# Patient Record
Sex: FEMALE | Race: WHITE | NOT HISPANIC OR LATINO | URBAN - METROPOLITAN AREA
[De-identification: names, ages, dates, MRNs, and addresses within clinical notes are randomized per-mention and may not be internally consistent; named-entity substitution may affect disease eponyms.]

---
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
## 2024-01-29 ENCOUNTER — APPOINTMENT (OUTPATIENT)
Dept: NEUROLOGY | Facility: HOSPITAL | Age: 19
End: 2024-01-29
Payer: COMMERCIAL

## 2024-01-30 ENCOUNTER — DOCUMENTATION (OUTPATIENT)
Dept: HEMATOLOGY/ONCOLOGY | Facility: HOSPITAL | Age: 19
End: 2024-01-30
Payer: COMMERCIAL

## 2024-01-30 NOTE — PROGRESS NOTES
Email of no show  to  navigator. Patient was a no show 1/30/2024 with Aquilino MCGEE.Katerina Guerrero RN

## 2024-01-31 ENCOUNTER — OFFICE VISIT (OUTPATIENT)
Dept: NEUROLOGY | Facility: HOSPITAL | Age: 19
End: 2024-01-31
Payer: COMMERCIAL

## 2024-01-31 VITALS
TEMPERATURE: 97.5 F | SYSTOLIC BLOOD PRESSURE: 120 MMHG | HEART RATE: 101 BPM | RESPIRATION RATE: 18 BRPM | HEIGHT: 66 IN | DIASTOLIC BLOOD PRESSURE: 74 MMHG | WEIGHT: 229 LBS | BODY MASS INDEX: 36.8 KG/M2

## 2024-01-31 DIAGNOSIS — G89.4 CHRONIC PAIN SYNDROME: ICD-10-CM

## 2024-01-31 DIAGNOSIS — G40.109 FOCAL EPILEPSY (MULTI): ICD-10-CM

## 2024-01-31 DIAGNOSIS — R56.9 SEIZURE-LIKE ACTIVITY (MULTI): ICD-10-CM

## 2024-01-31 DIAGNOSIS — G47.19 EXCESSIVE DAYTIME SLEEPINESS: Primary | ICD-10-CM

## 2024-01-31 DIAGNOSIS — R56.9 SEIZURE (MULTI): ICD-10-CM

## 2024-01-31 PROCEDURE — 99214 OFFICE O/P EST MOD 30 MIN: CPT | Performed by: STUDENT IN AN ORGANIZED HEALTH CARE EDUCATION/TRAINING PROGRAM

## 2024-01-31 PROCEDURE — 1036F TOBACCO NON-USER: CPT | Performed by: STUDENT IN AN ORGANIZED HEALTH CARE EDUCATION/TRAINING PROGRAM

## 2024-01-31 RX ORDER — GABAPENTIN 300 MG/1
300 CAPSULE ORAL NIGHTLY
Qty: 30 CAPSULE | Refills: 4 | Status: SHIPPED | OUTPATIENT
Start: 2024-01-31

## 2024-01-31 RX ORDER — LAMOTRIGINE 100 MG/1
100 TABLET ORAL DAILY
Qty: 30 TABLET | Refills: 11 | Status: SHIPPED | OUTPATIENT
Start: 2024-01-31 | End: 2025-01-30

## 2024-01-31 ASSESSMENT — PAIN SCALES - GENERAL: PAINLEVEL: 6

## 2024-01-31 NOTE — PATIENT INSTRUCTIONS
Spenser,    Thank you for letting me take part in your care! You were seen in the neurology resident clinic today for sleepiness and seizures. Ultimately, we planned to get sleep studies and a routine EEG. Please call [phone] to schedule your EEG. Please ask the sleep clinic how long before the sleep tests you need to stop any REM-suppressing medications like bupropion.    Return to clinic in 3-4 months.    To make an appointment in my clinic or leave a message for me with the neurology office, please call [phone]. If you need to contact me directly, please call the hospital  at [phone], and ask them to page me at [phone].    Thank you,  Maurice Aleman MD PGY-3  [facility] [place]

## 2024-01-31 NOTE — PROGRESS NOTES
"NEUROLOGY OUTPATIENT CONSULTATION NOTE  Subjective   Consult Concern: paroxysmal events  History of Presenting Illness  Lisa Ramirez \"Spenser\" is a 18 y.o. right handed gender-queer patient (preferred pronouns \"he/they;\" assigned female sex at birth) with a history notable for asthma, fibromyalgia, depression, and seizures who presents to the neurology clinic for evaluation of daytime sleepiness and to establish care for seizure management. Referred by their internist Dr. Omayra Buitrago.    Sleepiness has been longstanding, which has been called \"depression\"  for a while. In sophomore year of high school, this began evolving to falling asleep in class. Has been progressively worsening since that time. Will suddenly fall asleep at least once daily. Sleep attacks happen in class, amongst friends, or anything. 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    Patient normally falls asleep anytime between midnight and 0300. Will always awaken at 0700. This is the same sleep schedule as the patient had since high school. No consistent napping. Does not have any difficulty falling asleep; occurs very quickly. Up so late because of school work, hobbies, and clubs. There is no day during which the patient does not feel sleepy. Sleep attacks have never caused serious injury. Attacks feel like dreaming starts immediately on falling asleep. Denies sleep paralysis or drop attacks. No onsets of weakness, emotionally driven or otherwise. Father also falls asleep very rapidly.      Often uses a tablet or phone right before bed, often in bed. Has a light on at night. To stay awake, patient typically has 2 cups of coffee daily. No energy drinks or caffeine pills. No other stimulants.    Unsure if any snoring. No mid-sleep arousals. No difficulty with breathing. Has never had a sleep study.    Patient has a chart diagnosis of “pseudoseizure,” but it " is unclear if this was ever demonstrated or disproven by neurophysiology and/or semiologics. Patient got majority of care in [place] prior to coming to [facility] for university studies.    Of note, patient was seen by neurology during admission to [facility] 9/28 - 10/7/23 as a consult for dyspnea to evaluate for neuromuscular origin of respiratory failure. No mention of seizures. Exams by Chad Kennedy and Alessandro Hollingsworth unremarkable. Case was signed-off by Dr. Mcbride and me the following day.    Patient states the paroxysmal events are well-controlled. Tends to start with an unpleasant smell and then progresses to BUE stiffness then to shaking.  Has had multiple EEGs and stays in EMUs, unsure what any of them have shown. Patient does not have any sort of diagnosis that they can recall. Used to get care from a neurologist with [facility]; does not have records. Does have a video of one of the events which they will send via [employer].      ROS:  A comprehensive review-of-systems was obtained and negative unless noted above in the HPI.    Past Medical History  Past Medical History:   Diagnosis Date    Asthma     Exercise induced bronchospasm     Asthma, exercise induced    Personal history of diseases of the blood and blood-forming organs and certain disorders involving the immune mechanism     History of iron deficiency anemia    Personal history of other mental and behavioral disorders     History of depression    Personal history of other mental and behavioral disorders     History of OCD (obsessive compulsive disorder)    Personal history of other specified conditions     History of pseudoseizure     Surgical History  No past surgical history on file.  Social History  Social History     Vaping Use    Vaping Use: Never used   Substance Use Topics    Alcohol use: Never    Drug use: Never     Allergies  Patient has no known allergies.      =========  Medications    Current Outpatient Medications:     acetaminophen (Tylenol) 325 mg  "tablet, Take 2 tablets (650 mg) by mouth every 4 hours as needed for fever, Disp: 30 tablet, Rfl: 0    albuterol 2.5 mg/0.5 mL nebulizer solution, USE ONE DOSE EVERY 4 HOURS AS NEEDED FOR SHORTNESS OF BREATH, Disp: 180 mL, Rfl: 1    albuterol 90 mcg/actuation inhaler, Inhale 2 puffs every 4 hours if needed., Disp: 8.5 g, Rfl: 3    buPROPion XL (Wellbutrin XL) 300 mg 24 hr tablet, TAKE 1 TABLET (300 MG) BY MOUTH ONCE DAILY IN THE MORNING., Disp: 90 tablet, Rfl: 3    cyclobenzaprine (Flexeril) 5 mg tablet, Take 1 tablet (5 mg) by mouth 3 times a day., Disp: 90 tablet, Rfl: 3    fluticasone propion-salmeteroL (Advair Diskus) 250-50 mcg/dose diskus inhaler, Inhale 1 puff 2 times a day. Rinse mouth with water after use to reduce aftertaste and incidence of candidiasis. Do not swallow., Disp: 60 each, Rfl: 11    gabapentin (Neurontin) 300 mg capsule, Take 1 capsule (300 mg) by mouth 2 times a day., Disp: 60 capsule, Rfl: 0    lamoTRIgine (LaMICtal) 100 mg tablet, Take 1 tablet (100 mg) by mouth once daily., Disp: 30 tablet, Rfl: 11    lamoTRIgine (LaMICtal) 100 mg tablet, Take 1 tablet (100 mg) by mouth once daily., Disp: 30 tablet, Rfl: 0    meloxicam (Mobic) 15 mg tablet, Take 1 tablet by mouth once daily., Disp: 30 tablet, Rfl: 0    naproxen (Naprosyn) 500 mg tablet, Take 1 tablet (500 mg) by mouth 2 times a day as needed for 5 days., Disp: 10 tablet, Rfl: 0    norethindrone (Winter) 0.35 mg tablet, Take 1 tablet (0.35 mg) by mouth once daily., Disp: 28 tablet, Rfl: 12    pantoprazole (ProtoNix) 40 mg EC tablet, Take 1 tablet (40 mg) by mouth once daily in the morning., Disp: 30 tablet, Rfl: 3     =========    Objective   Vital Signs  /74   Pulse 101   Temp 36.4 °C (97.5 °F)   Resp 18   Ht 1.676 m (5' 6\")   Wt 104 kg (229 lb)   BMI 36.96 kg/m²     Physical Exam  GENERAL: sitting up in chair comfortably; well-appearing and in NAD. Appears stated age.  PSYCHIATRIC/MSE: conversational; full euthymic affect; " logical thought process; cognition intact.  NEUROLOGICAL:     Cognitive Status: A&Ox4. Language expression, naming, and repetition intact. Remains focused during interview.     Cranial Nerves: VF full; PERRL (4 --> 2) b/l; EOMI; face sensation & strength symmetric; tongue midline; shoulders strong.     Fundoscopic: no papilledema. Normal disc margins. CDR <0.5.     Motor: Normal bulk and tone without tremor or pronator drift. No spasticity.     Strength: All extremities are 5/5.     Reflexes: 2+ to all modalities. Torres's absent. Flexor plantar bilaterally. No ankle clonus.     Sensory: intact and symmetric to light touch     Coordination: normokinetic w/o ataxia or action tremor on FtN and HtS.     Rapid Movements: rapid finger & foot tapping w/o dysdiadochokinesia     Gait: stable with normal step and stride length. Gait on tandem, toe, and heel are steady.      Recent/Relevant Labs:  Hemoglobin   Date/Time Value Ref Range Status   10/06/2023 06:00 AM 12.5 12.0 - 16.0 g/dL Final     WBC   Date/Time Value Ref Range Status   10/06/2023 06:00 AM 11.1 4.4 - 11.3 x10*3/uL Final     Sodium   Date/Time Value Ref Range Status   10/06/2023 06:00  136 - 145 mmol/L Final     Potassium   Date/Time Value Ref Range Status   10/06/2023 06:00 AM 3.8 3.5 - 5.3 mmol/L Final     Chloride   Date/Time Value Ref Range Status   10/06/2023 06:00  98 - 107 mmol/L Final     Urea Nitrogen   Date/Time Value Ref Range Status   10/06/2023 06:00 AM 18 6 - 23 mg/dL Final     Creatinine   Date/Time Value Ref Range Status   10/06/2023 06:00 AM 0.84 0.50 - 1.05 mg/dL Final     Phosphorus   Date/Time Value Ref Range Status   10/06/2023 06:00 AM 4.1 2.5 - 4.9 mg/dL Final     Comment:     The performance characteristics of phosphorus testing in heparinized plasma have been validated by the individual  laboratory site where testing is performed. Testing on heparinized plasma is not approved by the FDA; however, such approval is not  necessary.     Calcium   Date/Time Value Ref Range Status   10/06/2023 06:00 AM 9.1 8.6 - 10.6 mg/dL Final     Total Protein   Date/Time Value Ref Range Status   10/02/2023 06:17 AM 6.6 6.4 - 8.2 g/dL Final     Albumin   Date/Time Value Ref Range Status   10/06/2023 06:00 AM 3.9 3.4 - 5.0 g/dL Final     Bilirubin, Total   Date/Time Value Ref Range Status   10/02/2023 06:17 AM 0.3 0.0 - 1.2 mg/dL Final     AST   Date/Time Value Ref Range Status   10/02/2023 06:17 AM 10 9 - 39 U/L Final     ALT   Date/Time Value Ref Range Status   10/02/2023 06:17 AM 13 7 - 45 U/L Final     Comment:     Patients treated with Sulfasalazine may generate falsely decreased results for ALT.     Alkaline Phosphatase   Date/Time Value Ref Range Status   10/02/2023 06:17 AM 55 33 - 110 U/L Final     Total CK   Date/Time Value Ref Range Status   02/06/2023 05:23 PM 79 0 - 215 U/L Final      LDL   Date/Time Value Ref Range Status   09/01/2022 11:14 AM 49 0 - 109 mg/dL Final     Comment:     .                           NEAR      BORD      AGE      DESIRABLE  OPTIMAL    HIGH     HIGH     VERY HIGH     0-19 Y     0 - 109     ---    110-129   >/= 130     ----    20-24 Y     0 - 119     ---    120-159   >/= 160     ----      >24 Y     0 -  99   100-129  130-159   160-189     >/=190  .       Triglycerides   Date/Time Value Ref Range Status   09/01/2022 11:14 AM 67 0 - 149 mg/dL Final     Comment:     .      AGE      DESIRABLE   BORDERLINE HIGH   HIGH     VERY HIGH   0 D-90 D    19 - 174         ----         ----        ----  91 D- 9 Y     0 -  74        75 -  99     >/= 100      ----    10-19 Y     0 -  89        90 - 129     >/= 130      ----    20-24 Y     0 - 114       115 - 149     >/= 150      ----         >24 Y     0 - 149       150 - 199    200- 499    >/= 500  .   Venipuncture immediately after or during the    administration of Metamizole may lead to falsely   low results. Testing should be performed immediately   prior to Metamizole  dosing.       Hemoglobin A1C   Date/Time Value Ref Range Status   02/06/2023 05:23 PM 5.7 (A) % Final     Comment:          Diagnosis of Diabetes-Adults   Non-Diabetic: < or = 5.6%   Increased risk for developing diabetes: 5.7-6.4%   Diagnostic of diabetes: > or = 6.5%  .       Monitoring of Diabetes                Age (y)     Therapeutic Goal (%)   Adults:          >18           <7.0   Pediatrics:    13-18           <7.5                   7-12           <8.0                   0- 6            7.5-8.5   American Diabetes Association. Diabetes Care 33(S1), Jan 2010.     09/01/2022 11:14 AM 5.8 (A) % Final     Comment:          Diagnosis of Diabetes-Adults   Non-Diabetic: < or = 5.6%   Increased risk for developing diabetes: 5.7-6.4%   Diagnostic of diabetes: > or = 6.5%  .       Monitoring of Diabetes                Age (y)     Therapeutic Goal (%)   Adults:          >18           <7.0   Pediatrics:    13-18           <7.5                   7-12           <8.0                   0- 6            7.5-8.5   American Diabetes Association. Diabetes Care 33(S1), Jan 2010.       Estimated Average Glucose   Date/Time Value Ref Range Status   02/06/2023 05:23  MG/DL Final     Troponin I   Date/Time Value Ref Range Status   09/28/2023 01:04 PM <3 0 - 34 ng/L Final     Comment:     .  Less than 99th percentile of normal range cutoff-  Female and children under 18 years old <35 ng/L; Male <54 ng/L: Negative  Repeat testing should be performed if clinically indicated.   .  Female and children under 18 years old  ng/L; Male  ng/L:  Consistent with possible cardiac damage and possible increased clinical   risk. Serial measurements may help to assess extent of myocardial damage.   .  >120 ng/L: Consistent with cardiac damage, increased clinical risk and  myocardial infarction. Serial measurements may help assess extent of   myocardial damage.   .   NOTE: Children less than 1 year old may have higher baseline troponin    levels and results should be interpreted in conjunction with the overall   clinical context.  .  NOTE: Troponin I testing is performed using a different   testing methodology at [facility] than at other   [facility]. Direct result comparisons should only   be made within the same method.       TSH   Date/Time Value Ref Range Status   02/06/2023 05:23 PM 4.48 (H) 0.44 - 3.98 mIU/L Final     Comment:      TSH testing is performed using different testing    methodology at [facility] than at other    [facility]. Direct result comparisons should    only be made within the same method.     09/01/2022 11:14 AM 1.85 0.44 - 3.98 mIU/L Final     Comment:      TSH testing is performed using different testing    methodology at [facility] than at other    [facility]. Direct result comparisons should    only be made within the same method.       Free T4   Date/Time Value Ref Range Status   02/06/2023 05:23 PM 1.34 0.78 - 1.48 ng/dL Final     Comment:      Thyroxine Free testing is performed using different testing    methodology at [facility] than at other    [facility]. Direct result comparisons should    only be made within the same method.         Recent/Relevant Imaging:  No MRI head results found for the past 12 months   No CT head results found for the past 12 months     Other Recent/Relevant Studies:  No EMG results found for the past 12 months   No EEG results found for the past 12 months   Transthoracic Echo (TTE) Complete    Result Date: 10/2/2023   [facility], [address] [place] [address]                Tel [phone] and Fax [phone] TRANSTHORACIC ECHOCARDIOGRAM REPORT  Patient Name:      POLLO Aldana Physician:    [id number] Chris Freeman MD Study Date:        10/2/2023           Ordering Provider:    [id number] KAITLYNN WISEMAN MRN/PID:            [id number]            Fellow: Accession#:        [id number]        Nurse: Date of Birth/Age: 2005 / 18 years Sonographer:          BERNARD Romo RDCS Gender:            F                   Additional Staff: Height:            167.64              Admit Date:           9/28/2023 Weight:            101.61              Admission Status:     Inpatient - Routine BSA:               2.10 m2             Encounter#:           [id number]                                        Department Location:  [facility] Non                                                              Invasive Blood Pressure: 131 /85 mmHg Study Type:    TRANSTHORACIC ECHO (TTE) COMPLETE Diagnosis/ICD: Chest pain, unspecified-R07.9 Indication:    Chest Pain Patient History: Pertinent History: Chest Pain and Dyspnea. Study Detail: The following Echo studies were performed: 2D, M-Mode, Doppler and               color flow.  PHYSICIAN INTERPRETATION: Left Ventricle: The left ventricular systolic function is normal. There are no regional wall motion abnormalities. The left ventricular cavity size is normal. Spectral Doppler shows an impaired relaxation pattern of left ventricular diastolic filling. Left Atrium: The left atrium is normal in size. Right Ventricle: The right ventricle is normal in size. There is normal right ventricular global systolic function. Right Atrium: The right atrium is normal in size. Aortic Valve: The aortic valve is probably trileaflet. There are increased aortic valve velocities due to increased flow/dynamic ejection. There is no evidence of aortic valve regurgitation. The peak instantaneous gradient of the aortic valve is 9.9 mmHg. Mitral Valve: The mitral valve is normal in structure. There is no evidence of mitral valve regurgitation. Tricuspid Valve: The tricuspid valve is structurally normal. There is trace tricuspid regurgitation. The right  ventricular systolic pressure is unable to be estimated. Pulmonic Valve: The pulmonic valve is structurally normal. There is trace pulmonic valve regurgitation. Pericardium: There is a trivial to small pericardial effusion. Aorta: The aortic root is normal. Systemic Veins: The inferior vena cava appears to be of normal size. In comparison to the previous echocardiogram(s): There are no prior studies on this patient for comparison purposes.  CONCLUSIONS:  1. Left ventricular systolic function is normal.  2. Spectral Doppler shows an impaired relaxation pattern of left ventricular diastolic filling. QUANTITATIVE DATA SUMMARY: 2D MEASUREMENTS:                          Normal Ranges: Ao Root d:     2.70 cm   (2.0-3.7cm) LAs:           3.60 cm   (2.7-4.0cm) IVSd:          0.90 cm   (0.6-1.1cm) LVPWd:         1.10 cm   (0.6-1.1cm) LVIDd:         4.40 cm   (3.9-5.9cm) LVIDs:         3.00 cm LV Mass Index: 70.4 g/m2 LV % FS        31.8 % LA VOLUME:                               Normal Ranges: LA Vol A4C:        47.9 ml    (22+/-6mL/m2) LA Vol A2C:        38.2 ml LA Vol BP:         43.7 ml LA Vol Index A4C:  22.8ml/m2 LA Vol Index A2C:  18.2 ml/m2 LA Vol Index BP:   20.8 ml/m2 LA Area A4C:       16.2 cm2 LA Area A2C:       14.8 cm2 LA Major Axis A4C: 4.7 cm LA Major Axis A2C: 4.9 cm LA Volume Index:   20.8 ml/m2 RA VOLUME BY A/L METHOD:                      Normal Ranges: RA Area A4C: 9.6 cm2 AORTA MEASUREMENTS:                    Normal Ranges: Asc Ao, d: 2.80 cm (2.1-3.4cm) LV SYSTOLIC FUNCTION BY 2D PLANIMETRY (MOD):                     Normal Ranges: EF-A4C View: 65.5 % (>=55%) EF-A2C View: 62.8 % EF-Biplane:  63.0 % LV DIASTOLIC FUNCTION:                        Normal Ranges: MV Peak E:    0.86 m/s (0.7-1.2 m/s) MV Peak A:    1.02 m/s (0.42-0.7 m/s) E/A Ratio:    0.84     (1.0-2.2) MV e'         0.08 m/s (>8.0) MV lateral e' 0.09 m/s MV medial e'  0.06 m/s E/e' Ratio:   11.48    (<8.0) AORTIC VALVE:                       "   Normal Ranges: AoV Vmax:      1.57 m/s (<=1.7m/s) AoV Peak P.9 mmHg (<20mmHg) LVOT Max Tim:  1.36 m/s (<=1.1m/s) LVOT VTI:      21.50 cm LVOT Diameter: 2.00 cm  (1.8-2.4cm) AoV Area,Vmax: 2.72 cm2 (2.5-4.5cm2)  RIGHT VENTRICLE: RV Basal 3.34 cm RV Mid   2.34 cm RV Major 6.7 cm TAPSE:   19.1 mm RV s'    0.14 m/s TRICUSPID VALVE/RVSP:                   Normal Ranges: IVC Diam: 1.52 cm PULMONIC VALVE:                      Normal Ranges: PV Max Tim: 1.0 m/s  (0.6-0.9m/s) PV Max PG:  3.7 mmHg  [id number] Chris Freeman MD Electronically signed on 10/2/2023 at 1:20:56 PM  ** Final **    No results found for this or any previous visit (from the past 4464 hour(s)).         =========  Assessment/Plan   Assessment:  Lisa Ramirez \"Spenser\" is a 18 y.o. right handed gender-queer patient (preferred pronouns \"he/they;\" assigned female sex at birth) with a history notable for asthma, fibromyalgia, depression, and seizures who presents to the neurology clinic for evaluation of daytime sleepiness and to establish care for seizure management. 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 Regarding sleepiness, this has been longstanding since starting college-level classes in high school and gradually progressed. Now manifests as persistent daytime sleepiness with at least daily sleep attacks. Often has dreams initially with the LOC, even if the patient is awoken in several minutes by friends. Has not caused any injury. No cataplexy, sleep paralysis, snoring, or nighttime arousals. Fully endorses poor sleep hygiene with around 4 hours nightly, electronic device use, and light on during sleep. Overall manifestation is suspicious for Insufficient Sleep Syndrome with REM Intrusions; DDx does of course include narcolepsy. Regarding patient history of seizures, the limited anamnesis is suspicious for mesial temporal origin. Although patient is on very minimal AED doses typically considered subtherapeutic for seizures, because these have been very " well-controlled, we will continue those for now. Can get baseline EEG in the meantime.    4D Classification of Paroxysmal Spells  Type: PE  Semiology: olfactory aura event->Dyscognitive->Generalized motor event  EZ: N/A  Etiology: unknown  Comorbidities: depression, fibromyalgia  Add'l Features:     - Lateralizing Signs: NA     - Diagnostic Signs: NA     - Event Triggers: NA  Onset/Frequency: 13yo / 1-2 yearly  AEDs: Current - LTG (mood-dose), (GBP)  Prior- UNK    History of Generalized Convulsive Status Epilepticus?: No  History of Cranial Trauma/Neurosurgery?: No      Impression:   #Insufficient Sleep Syndrome w/ REM Intrusions vs Narcolepsy  #Paroxysmal Events with Concern for Mesial Temporal Epilepsy  #Chronic Pain    Plan:  - employ sleep hygiene modifications as advised in clinic.  - obtain in-house diagnostic PSG and MSLT. Will need to pause bupropion prior.  - obtain baseline routine EEG  - continue LTG 100mg daily and GBP 300mg daily. Neurology will assume these prescribing duties at this time. In the state of [place], there is no need for any urine drug testing to give these medications.  - patient to send video of her paroxysmal events via [employer], as she had one while in the testing center of [facility] that was captured on video.  - will need to get thorough seizure history at the patient's next visit. Ideally would get anamnesis per collateral, but patient states not on great speaking terms with parents.        Maurice Aleman MD PGY-3  [facility] Neurological [place]    ATTENDING PHYSICIAN ADDENDUM:  I saw and evaluated the patient. I personally obtained the key and critical portions of the history and physical exam. I reviewed the resident's documentation and discussed the patient with the resident. I agree with the resident's medical decision making as documented in the note except/in addition:  I edited the resident's note as required.    Luis E Padilla MD  [facility]    The total appointment time today  was 45 minutes. Time included preparing to see the patient, obtaining the history, performing a medically necessary appropriate physical examination, counseling and educating the patient/family/caregiver, ordering medications, tests and procedures, referring and communicating with other providers, independently interpreting results and communicating the results to the patient/family/caregiver, care coordination] and documenting clinical information in the medical record.

## 2024-02-07 ENCOUNTER — HOSPITAL ENCOUNTER (EMERGENCY)
Facility: HOSPITAL | Age: 19
Discharge: HOME | End: 2024-02-08
Attending: EMERGENCY MEDICINE
Payer: COMMERCIAL

## 2024-02-07 VITALS
SYSTOLIC BLOOD PRESSURE: 128 MMHG | RESPIRATION RATE: 18 BRPM | OXYGEN SATURATION: 98 % | HEART RATE: 90 BPM | BODY MASS INDEX: 36.16 KG/M2 | HEIGHT: 66 IN | TEMPERATURE: 98.5 F | DIASTOLIC BLOOD PRESSURE: 81 MMHG | WEIGHT: 225 LBS

## 2024-02-07 DIAGNOSIS — G47.411 NARCOLEPSY AND CATAPLEXY (HHS-HCC): Primary | ICD-10-CM

## 2024-02-07 LAB
ALBUMIN SERPL BCP-MCNC: 4.4 G/DL (ref 3.4–5)
ALP SERPL-CCNC: 59 U/L (ref 33–120)
ALT SERPL W P-5'-P-CCNC: 10 U/L (ref 7–52)
ANION GAP SERPL CALC-SCNC: 14 MMOL/L (ref 10–20)
AST SERPL W P-5'-P-CCNC: 10 U/L (ref 9–39)
BASOPHILS # BLD AUTO: 0.04 X10*3/UL (ref 0–0.1)
BASOPHILS NFR BLD AUTO: 0.4 %
BILIRUB SERPL-MCNC: 0.5 MG/DL (ref 0–1.2)
BUN SERPL-MCNC: 8 MG/DL (ref 6–23)
CALCIUM SERPL-MCNC: 9.5 MG/DL (ref 8.6–10.6)
CHLORIDE SERPL-SCNC: 104 MMOL/L (ref 98–107)
CO2 SERPL-SCNC: 25 MMOL/L (ref 21–32)
CREAT SERPL-MCNC: 0.66 MG/DL (ref 0.5–1.3)
EGFRCR SERPLBLD CKD-EPI 2021: >90 ML/MIN/1.73M*2
EOSINOPHIL # BLD AUTO: 0.08 X10*3/UL (ref 0–0.7)
EOSINOPHIL NFR BLD AUTO: 0.8 %
ERYTHROCYTE [DISTWIDTH] IN BLOOD BY AUTOMATED COUNT: 13.1 % (ref 11.5–14.5)
GLUCOSE SERPL-MCNC: 88 MG/DL (ref 74–99)
HCT VFR BLD AUTO: 37.9 % (ref 36–52)
HGB BLD-MCNC: 12.7 G/DL (ref 12–17.5)
IMM GRANULOCYTES # BLD AUTO: 0.03 X10*3/UL (ref 0–0.7)
IMM GRANULOCYTES NFR BLD AUTO: 0.3 % (ref 0–0.9)
LYMPHOCYTES # BLD AUTO: 3.01 X10*3/UL (ref 1.2–4.8)
LYMPHOCYTES NFR BLD AUTO: 28.3 %
MCH RBC QN AUTO: 24.6 PG (ref 26–34)
MCHC RBC AUTO-ENTMCNC: 33.5 G/DL (ref 32–36)
MCV RBC AUTO: 73 FL (ref 80–100)
MONOCYTES # BLD AUTO: 0.74 X10*3/UL (ref 0.1–1)
MONOCYTES NFR BLD AUTO: 7 %
NEUTROPHILS # BLD AUTO: 6.72 X10*3/UL (ref 1.2–7.7)
NEUTROPHILS NFR BLD AUTO: 63.2 %
NRBC BLD-RTO: 0 /100 WBCS (ref 0–0)
PLATELET # BLD AUTO: 260 X10*3/UL (ref 150–450)
POTASSIUM SERPL-SCNC: 3.6 MMOL/L (ref 3.5–5.3)
PROT SERPL-MCNC: 6.7 G/DL (ref 6.4–8.2)
RBC # BLD AUTO: 5.17 X10*6/UL (ref 4–5.9)
SODIUM SERPL-SCNC: 139 MMOL/L (ref 136–145)
WBC # BLD AUTO: 10.6 X10*3/UL (ref 4.4–11.3)

## 2024-02-07 PROCEDURE — 85025 COMPLETE CBC W/AUTO DIFF WBC: CPT | Performed by: STUDENT IN AN ORGANIZED HEALTH CARE EDUCATION/TRAINING PROGRAM

## 2024-02-07 PROCEDURE — 99283 EMERGENCY DEPT VISIT LOW MDM: CPT | Performed by: EMERGENCY MEDICINE

## 2024-02-07 PROCEDURE — 80053 COMPREHEN METABOLIC PANEL: CPT | Performed by: STUDENT IN AN ORGANIZED HEALTH CARE EDUCATION/TRAINING PROGRAM

## 2024-02-07 PROCEDURE — 36415 COLL VENOUS BLD VENIPUNCTURE: CPT | Performed by: STUDENT IN AN ORGANIZED HEALTH CARE EDUCATION/TRAINING PROGRAM

## 2024-02-07 ASSESSMENT — COLUMBIA-SUICIDE SEVERITY RATING SCALE - C-SSRS
2. HAVE YOU ACTUALLY HAD ANY THOUGHTS OF KILLING YOURSELF?: NO
1. IN THE PAST MONTH, HAVE YOU WISHED YOU WERE DEAD OR WISHED YOU COULD GO TO SLEEP AND NOT WAKE UP?: NO
6. HAVE YOU EVER DONE ANYTHING, STARTED TO DO ANYTHING, OR PREPARED TO DO ANYTHING TO END YOUR LIFE?: NO

## 2024-02-07 ASSESSMENT — PAIN SCALES - GENERAL
PAINLEVEL_OUTOF10: 0 - NO PAIN
PAINLEVEL_OUTOF10: 0 - NO PAIN

## 2024-02-07 ASSESSMENT — LIFESTYLE VARIABLES
EVER FELT BAD OR GUILTY ABOUT YOUR DRINKING: NO
EVER HAD A DRINK FIRST THING IN THE MORNING TO STEADY YOUR NERVES TO GET RID OF A HANGOVER: NO
HAVE PEOPLE ANNOYED YOU BY CRITICIZING YOUR DRINKING: NO
HAVE YOU EVER FELT YOU SHOULD CUT DOWN ON YOUR DRINKING: NO

## 2024-02-07 ASSESSMENT — PAIN - FUNCTIONAL ASSESSMENT: PAIN_FUNCTIONAL_ASSESSMENT: 0-10

## 2024-02-07 NOTE — Clinical Note
"                        Lisa Ramirez was seen and treated in our emergency department on 2/7/2024.  Lisa Ramirez \"Spenser\" may return to school on 02/12/2024.    To whom it may concern, Spenser suffers from narcolepsy and was unable to take medications over the last week which would have severely affected their performance in school or ability to stay awake during classes or participate in exams.  Please take this into consideration.  Excused from school until Monday 2/12.    If you have any questions or concerns, please don't hesitate to call.      Wilber Holt MD"

## 2024-02-07 NOTE — ED TRIAGE NOTES
"Patient was seeing a neurologist for possible narcolepsy and they recently took her them off of a medication for this and they have been falling asleep \"everywhere\" since then; states they were walking to class recently and next thing they knew they were on the ground asleep; also endorses an increase in generalized fatigue   " 

## 2024-02-08 DIAGNOSIS — G47.9: Primary | ICD-10-CM

## 2024-02-08 RX ORDER — BUPROPION HYDROCHLORIDE 150 MG/1
TABLET ORAL
Qty: 7 TABLET | Refills: 0 | Status: SHIPPED | OUTPATIENT
Start: 2024-02-08 | End: 2024-04-30 | Stop reason: WASHOUT

## 2024-02-08 NOTE — PROGRESS NOTES
Contacted patient today regarding MSLT protocol for Wellbutrin XL. Two weeks prior to appointment, patient will decrease dose from 300mg QAM to 150mg QAM. Then, one week prior to appointment, patient will stop Wellbutrin. Will need to monitor closely for any depressive effects; if major depressive symptoms arise, we will stop the taper and return to 300mg QAM.    Sending a 7-day Rx of Wellbutrin XL 150mg to the patient's preferred pharmacy. Communicated these instructions to the patient.    Case discussed with Dr. Luis E Aleman MD PGY-3  [facility] [place]

## 2024-02-08 NOTE — DISCHARGE INSTRUCTIONS
Resume taking Wellbutrin.  Follow-up with your doctor as outpatient.  Please speak with her professors about what has been going on, so that they can provide specific accommodations.

## 2024-02-08 NOTE — ED PROVIDER NOTES
"CC: Narcolepsy     HPI:  18-year-old patient (preferred pronouns he/they, assigned female at birth) presents to the emergency department with concern for worsening symptoms of narcolepsy.  Patient states has been following with neurology as outpatient, also with a history of epilepsy.  Saw his outpatient neurologist last week, was advised to hold Wellbutrin and had a sleep study which has not been scheduled yet.  Since discontinuing his Wellbutrin, he has had significant worsening symptoms of daytime sleepiness, falling asleep during an exam multiple times yesterday.  Also with an episode of his legs giving out from under him, which he says he has had 1 time previously.  Did not hit his head and suffered no injuries from that fall.  He is unsure if he should resume his Wellbutrin presents to the ED for further evaluation.    Records Reviewed:  Recent available ED and inpatient notes reviewed in EMR.    PMHx/PSHx:  Per HPI.   - has a past medical history of Asthma, Exercise induced bronchospasm, Personal history of diseases of the blood and blood-forming organs and certain disorders involving the immune mechanism, Personal history of other mental and behavioral disorders, Personal history of other mental and behavioral disorders, and Personal history of other specified conditions.  - has no past surgical history on file.  - has Asthma, acute; Stridor; Anemia; Anxiety; and Depression on their problem list.    Medications:  Reviewed in EMR. See EMR for complete list of medications and doses.    Allergies:  Patient has no known allergies.    Social History:  - Tobacco:  reports that Lisa Ramirez \"Spenser\" has never smoked. Lisa Ramirez \"Spenser\" has never used smokeless tobacco.   - Alcohol:  reports no history of alcohol use.   - Illicit Drugs:  reports no history of drug use.     ROS:  Per HPI.       ???????????????????????????????????????????????????????????????  Triage Vitals:  T 36.9 °C (98.5 °F)  HR 90  BP " "128/81  RR 18  O2 98 %      Physical Exam  Vitals and nursing note reviewed.   Constitutional:       General: Lisa Ramirez \"Spenser\" is not in acute distress.  HENT:      Head: Atraumatic.   Eyes:      General: No scleral icterus.     Conjunctiva/sclera: Conjunctivae normal.   Cardiovascular:      Rate and Rhythm: Regular rhythm.      Heart sounds: No murmur heard.     No friction rub. No gallop.   Pulmonary:      Effort: No respiratory distress.      Breath sounds: Normal breath sounds. No wheezing or rales.   Chest:      Chest wall: No tenderness.   Abdominal:      Palpations: Abdomen is soft.      Tenderness: There is no abdominal tenderness.   Musculoskeletal:      Right lower leg: No edema.      Left lower leg: No edema.   Skin:     General: Skin is warm and dry.      Findings: No rash.   Neurological:      Mental Status: Lisa Ramirez \"Spenser\" is alert.      Cranial Nerves: No facial asymmetry.   Psychiatric:         Mood and Affect: Mood normal.         Behavior: Behavior normal.       ???????????????????????????????????????????????????????????????  Assessment and Plan:  18-year-old presents to the emergency department with concern for excess daytime sleepiness and worsening symptoms of narcolepsy.  This symptoms appear to correlate with his discontinuation of Wellbutrin.  I did speak with the neurology residents on call, they recommend resuming this medication at this time, as he has no set date for his sleep study, there is no use and holding the medication this far out had.  They will discuss with his outpatient doctor in the morning as well to arrange for follow-up.  Basic labs reviewed were unremarkable.  Discharged home in stable condition.    Social Determinants Limiting Care:  None identified    Disposition:  Discharge home    --  Wilber Holt MD  Emergency Medicine, PGY-3      Procedures ? [employer] last updated 2/7/2024 10:34 PM        Wilber Holt MD  Resident  02/07/24 [id number]    "

## 2024-02-28 ENCOUNTER — OFFICE VISIT (OUTPATIENT)
Dept: NEUROLOGY | Facility: HOSPITAL | Age: 19
End: 2024-02-28
Payer: COMMERCIAL

## 2024-02-28 DIAGNOSIS — G47.419 SLEEP ATTACK (HHS-HCC): Primary | ICD-10-CM

## 2024-02-28 PROCEDURE — 1036F TOBACCO NON-USER: CPT | Performed by: PSYCHIATRY & NEUROLOGY

## 2024-02-28 PROCEDURE — 99215 OFFICE O/P EST HI 40 MIN: CPT | Performed by: PSYCHIATRY & NEUROLOGY

## 2024-02-28 PROCEDURE — 99215 OFFICE O/P EST HI 40 MIN: CPT | Mod: GC | Performed by: PSYCHIATRY & NEUROLOGY

## 2024-02-28 NOTE — PROGRESS NOTES
"NEUROLOGY OUTPATIENT FOLLOW-UP NOTE  Subjective   Patient Overview  Lisa Ramirez \"Spenser\" is a 18 y.o. right handed gender-queer patient (preferred pronouns \"he/they;\" assigned female sex at birth) with a history notable for asthma, fibromyalgia, depression, and paroxysmal events who presents to the neurology clinic for followup of excessive daytime sleepiness and paroxysmal events. The patient has carried the diagnosis of Insufficient Sleep Syndrome and Paroxysmal Events.    Previous Neurologic History  Briefly, the patient was first seen in neurology clinic on 1/31/24. At that time, they endorsed both sleepiness (main reason for consult) and paroxysmal events. Regarding sleepiness, this was described as longstanding since starting college-level classes in high school and gradually progressed. Now manifesting as persistent daytime sleepiness with at least once-daily sleep attacks. Often has dreams initially with the LOC, even if they are awoken in several minutes by friends. No other stigmata of narcolepsy and endorsed poor sleep hygiene. Regarding paroxysmal events, patient endorsed episodes of foul smell followed by LOC and diffuse motor actions since childhood.    Overall diagnoses were consistent with Insufficient Sleep Syndrome with REM Intrusions (DDx narcolepsy) and unspecified paroxysmal events. Plan was to obtain PSG + MSLT + EEG, continue home  &  daily, and get better history of events at next appt. Patient also planned to send video of paroxysmal events.      Interval History  In the interim, the patient states that sleep attacks have persisted. Did have an event (see my patient message from 2/8/23) where they abruptly stopped methylphenidate thinking an MSLT would be soon and subsequently had worsening of the attacks. Clarified the protocol for weaning REM-suppressing medications to the patient.    At this time, the patient is frustrated with how slowly workup is going because it does " "negatively impact their daily function (especially as a premed student). Inquires about an LP, which is possible but given there are no sleep attendings in neurology clinic, orexin would not be a test we would be comfortable obtaining, sending, and interpreting.     Additionally obtained information on the paroxysmal events (NOT to be confused with the sleep intrusions, which are separate). Details below.    HISTORY OF PAROXYSMAL EVENTS:             Age and circumstances of first event: 13yo. At a friend's house.             History of generalized events? yes             History of status epilepticus? no             Frequency: 1-2x yearly. Usually during stressful life events.             Last episode: 2023               Last EEG?: 15 or 17 yo. At [facility]. Has had overnight cvEEG.             Last MRI?: Spring 2023    ANAMNESIS PER PATIENT  A day before, the patient will notice a little \"weird\" feeling. During the event, there will be a burning smell. Then loses consciousness and wakes up on the floor with all muscles hurting. Has had minor injuries. Usually has urinary incontinence and has once or twice awakened with blood in the mouth, but does not have stimata of tongue biting.    Events have never changed in character, frequency, or duration.      ANAMNESIS PER COLLATERAL  Unable to obtain.      RELATED COMORBIDITIES  Other Spell Types             Staring Spells? - yes. Big problem in middle and high school.             Arm/Leg Jerks? - maybe occasionally?              Lost blocks of time? - no             Events during sleep? - none    Epilepsy Risk Factors             Term birth - Yes             History of  hypoxia - no             History of birth complications - no             History of abnormal development or intellectual disability - no             History of CNS infection (meningitis/encephalitis) - no              History of febrile seizures - no             History of " moderate/severe head trauma - several mild concussions             History of stroke/ICH - no             Family history of seizures - no             History of drug / alcohol use - maybe occasional marijuana??             History of neurosurgical procedure - no    ADDITIONAL INFORMATION  Anti-Epileptic Drug (AED) History             Compliant: yes             Current AEDs: LMT 100mg Qday; GBP 300mg QHS             Current medication side effects: none             AEDs previously used and reasons for discontinuation: none    Quality of Life:             Driving: none             Working: Case Student- Premed             Living with: self - studio apartment.      ROS:  A comprehensive review-of-systems was obtained and negative unless noted above in the HPI.    Past Medical History  Past Medical History:   Diagnosis Date    Asthma     Exercise induced bronchospasm     Asthma, exercise induced    Personal history of diseases of the blood and blood-forming organs and certain disorders involving the immune mechanism     History of iron deficiency anemia    Personal history of other mental and behavioral disorders     History of depression    Personal history of other mental and behavioral disorders     History of OCD (obsessive compulsive disorder)    Personal history of other specified conditions     History of pseudoseizure     Surgical History  No past surgical history on file.  Social History  Social History     Tobacco Use    Smoking status: Never    Smokeless tobacco: Never   Vaping Use    Vaping Use: Never used   Substance Use Topics    Alcohol use: Never    Drug use: Never     Allergies  Patient has no known allergies.      =========  Medications    Current Outpatient Medications:     acetaminophen (Tylenol) 325 mg tablet, Take 2 tablets (650 mg) by mouth every 4 hours as needed for fever, Disp: 30 tablet, Rfl: 0    albuterol 2.5 mg/0.5 mL nebulizer solution, USE ONE DOSE EVERY 4 HOURS AS NEEDED FOR SHORTNESS OF  BREATH, Disp: 180 mL, Rfl: 1    albuterol 90 mcg/actuation inhaler, Inhale 2 puffs every 4 hours if needed., Disp: 8.5 g, Rfl: 3    buPROPion XL (Wellbutrin XL) 150 mg 24 hr tablet, Do not crush, chew, or split. Take 14 days prior to appointment and take the last tablet 8 days prior to multiple sleep latency test INSTEAD of the 300mg dose., Disp: 7 tablet, Rfl: 0    buPROPion XL (Wellbutrin XL) 300 mg 24 hr tablet, TAKE 1 TABLET (300 MG) BY MOUTH ONCE DAILY IN THE MORNING., Disp: 90 tablet, Rfl: 3    cyclobenzaprine (Flexeril) 5 mg tablet, Take 1 tablet (5 mg) by mouth 3 times a day., Disp: 90 tablet, Rfl: 3    fluticasone propion-salmeteroL (Advair Diskus) 250-50 mcg/dose diskus inhaler, Inhale 1 puff 2 times a day. Rinse mouth with water after use to reduce aftertaste and incidence of candidiasis. Do not swallow., Disp: 60 each, Rfl: 11    gabapentin (Neurontin) 300 mg capsule, Take 1 capsule (300 mg) by mouth once daily at bedtime., Disp: 30 capsule, Rfl: 4    lamoTRIgine (LaMICtal) 100 mg tablet, Take 1 tablet (100 mg) by mouth once daily., Disp: 30 tablet, Rfl: 11    meloxicam (Mobic) 15 mg tablet, Take 1 tablet by mouth once daily., Disp: 30 tablet, Rfl: 0    naproxen (Naprosyn) 500 mg tablet, Take 1 tablet (500 mg) by mouth 2 times a day as needed for 5 days., Disp: 10 tablet, Rfl: 0    norethindrone (Winter) 0.35 mg tablet, Take 1 tablet (0.35 mg) by mouth once daily., Disp: 28 tablet, Rfl: 12    pantoprazole (ProtoNix) 40 mg EC tablet, Take 1 tablet (40 mg) by mouth once daily in the morning., Disp: 30 tablet, Rfl: 3     =========      Objective   Vital Signs  There were no vitals taken for this visit.    Physical Exam  GENERAL: sitting up in chair comfortably; well-appearing and in NAD. Appears stated age.  PSYCHIATRIC/MSE: conversational; full euthymic affect; logical thought process; cognition intact.  NEUROLOGICAL:     Cognitive Status: A&Ox4. Language expression, naming, and repetition intact. Remains  focused during interview.     Cranial Nerves: VF full; PERRL (4 --> 2) b/l; EOMI; face sensation & strength symmetric; tongue midline; shoulders strong.     Fundoscopic: no papilledema. Normal disc margins. CDR <0.5.     Motor: Normal bulk and tone without tremor or pronator drift. No spasticity.     Strength: All extremities are 5/5.     Reflexes: 2+ to all modalities. Torres's absent. Flexor plantar bilaterally. No ankle clonus.     Sensory: intact and symmetric to light touch     Coordination: normokinetic w/o ataxia or action tremor on FtN and HtS.     Rapid Movements: rapid finger & foot tapping w/o dysdiadochokinesia     Gait: stable with normal step and stride length. Gait on tandem, toe, and heel are steady.         Recent/Relevant Labs:  Hemoglobin   Date/Time Value Ref Range Status   02/07/2024 08:29 PM 12.7 12.0 - 17.5 g/dL Final     WBC   Date/Time Value Ref Range Status   02/07/2024 08:29 PM 10.6 4.4 - 11.3 x10*3/uL Final     Sodium   Date/Time Value Ref Range Status   02/07/2024 08:29  136 - 145 mmol/L Final     Potassium   Date/Time Value Ref Range Status   02/07/2024 08:29 PM 3.6 3.5 - 5.3 mmol/L Final     Chloride   Date/Time Value Ref Range Status   02/07/2024 08:29  98 - 107 mmol/L Final     Urea Nitrogen   Date/Time Value Ref Range Status   02/07/2024 08:29 PM 8 6 - 23 mg/dL Final     Creatinine   Date/Time Value Ref Range Status   02/07/2024 08:29 PM 0.66 0.50 - 1.30 mg/dL Final     Phosphorus   Date/Time Value Ref Range Status   10/06/2023 06:00 AM 4.1 2.5 - 4.9 mg/dL Final     Comment:     The performance characteristics of phosphorus testing in heparinized plasma have been validated by the individual  laboratory site where testing is performed. Testing on heparinized plasma is not approved by the FDA; however, such approval is not necessary.     Calcium   Date/Time Value Ref Range Status   02/07/2024 08:29 PM 9.5 8.6 - 10.6 mg/dL Final     Total Protein   Date/Time Value Ref  Range Status   02/07/2024 08:29 PM 6.7 6.4 - 8.2 g/dL Final     Albumin   Date/Time Value Ref Range Status   02/07/2024 08:29 PM 4.4 3.4 - 5.0 g/dL Final     Bilirubin, Total   Date/Time Value Ref Range Status   02/07/2024 08:29 PM 0.5 0.0 - 1.2 mg/dL Final     AST   Date/Time Value Ref Range Status   02/07/2024 08:29 PM 10 9 - 39 U/L Final     ALT   Date/Time Value Ref Range Status   02/07/2024 08:29 PM 10 7 - 52 U/L Final     Comment:     Patients treated with Sulfasalazine may generate falsely decreased results for ALT.     Alkaline Phosphatase   Date/Time Value Ref Range Status   02/07/2024 08:29 PM 59 33 - 120 U/L Final     Total CK   Date/Time Value Ref Range Status   02/06/2023 05:23 PM 79 0 - 215 U/L Final      LDL   Date/Time Value Ref Range Status   09/01/2022 11:14 AM 49 0 - 109 mg/dL Final     Comment:     .                           NEAR      BORD      AGE      DESIRABLE  OPTIMAL    HIGH     HIGH     VERY HIGH     0-19 Y     0 - 109     ---    110-129   >/= 130     ----    20-24 Y     0 - 119     ---    120-159   >/= 160     ----      >24 Y     0 -  99   100-129  130-159   160-189     >/=190  .       Triglycerides   Date/Time Value Ref Range Status   09/01/2022 11:14 AM 67 0 - 149 mg/dL Final     Comment:     .      AGE      DESIRABLE   BORDERLINE HIGH   HIGH     VERY HIGH   0 D-90 D    19 - 174         ----         ----        ----  91 D- 9 Y     0 -  74        75 -  99     >/= 100      ----    10-19 Y     0 -  89        90 - 129     >/= 130      ----    20-24 Y     0 - 114       115 - 149     >/= 150      ----         >24 Y     0 - 149       150 - 199    200- 499    >/= 500  .   Venipuncture immediately after or during the    administration of Metamizole may lead to falsely   low results. Testing should be performed immediately   prior to Metamizole dosing.       Hemoglobin A1C   Date/Time Value Ref Range Status   02/06/2023 05:23 PM 5.7 (A) % Final     Comment:          Diagnosis of Diabetes-Adults    Non-Diabetic: < or = 5.6%   Increased risk for developing diabetes: 5.7-6.4%   Diagnostic of diabetes: > or = 6.5%  .       Monitoring of Diabetes                Age (y)     Therapeutic Goal (%)   Adults:          >18           <7.0   Pediatrics:    13-18           <7.5                   7-12           <8.0                   0- 6            7.5-8.5   American Diabetes Association. Diabetes Care 33(S1), Jan 2010.     09/01/2022 11:14 AM 5.8 (A) % Final     Comment:          Diagnosis of Diabetes-Adults   Non-Diabetic: < or = 5.6%   Increased risk for developing diabetes: 5.7-6.4%   Diagnostic of diabetes: > or = 6.5%  .       Monitoring of Diabetes                Age (y)     Therapeutic Goal (%)   Adults:          >18           <7.0   Pediatrics:    13-18           <7.5                   7-12           <8.0                   0- 6            7.5-8.5   American Diabetes Association. Diabetes Care 33(S1), Jan 2010.       Estimated Average Glucose   Date/Time Value Ref Range Status   02/06/2023 05:23  MG/DL Final     Troponin I   Date/Time Value Ref Range Status   09/28/2023 01:04 PM <3 0 - 34 ng/L Final     Comment:     .  Less than 99th percentile of normal range cutoff-  Female and children under 18 years old <35 ng/L; Male <54 ng/L: Negative  Repeat testing should be performed if clinically indicated.   .  Female and children under 18 years old  ng/L; Male  ng/L:  Consistent with possible cardiac damage and possible increased clinical   risk. Serial measurements may help to assess extent of myocardial damage.   .  >120 ng/L: Consistent with cardiac damage, increased clinical risk and  myocardial infarction. Serial measurements may help assess extent of   myocardial damage.   .   NOTE: Children less than 1 year old may have higher baseline troponin   levels and results should be interpreted in conjunction with the overall   clinical context.  .  NOTE: Troponin I testing is performed using a different    testing methodology at [facility] than at [facility]. Direct result comparisons should only   be made within the same method.       TSH   Date/Time Value Ref Range Status   02/06/2023 05:23 PM 4.48 (H) 0.44 - 3.98 mIU/L Final     Comment:      TSH testing is performed using different testing    methodology at [facility] than at [facility]. Direct result comparisons should    only be made within the same method.     09/01/2022 11:14 AM 1.85 0.44 - 3.98 mIU/L Final     Comment:      TSH testing is performed using different testing    methodology at [facility] than at [facility]. Direct result comparisons should    only be made within the same method.       Free T4   Date/Time Value Ref Range Status   02/06/2023 05:23 PM 1.34 0.78 - 1.48 ng/dL Final     Comment:      Thyroxine Free testing is performed using different testing    methodology at [facility] than at [facility]. Direct result comparisons should    only be made within the same method.         Recent/Relevant Imaging:  No MRI head results found for the past 12 months   No CT head results found for the past 12 months     Other Recent/Relevant Studies:  No EMG results found for the past 12 months   No EEG results found for the past 12 months   Transthoracic Echo (TTE) Complete    Result Date: 10/2/2023   [facility], [address] [place] [address]                Tel [phone] and Fax [phone] TRANSTHORACIC ECHOCARDIOGRAM REPORT  Patient Name:      POLLO Aldana Physician:    [id number] Chris Freeman MD Study Date:        10/2/2023           Ordering Provider:    [id number] KAITLYNN WISEMAN MRN/PID:           [id number]            Fellow: Accession#:        [id number]        Nurse: Date of Birth/Age: 2005 / 18 years Sonographer:          Anastasia  Husamwa                                                              [facility], [place] Gender:            F                   Additional Staff: Height:            167.64              Admit Date:           9/28/2023 Weight:            101.61              Admission Status:     Inpatient - Routine BSA:               2.10 m2             Encounter#:           [id number]                                        Department Location:  [facility] Non                                                              Invasive Blood Pressure: 131 /85 mmHg Study Type:    TRANSTHORACIC ECHO (TTE) COMPLETE Diagnosis/ICD: Chest pain, unspecified-R07.9 Indication:    Chest Pain Patient History: Pertinent History: Chest Pain and Dyspnea. Study Detail: The following Echo studies were performed: 2D, M-Mode, Doppler and               color flow.  PHYSICIAN INTERPRETATION: Left Ventricle: The left ventricular systolic function is normal. There are no regional wall motion abnormalities. The left ventricular cavity size is normal. Spectral Doppler shows an impaired relaxation pattern of left ventricular diastolic filling. Left Atrium: The left atrium is normal in size. Right Ventricle: The right ventricle is normal in size. There is normal right ventricular global systolic function. Right Atrium: The right atrium is normal in size. Aortic Valve: The aortic valve is probably trileaflet. There are increased aortic valve velocities due to increased flow/dynamic ejection. There is no evidence of aortic valve regurgitation. The peak instantaneous gradient of the aortic valve is 9.9 mmHg. Mitral Valve: The mitral valve is normal in structure. There is no evidence of mitral valve regurgitation. Tricuspid Valve: The tricuspid valve is structurally normal. There is trace tricuspid regurgitation. The right ventricular systolic pressure is unable to be estimated. Pulmonic Valve: The pulmonic valve is structurally normal. There is trace pulmonic valve  regurgitation. Pericardium: There is a trivial to small pericardial effusion. Aorta: The aortic root is normal. Systemic Veins: The inferior vena cava appears to be of normal size. In comparison to the previous echocardiogram(s): There are no prior studies on this patient for comparison purposes.  CONCLUSIONS:  1. Left ventricular systolic function is normal.  2. Spectral Doppler shows an impaired relaxation pattern of left ventricular diastolic filling. QUANTITATIVE DATA SUMMARY: 2D MEASUREMENTS:                          Normal Ranges: Ao Root d:     2.70 cm   (2.0-3.7cm) LAs:           3.60 cm   (2.7-4.0cm) IVSd:          0.90 cm   (0.6-1.1cm) LVPWd:         1.10 cm   (0.6-1.1cm) LVIDd:         4.40 cm   (3.9-5.9cm) LVIDs:         3.00 cm LV Mass Index: 70.4 g/m2 LV % FS        31.8 % LA VOLUME:                               Normal Ranges: LA Vol A4C:        47.9 ml    (22+/-6mL/m2) LA Vol A2C:        38.2 ml LA Vol BP:         43.7 ml LA Vol Index A4C:  22.8ml/m2 LA Vol Index A2C:  18.2 ml/m2 LA Vol Index BP:   20.8 ml/m2 LA Area A4C:       16.2 cm2 LA Area A2C:       14.8 cm2 LA Major Axis A4C: 4.7 cm LA Major Axis A2C: 4.9 cm LA Volume Index:   20.8 ml/m2 RA VOLUME BY A/L METHOD:                      Normal Ranges: RA Area A4C: 9.6 cm2 AORTA MEASUREMENTS:                    Normal Ranges: Asc Ao, d: 2.80 cm (2.1-3.4cm) LV SYSTOLIC FUNCTION BY 2D PLANIMETRY (MOD):                     Normal Ranges: EF-A4C View: 65.5 % (>=55%) EF-A2C View: 62.8 % EF-Biplane:  63.0 % LV DIASTOLIC FUNCTION:                        Normal Ranges: MV Peak E:    0.86 m/s (0.7-1.2 m/s) MV Peak A:    1.02 m/s (0.42-0.7 m/s) E/A Ratio:    0.84     (1.0-2.2) MV e'         0.08 m/s (>8.0) MV lateral e' 0.09 m/s MV medial e'  0.06 m/s E/e' Ratio:   11.48    (<8.0) AORTIC VALVE:                         Normal Ranges: AoV Vmax:      1.57 m/s (<=1.7m/s) AoV Peak P.9 mmHg (<20mmHg) LVOT Max Tim:  1.36 m/s (<=1.1m/s) LVOT VTI:      21.50 cm  "LVOT Diameter: 2.00 cm  (1.8-2.4cm) AoV Area,Vmax: 2.72 cm2 (2.5-4.5cm2)  RIGHT VENTRICLE: RV Basal 3.34 cm RV Mid   2.34 cm RV Major 6.7 cm TAPSE:   19.1 mm RV s'    0.14 m/s TRICUSPID VALVE/RVSP:                   Normal Ranges: IVC Diam: 1.52 cm PULMONIC VALVE:                      Normal Ranges: PV Max Tim: 1.0 m/s  (0.6-0.9m/s) PV Max PG:  3.7 mmHg  [id number] Chris Freeman MD Electronically signed on 10/2/2023 at 1:20:56 PM  ** Final **    No results found for this or any previous visit (from the past 4464 hour(s)).         =========  Assessment/Plan   Assessment:  Lisa Ramirez \"Spenser\" is a 18 y.o. right handed gender-queer patient (preferred pronouns \"he/they;\" assigned female sex at birth) with a history notable for asthma, fibromyalgia, depression, and paroxysmal events who presents to the neurology clinic for followup of excessive daytime sleepiness and paroxysmal events. The patient has carried the diagnosis of Insufficient Sleep Syndrome and Paroxysmal Events. Sleepiness has remained essentially unchanged but is debilitating to the patient's function as a student, causing considerable distress. There has been no time for neurophysiologic or multimodal testing to be scheduled. LP is discussed but, given orexin testing is not something general neurology routinely does, would prefer the risks/benefits be assessed by a fellowship-trained sleep physician. Regarding paroxysmal events, they remain stable on current minimal AED therapy. Will continue workup as below and reach out to sleep med for any recommendations and quick referrals    4D Classification of Paroxysmal Events  Type: Unspecified Paroxysmal Events  Semiology: Olfactory Aura -> Dyscognitive->Generalized Motor Event  EZ: N/A  Etiology: unknown  Comorbidities: depression, fibromyalgia  Add'l Features:     - Lateralizing Signs: NA     - Diagnostic Signs: NA     - Event Triggers: NA  Onset/Frequency: 13yo / 1-2 yearly  AEDs: Current - LTG (mood-dose), " (GBP)  Prior- [facility]     History of Generalized Convulsive Status Epilepticus?: No  History of Cranial Trauma/Neurosurgery?: No    Impression:   #Excessive Daytime Sleepiness  #Insufficient Sleep Syndrome w/ REM Intrusions vs Narcolepsy  #Paroxysmal Events with Moderate Concern for Mesial Temporal Epilepsy  #Chronic Pain    Plan:  - refer to sleep medicine for an evaluation as soon as possible  - employ sleep hygiene modifications as advised in clinic.  - obtain in-house diagnostic PSG then MSLT. Will need to wean bupropion prior to the latter (plan communicated to patient).  - obtain baseline routine EEG  - continue LTG 100mg daily and GBP 300mg daily.  - send-in any videos of paroxysmal events to allow semiotic analysis      Maurice Aleman MD PGY-3  [facility] [place]

## 2024-02-28 NOTE — PATIENT INSTRUCTIONS
Spenser,    Thank you for letting me take part in your care; it was so great to see you! You were seen in the neurology resident clinic today for 1) sleep attacks and 2) paroxysmal motor events. Ultimately, we planned to push up your appointments by getting you in to sleep medicine ASAP. We will reach out personally to their staff and attempt to get you an early appointment.    Return to clinic in 3-6 months.    To make an appointment in my clinic or leave a message for me with the neurology office, please call [phone]. If you need to contact me directly, please call the hospital  at [phone], and ask them to page me at [phone].    Thank you,  Maurice Aleman MD PGY-3  [facility] [place]

## 2024-03-14 ENCOUNTER — TELEPHONE (OUTPATIENT)
Dept: NEUROLOGY | Facility: CLINIC | Age: 19
End: 2024-03-14

## 2024-03-14 DIAGNOSIS — G47.19 EXCESSIVE DAYTIME SLEEPINESS: Primary | ICD-10-CM

## 2024-03-14 NOTE — TELEPHONE ENCOUNTER
----- Message from Braden Mullen MD sent at 3/14/2024  1:37 PM EDT -----  Please let this patient know that we will refer her to sleep medicine and they can decide on the next steps. Referral is in. Thanks.     Braden

## 2024-03-25 ENCOUNTER — APPOINTMENT (OUTPATIENT)
Dept: RADIOLOGY | Facility: HOSPITAL | Age: 19
End: 2024-03-25
Payer: COMMERCIAL

## 2024-03-25 ENCOUNTER — HOSPITAL ENCOUNTER (EMERGENCY)
Facility: HOSPITAL | Age: 19
Discharge: HOME | End: 2024-03-25
Attending: EMERGENCY MEDICINE
Payer: COMMERCIAL

## 2024-03-25 VITALS
BODY MASS INDEX: 35.36 KG/M2 | WEIGHT: 220 LBS | OXYGEN SATURATION: 98 % | RESPIRATION RATE: 18 BRPM | SYSTOLIC BLOOD PRESSURE: 117 MMHG | TEMPERATURE: 97.5 F | HEART RATE: 80 BPM | DIASTOLIC BLOOD PRESSURE: 67 MMHG | HEIGHT: 66 IN

## 2024-03-25 DIAGNOSIS — M79.675 GREAT TOE PAIN, LEFT: Primary | ICD-10-CM

## 2024-03-25 PROCEDURE — 96372 THER/PROPH/DIAG INJ SC/IM: CPT

## 2024-03-25 PROCEDURE — 99285 EMERGENCY DEPT VISIT HI MDM: CPT | Performed by: NURSE PRACTITIONER

## 2024-03-25 PROCEDURE — 2500000004 HC RX 250 GENERAL PHARMACY W/ HCPCS (ALT 636 FOR OP/ED): Performed by: NURSE PRACTITIONER

## 2024-03-25 PROCEDURE — 73630 X-RAY EXAM OF FOOT: CPT | Mod: LEFT SIDE | Performed by: RADIOLOGY

## 2024-03-25 PROCEDURE — 73630 X-RAY EXAM OF FOOT: CPT | Mod: LT

## 2024-03-25 PROCEDURE — 96372 THER/PROPH/DIAG INJ SC/IM: CPT | Performed by: NURSE PRACTITIONER

## 2024-03-25 PROCEDURE — 99283 EMERGENCY DEPT VISIT LOW MDM: CPT

## 2024-03-25 RX ORDER — ORPHENADRINE CITRATE 100 MG/1
100 TABLET, EXTENDED RELEASE ORAL 2 TIMES DAILY PRN
Qty: 10 TABLET | Refills: 0 | Status: SHIPPED | OUTPATIENT
Start: 2024-03-25 | End: 2024-05-07

## 2024-03-25 RX ORDER — KETOROLAC TROMETHAMINE 30 MG/ML
30 INJECTION, SOLUTION INTRAMUSCULAR; INTRAVENOUS ONCE
Status: COMPLETED | OUTPATIENT
Start: 2024-03-25 | End: 2024-03-25

## 2024-03-25 RX ORDER — NAPROXEN 500 MG/1
500 TABLET ORAL 2 TIMES DAILY PRN
Qty: 30 TABLET | Refills: 0 | Status: SHIPPED | OUTPATIENT
Start: 2024-03-25 | End: 2024-03-25 | Stop reason: WASHOUT

## 2024-03-25 RX ORDER — IBUPROFEN 600 MG/1
600 TABLET ORAL EVERY 8 HOURS PRN
Qty: 30 TABLET | Refills: 0 | Status: SHIPPED | OUTPATIENT
Start: 2024-03-25

## 2024-03-25 RX ORDER — ORPHENADRINE CITRATE 30 MG/ML
60 INJECTION INTRAMUSCULAR; INTRAVENOUS ONCE
Status: COMPLETED | OUTPATIENT
Start: 2024-03-25 | End: 2024-03-25

## 2024-03-25 RX ADMIN — ORPHENADRINE CITRATE 60 MG: 60 INJECTION INTRAMUSCULAR; INTRAVENOUS at 14:49

## 2024-03-25 RX ADMIN — KETOROLAC TROMETHAMINE 30 MG: 30 INJECTION, SOLUTION INTRAMUSCULAR at 12:17

## 2024-03-25 ASSESSMENT — PAIN SCALES - GENERAL: PAINLEVEL_OUTOF10: 8

## 2024-03-25 ASSESSMENT — PAIN - FUNCTIONAL ASSESSMENT: PAIN_FUNCTIONAL_ASSESSMENT: 0-10

## 2024-03-25 NOTE — Clinical Note
"Lisa Ramirez was seen and treated in our emergency department on 3/25/2024.  Lisa Ramirez \"Spenser\" may return to school on 03/28/2024.      If you have any questions or concerns, please don't hesitate to call.      Delia Yao, APRN-CNP"

## 2024-03-25 NOTE — ED TRIAGE NOTES
Pt to ED c/o L foot pain that she states has been gradually getting worse. He feels burning sensation in great toe. She denies trauma/falls. Ambulatory in triage with steady gait

## 2024-03-25 NOTE — Clinical Note
"Lisa Ramirez was seen and treated in our emergency department on 3/25/2024.  Lisa Ramirez \"Spenser\" may return to school on 03/26/2024.      If you have any questions or concerns, please don't hesitate to call.      Byron Martinez MD"

## 2024-03-25 NOTE — ED PROVIDER NOTES
"Emergency Department Encounter  [facility] EMERGENCY MEDICINE    Patient: Lisa Ramirez  MRN: [id number]  : 2005  Date of Evaluation: 3/25/2024  ED Provider: GABINO Collier      Chief Complaint       Chief Complaint   Patient presents with    Foot Pain     [place]    (Location/Symptom, Timing/Onset, Context/Setting, Quality, Duration, Modifying Factors, Severity) Note limiting factors.   Limitations to History: none  Historian: self  Records reviewed: EMR inpatient and outpatient notes, Care Everywhere      Lisa Ramirez is a 19 y.o. adult who presents to the emergency department complaining of base of left great toe pain ongoing \"for a while\", is intermittent but states that when she has the pain it affects the base of the great toe, denies any history of diabetes, denies any history of gout, denies any traumatic injury, does have a history of fibromyalgia and states that she also has issues with her joints and her bones.  Denies any fevers, chills.  States that she is unable to go to class due to the pain.  Ambulates with crutches but states that she has pain with ambulation.      ROS:     Review of Systems  14 systems reviewed and otherwise acutely negative except as in the [place].          Past History     Past Medical History:   Diagnosis Date    Asthma     Exercise induced bronchospasm     Asthma, exercise induced    Personal history of diseases of the blood and blood-forming organs and certain disorders involving the immune mechanism     History of iron deficiency anemia    Personal history of other mental and behavioral disorders     History of depression    Personal history of other mental and behavioral disorders     History of OCD (obsessive compulsive disorder)    Personal history of other specified conditions     History of pseudoseizure     No past surgical history on file.  Social History     Socioeconomic History    Marital status: Significant Other     Spouse " name: Not on file    Number of children: Not on file    Years of education: Not on file    Highest education level: Not on file   Occupational History    Not on file   Tobacco Use    Smoking status: Never    Smokeless tobacco: Never   Vaping Use    Vaping Use: Never used   Substance and Sexual Activity    Alcohol use: Never    Drug use: Never    Sexual activity: Not on file   Other Topics Concern    Not on file   Social History Narrative    Not on file     Social Determinants of Health     Financial Resource Strain: Not on file   Food Insecurity: Not on file   Transportation Needs: Not on file   Physical Activity: Not on file   Stress: Not on file   Social Connections: Not on file   Intimate Partner Violence: Not on file   Housing Stability: Not on file       Medications/Allergies     Previous Medications    ACETAMINOPHEN (TYLENOL) 325 MG TABLET    Take 2 tablets (650 mg) by mouth every 4 hours as needed for fever    ALBUTEROL 2.5 MG/0.5 ML NEBULIZER SOLUTION    USE ONE DOSE EVERY 4 HOURS AS NEEDED FOR SHORTNESS OF BREATH    ALBUTEROL 90 MCG/ACTUATION INHALER    Inhale 2 puffs every 4 hours if needed.    BUPROPION XL (WELLBUTRIN XL) 150 MG 24 HR TABLET    Do not crush, chew, or split. Take 14 days prior to appointment and take the last tablet 8 days prior to multiple sleep latency test INSTEAD of the 300mg dose.    BUPROPION XL (WELLBUTRIN XL) 300 MG 24 HR TABLET    TAKE 1 TABLET (300 MG) BY MOUTH ONCE DAILY IN THE MORNING.    CYCLOBENZAPRINE (FLEXERIL) 5 MG TABLET    Take 1 tablet (5 mg) by mouth 3 times a day.    FLUTICASONE PROPION-SALMETEROL (ADVAIR DISKUS) 250-50 MCG/DOSE DISKUS INHALER    Inhale 1 puff 2 times a day. Rinse mouth with water after use to reduce aftertaste and incidence of candidiasis. Do not swallow.    GABAPENTIN (NEURONTIN) 300 MG CAPSULE    Take 1 capsule (300 mg) by mouth once daily at bedtime.    LAMOTRIGINE (LAMICTAL) 100 MG TABLET    Take 1 tablet (100 mg) by mouth once daily.     MELOXICAM (MOBIC) 15 MG TABLET    Take 1 tablet by mouth once daily.    MEMANTINE (NAMENDA) 5 MG TABLET    Take 1 tablet by mouth once daily.    METHOCARBAMOL (ROBAXIN) 750 MG TABLET    Take 1 tablet by mouth three times a day.    NORETHINDRONE (JEF) 0.35 MG TABLET    Take 1 tablet (0.35 mg) by mouth once daily.    PANTOPRAZOLE (PROTONIX) 40 MG EC TABLET    Take 1 tablet (40 mg) by mouth once daily in the morning.     No Known Allergies     Physical Exam       ED Triage Vitals [03/25/24 1150]   Temperature Heart Rate Respirations BP   36.4 °C (97.5 °F) 80 18 117/67      Pulse Ox Temp Source Heart Rate Source Patient Position   98 % Temporal -- --      BP Location FiO2 (%)     -- 21 %         Physical Exam    GENERAL:  The patient appears nourished and normally developed. Vital signs as documented.     HEENT:  Head normocephalic, atraumatic, EOMs intact, PERRLA, Mucous membranes moist. Nares patent without copious rhinorrhea.  No lymphadenopathy.    PULMONARY:  Lungs are clear to auscultation, without any respiratory distress. Able to speak full sentences, no accessory muscle use    CARDIAC:   Normal rate. No murmurs, rubs or gallops    ABDOMEN:  Soft, non distended, non tender, BS positive x 4 quadrants, No rebound or guarding, no peritoneal signs, no CVA tenderness, no masses or organomegaly      MUSCULOSKELETAL:   Able to ambulate, Non edematous, with no obvious deformities. Pulses intact distal, tenderness on palpation to base of left great toe with no erythema, no obvious effusion, no edema, +2 pulses DP, PT bilateral lower extremities    SKIN:   Good color, with no significant rashes.  No pallor.    NEURO:  No obvious neurological deficits, normal sensation and strength bilaterally.  Able to follow commands, NIH 0, CN 2-12 intact.        Diagnostics   Labs:  Labs Reviewed - No data to display  Radiographs:  XR foot left 3+ views   Final Result   No acute osseous findings.   Signed by Chapincito Barrera II, MD     "            Assessment   In brief, Lisa Ramirez is a 19 y.o. adult who presented to the emergency department for acute on chronic left great toe pain, atraumatic    Plan   X-ray, NSAIDs    Differentials   Strain  Contusion  Fracture  Arthritis  Gout, pseudogout  Septic joint    ED Course     Diagnoses as of 03/25/24 1459   Great toe pain, left       Visit Vitals  /67   Pulse 80   Temp 36.4 °C (97.5 °F) (Temporal)   Resp 18   Ht 1.676 m (5' 6\")   Wt 99.8 kg (220 lb)   SpO2 98%   BMI 35.51 kg/m²   Smoking Status Never   BSA 2.16 m²       Medications   ketorolac (Toradol) injection 30 mg (30 mg intramuscular Given 3/25/24 1217)   orphenadrine (Norflex) injection 60 mg (60 mg intramuscular Given 3/25/24 1449)       Plan of care discussed, patient is stable appearing, in no distress, given Toradol IM for pain and inflammation, sent for imaging, results reviewed and discussed, do not believe this is a septic joint due to patient being afebrile, has no effusion, has no point tenderness and has good passive range of motion to the left great toe.  Symptoms have been ongoing acute on chronic, given NSAIDs, plan will be for outpatient follow-up with orthopedics or podiatry and patient should be stable for discharge.  Patient is adamant that she is not in extreme amounts of pain and refuses steroid treatment, states that it makes her hungry and that she was still in pain previously when she was on steroids.  Was offered other analgesics however we discussed opioid prescriptions and we will not be providing this as an outpatient but would offer to give her some pain meds in the emergency department.  Patient extremely agitated over her functionality and states that she would like an MRI, discussed with patient that there is no necessity for an MRI at this time, has no signs of bony infection, no fracture or dislocation noted on imaging.  Patient is neurovascularly intact with palpable pulses, good capillary refill.  " "Patient states that she looked at the images and believes that she has a dislocation because the \"bones are in a different place than they were on a previous x-ray\".  Discussed with patient that her alignment on the x-ray from today is normal and there is no signs of dislocation and patient has had no traumatic injury to support a potential dislocation.  Spoke with orthopedics for recommendations on follow-up and provided recommendations to the patient.  Patient was offered Ace wrap, postop shoe, crutches and patient states that she cannot use full crutches due to her disability, declined postop shoe.  Patient agitated that she did not have her leg elevated while she was in the emergency department.  Patient was seen and evaluated by ED attending, given Norflex, will be discharged home and advised to follow-up with orthopedics, patient states that she had previously called orthopedics and was not able to get an appointment \"for months\".  But did not schedule an appointment for follow-up.  Patient was encouraged to follow-up as an outpatient for persistent issues, did report that she has had pain to the base of her toe in the past and has not had follow-up but states that she wants answers now since this is a new episode of her pain in her great toe.  Educated on any worsening signs and symptoms to return to the emergency department      Final Impression      1. Great toe pain, left          DISPOSITION  Disposition:   Patient condition is: Stable    Comment: Please note this report has been produced using speech recognition software and may contain errors related to that system including errors in grammar, punctuation, and spelling, as well as words and phrases that may be inappropriate.  If there are any questions or concerns please feel free to contact the dictating provider for clarification.    GEORGE Collier-GABINO Cardona  03/25/24 1354       GABINO Collier  03/25/24 " 1426       GEORGE Collier-CNP  03/25/24 6953

## 2024-03-27 ENCOUNTER — APPOINTMENT (OUTPATIENT)
Dept: NEUROLOGY | Facility: HOSPITAL | Age: 19
End: 2024-03-27
Payer: COMMERCIAL

## 2024-04-17 ENCOUNTER — CLINICAL SUPPORT (OUTPATIENT)
Dept: SLEEP MEDICINE | Facility: HOSPITAL | Age: 19
End: 2024-04-17
Payer: COMMERCIAL

## 2024-04-17 DIAGNOSIS — G47.10 HYPERSOMNIA, UNSPECIFIED: ICD-10-CM

## 2024-04-17 DIAGNOSIS — G47.10 HYPERSOMNIA: Primary | ICD-10-CM

## 2024-04-17 DIAGNOSIS — G47.19 EXCESSIVE DAYTIME SLEEPINESS: ICD-10-CM

## 2024-04-17 PROCEDURE — 95810 POLYSOM 6/> YRS 4/> PARAM: CPT | Performed by: PSYCHIATRY & NEUROLOGY

## 2024-04-18 ENCOUNTER — APPOINTMENT (OUTPATIENT)
Dept: SLEEP MEDICINE | Facility: HOSPITAL | Age: 19
End: 2024-04-18
Payer: COMMERCIAL

## 2024-04-18 VITALS
SYSTOLIC BLOOD PRESSURE: 120 MMHG | HEIGHT: 66 IN | WEIGHT: 218.26 LBS | DIASTOLIC BLOOD PRESSURE: 80 MMHG | BODY MASS INDEX: 35.08 KG/M2

## 2024-04-18 ASSESSMENT — PAIN SCALES - GENERAL: PAINLEVEL: 0-NO PAIN

## 2024-04-18 NOTE — PROGRESS NOTES
[facility] TECH NOTE:     Patient: Lisa Ramirez   MRN//AGE: [id number]  2005  19 y.o.   Technologist: GRACE MADRIGAL   Room: 3   Service Date: 2024        Sleep Testing Location: [facility]     [place]: 23, 31    TECHNOLOGIST SLEEP STUDY PROCEDURE NOTE:   This sleep study is being conducted according to the policies and procedures outlined by the AAS accreditation standards.  The sleep study procedure and processes involved during this appointment was explained to the patient/patient’s family, questions were answered. The patient/family verbalized understanding.      The patient is a 19 y.o. year old adult scheduled for aDiagnostic PSG Split night with montage of: PSG  Spenser arrived for [facility]'s appointment.      The study that was ultimately completed was aDiagnostic PSG Split night with montage of:PSG    The full study Was completed.  Patient questionnaires completed?: yes     Consents signed? yes    Initial Fall Risk Screening:     Lisa has not fallen in the last 6 months. Spenser's did not result in injury. Lisa does not have a fear of falling. He does not need assistance with sitting, standing, or walking. Spenser does not need assistance walking in Spenser's home. Spenser does not need assistance in an unfamiliar setting. The patient is notusing an assistive device.     Brief Study observations: n/a    Deviation to order/protocol and reason: Called  on call and spoke with Dr Jacobson @7820. Pt order was for PSG.  Order for MSLT was cancelled on .  Pt states he is here for MSLT in am. Dr on call called fellow agreed to run pt as MSLT.       If PAP, which was preferred mask/pressure/mode: n/a     Other: Physician on call was called..     After the procedure, the patient/family was informed to ensure followup with ordering clinician for testing results.      Technologist: GRACE MADRIGAL

## 2024-04-29 PROCEDURE — RXMED WILLOW AMBULATORY MEDICATION CHARGE

## 2024-04-30 ENCOUNTER — TELEMEDICINE (OUTPATIENT)
Dept: PRIMARY CARE | Facility: CLINIC | Age: 19
End: 2024-04-30
Payer: COMMERCIAL

## 2024-04-30 ENCOUNTER — APPOINTMENT (OUTPATIENT)
Dept: NEUROLOGY | Facility: HOSPITAL | Age: 19
End: 2024-04-30
Payer: COMMERCIAL

## 2024-04-30 DIAGNOSIS — J45.909 ASTHMA, ACUTE (HHS-HCC): ICD-10-CM

## 2024-04-30 PROCEDURE — 99443 PR PHYS/QHP TELEPHONE EVALUATION 21-30 MIN: CPT

## 2024-04-30 RX ORDER — FERROUS SULFATE 325(65) MG
TABLET ORAL
COMMUNITY
Start: 2022-09-29

## 2024-04-30 RX ORDER — MONTELUKAST SODIUM 10 MG/1
1 TABLET ORAL NIGHTLY
COMMUNITY

## 2024-04-30 RX ORDER — DROSPIRENONE 4 MG/1
1 TABLET, FILM COATED ORAL DAILY
COMMUNITY
Start: 2022-09-29

## 2024-04-30 RX ORDER — DULOXETIN HYDROCHLORIDE 60 MG/1
CAPSULE, DELAYED RELEASE ORAL
COMMUNITY

## 2024-04-30 RX ORDER — BUDESONIDE AND FORMOTEROL FUMARATE DIHYDRATE 160; 4.5 UG/1; UG/1
AEROSOL RESPIRATORY (INHALATION) 2 TIMES DAILY
COMMUNITY
Start: 2022-09-29

## 2024-04-30 ASSESSMENT — PAIN SCALES - GENERAL: PAINLEVEL: 6

## 2024-04-30 NOTE — LETTER
April 30, 2024     Patient: Lisa Ramirez   YOB: 2005   Date of Visit: 4/30/2024       To Whom It May Concern:    Lisa Ramirez was seen in my clinic on 4/30/2024 at 2:30 pm. Please excuse Lisa for Spenser's absence from school on this day to make the appointment. She is also undergoing evaluation for multiple medical issues. Please excuse her absence and any late assignment submissions that occurred due to her health.    If you have any questions or concerns, please don't hesitate to call.         Sincerely,         Zohaib Barahona MD        CC: No Recipients

## 2024-04-30 NOTE — PROGRESS NOTES
"Subjective   Patient ID:   Lisa Ramirez \"Spenser\" is a 19 y.o. adult who presents for Med Refill (Pt has other concerns).  HPI  #Establish care  #Depression  #Med Refill  - Requested refill on Wellbutrin and ran out a few days ago  - Reported has been on this medication for years  - Used to follow with a psychiatrist who would refill the prescription but reported no longer needing to see them  - Denied any SI or HI or hallucinations    #Seizure  - Reported they had a seizure on Saturday and is unsure how long it lasted  - Was with a friend who reported it lasted less than 15 minutes  - Lost bladder function during the episode and reported feeling \"knocked out\" for the rest of the day  - Stated last seizure they had was in late February  - Reports being compliant with her medications and is concerned that she still had a seizure on her meds    #c/f Abnormal sleep study  - Reports having hypersomnia and being worked up for it  - Concerned as she was not able to have a MSLT due to coverage by her insurance  - Is concerned that her hypersomnia, depression, and seizures have made her fall behind in school. Reports missing classes and assignments due to her symptoms    Review of Systems   Respiratory:  Negative for stridor.    Psychiatric/Behavioral:  Negative for agitation and suicidal ideas. The patient is nervous/anxious.        Objective   There were no vitals taken for this visit.   Physical Exam  Constitutional:       General: Spenser is not in acute distress.     Appearance: Normal appearance. Spenser is not ill-appearing.   HENT:      Head: Normocephalic and atraumatic.   Eyes:      Extraocular Movements: Extraocular movements intact.   Pulmonary:      Effort: Pulmonary effort is normal. No respiratory distress.   Musculoskeletal:      Cervical back: Normal range of motion.   Neurological:      Mental Status: Spenser is alert.   Psychiatric:         Mood and Affect: Mood is anxious.         Behavior: Behavior normal. " "        Assessment/Plan     Problem List Items Addressed This Visit    None  Lisa Ramirez \"Spenser\" is a 19 y.o. right handed gender-queer patient (preferred pronouns \"he/they;\" assigned female sex at birth) with a history notable for asthma, fibromyalgia, depression, and paroxysmal events who presents to the clinic for a telehealth visit to establish care.    #Seizure  #Med Refill  - Given Wellbutrin decreases seizure threshold, I feel it is unsafe to refill at this time  - Reached out to her Neurologist to discuss safety of refilling Wellbutrin given her recent seizure  - Encouraged to make a follow up appointment with neuro as soon as possible  - Encouraged to reach out to her psychiatrist for a follow up appointment to discuss potentially switching to a different antidepressant if Wellbutrin is unsafe to restart    #Hypersomnia  - Encouraged to follow up with neurology to reorder a MSLT for further work up    #School excuse  - Given school excuse letter given her recent symptoms       The patient was discussed with Dr. Luu.    Zohaib Barahona MD  Family Medicine   PGY-2  "

## 2024-05-02 ENCOUNTER — PHARMACY VISIT (OUTPATIENT)
Dept: PHARMACY | Facility: CLINIC | Age: 19
End: 2024-05-02
Payer: COMMERCIAL

## 2024-05-02 ENCOUNTER — DOCUMENTATION (OUTPATIENT)
Dept: NEUROLOGY | Facility: HOSPITAL | Age: 19
End: 2024-05-02
Payer: COMMERCIAL

## 2024-05-02 DIAGNOSIS — G47.19 EXCESSIVE DAYTIME SLEEPINESS: Primary | ICD-10-CM

## 2024-05-02 ASSESSMENT — ENCOUNTER SYMPTOMS
AGITATION: 0
STRIDOR: 0
NERVOUS/ANXIOUS: 1

## 2024-05-02 NOTE — PROGRESS NOTES
I reviewed the resident/fellow's documentation and discussed the patient with the resident/fellow. I agree with the resident/fellow's medical decision making as documented in the note.   Telehealth visit for a patient who is new to our clinic.  Has h/o seizure disorder and reports having had a seizure a couple of days prior to the appointment.  Has been out of Wellbutrin for a couple of days.  Will need to f/u with Neurology and would not consider refilling Wellbutrin without their specific consent, given the seizure disorder and recent seizure.  NO SI or HI.  Should f/u with psych as well to discuss safer treatment options.    Alisson Luu MD

## 2024-05-02 NOTE — PROGRESS NOTES
Reached out to patient via [employer] about PSG results. No MSLT obtained because coverage was refused by patient's insurance company. Since sleep disordered breathing is now ruled-out and early-onset REM is suspect, MSLT is indicated to evaluate for narcolepsy. Re-ordering now. Encourage use of sleep log. Staffed with Dr. Luis E Padilla. Patient still pending an appointment with sleep medicine.    Maurice Aleman MD PGY-3  [facility]  [facility] [employer] [facility] [employer]

## 2024-05-21 DIAGNOSIS — J45.909 ASTHMA, ACUTE (HHS-HCC): ICD-10-CM

## 2024-05-25 RX ORDER — BUPROPION HYDROCHLORIDE 300 MG/1
300 TABLET ORAL EVERY MORNING
Qty: 90 TABLET | Refills: 3 | Status: SHIPPED | OUTPATIENT
Start: 2024-05-25 | End: 2025-05-25

## 2024-06-11 ENCOUNTER — TELEMEDICINE (OUTPATIENT)
Dept: PRIMARY CARE | Facility: CLINIC | Age: 19
End: 2024-06-11
Payer: COMMERCIAL

## 2024-06-11 DIAGNOSIS — J45.909 ASTHMA, ACUTE (HHS-HCC): Primary | ICD-10-CM

## 2024-06-11 DIAGNOSIS — R56.9 SEIZURE (MULTI): ICD-10-CM

## 2024-06-11 DIAGNOSIS — G89.4 CHRONIC PAIN SYNDROME: ICD-10-CM

## 2024-06-11 PROCEDURE — 99213 OFFICE O/P EST LOW 20 MIN: CPT | Performed by: INTERNAL MEDICINE

## 2024-06-11 NOTE — PROGRESS NOTES
INTERNAL MEDICINE - PRIMARY CARE  Established patient visit - video visit      Lisa Ramirez is 19 y.o. a who presents for Pain     Problem list   Asthma with history of recent hospitalization  Seizures   Left foot injury   Unintentional weight loss  Chronic pain     HPI   Currently in [place] for summer with plans to return to [place] in the fall to continue school.    Accommodations - Asking for accommodations for school due to:   Chronic pain - walking with crutches daily. Has a hard time ambulating to buildings across campus.  Sleep issues - currently working with sleep medicine, but Spenser describes falling asleep frequently during class, constantly feeling tired. Awaiting new sleep study.   Seizures - on medication for this, no recent   Concentration issues - needs more time to complete tasks, tests and assignments.    Unclear why previous accommodations were revoked. Spenser has decided not to take classes for the summer; also hoping this is all handled by the time they return to school this fall.     Review of Systems   Constitutional:  Negative for activity change.   Musculoskeletal:  Positive for arthralgias, back pain, gait problem, myalgias and neck pain.   Neurological:  Positive for dizziness, seizures and headaches.   Psychiatric/Behavioral:  Positive for decreased concentration, dysphoric mood and sleep disturbance.       No Known Allergies   Physical Exam     Visit Vitals  Smoking Status Never      Appears well, no distress  Breathing comfortably on video    Cranial nerves grossly intact      Medications     Current Outpatient Medications   Medication Instructions    acetaminophen (Tylenol) 325 mg tablet Take 2 tablets (650 mg) by mouth every 4 hours as needed for fever    albuterol 2.5 mg/0.5 mL nebulizer solution USE ONE DOSE EVERY 4 HOURS AS NEEDED FOR SHORTNESS OF BREATH    albuterol 90 mcg/actuation inhaler 2 puffs, inhalation, Every 4 hours PRN    budesonide-formoteroL (Symbicort) 160-4.5  mcg/actuation inhaler inhalation, 2 times daily    buPROPion XL (Wellbutrin XL) 300 mg 24 hr tablet TAKE 1 TABLET (300 MG) ORALLY DAILY IN THE MORNING    buPROPion XL (Wellbutrin XL) 300 mg 24 hr tablet TAKE 1 TABLET (300 MG) BY MOUTH ONCE DAILY IN THE MORNING.    cyclobenzaprine (FLEXERIL) 5 mg, oral, 3 times daily    drospirenone, contraceptive, (Slynd) 4 mg (28) tablet 1 tablet, oral, Daily    DULoxetine (Cymbalta) 60 mg DR capsule oral    ferrous sulfate, 325 mg ferrous sulfate, tablet oral, Daily RT    fluticasone propion-salmeteroL (Advair Diskus) 250-50 mcg/dose diskus inhaler 1 puff, inhalation, 2 times daily RT, Rinse mouth with water after use to reduce aftertaste and incidence of candidiasis. Do not swallow.    gabapentin (NEURONTIN) 300 mg, oral, Nightly    ibuprofen 600 mg, oral, Every 8 hours PRN    ipratropium (Atrovent) 17 mcg/actuation inhaler inhalation, Daily RT    lamoTRIgine (LAMICTAL) 100 mg, oral, Daily    meloxicam (Mobic) 15 mg tablet Take 1 tablet by mouth once daily.    meloxicam (Mobic) 7.5 mg tablet Take 1-2 tablets by mouth daily after completing medrol dose pack.    memantine (Namenda) 5 mg tablet Take 1 tablet by mouth once daily.    methocarbamol (Robaxin) 750 mg tablet Take 1 tablet by mouth three times a day.    methylPREDNISolone (Medrol Dospak) 4 mg tablets Take as directed    montelukast (Singulair) 10 mg tablet 1 tablet, oral, Nightly    norethindrone (JEF) 0.35 mg, oral, Daily    orphenadrine (NORFLEX) 100 mg, oral, 2 times daily PRN, Do not crush, chew, or split.    pantoprazole (PROTONIX) 40 mg, oral, Every morning        Recent Labs     Lab Results   Component Value Date    HGBA1C 5.7 (A) 02/06/2023    CHOL 100 09/01/2022    LDLF 49 09/01/2022    HDL 37.8 (A) 09/01/2022    AST 10 02/07/2024    ALT 10 02/07/2024    BILITOT 0.5 02/07/2024        Lab Results   Component Value Date    WBC 10.6 02/07/2024    HGB 12.7 02/07/2024    HCT 37.9 02/07/2024    MCV 73 (L) 02/07/2024      02/07/2024          Chemistry    Lab Results   Component Value Date/Time     02/07/2024 2029    K 3.6 02/07/2024 2029     02/07/2024 2029    CO2 25 02/07/2024 2029    BUN 8 02/07/2024 2029    CREATININE 0.66 02/07/2024 2029    Lab Results   Component Value Date/Time    CALCIUM 9.5 02/07/2024 2029    ALKPHOS 59 02/07/2024 2029    AST 10 02/07/2024 2029    ALT 10 02/07/2024 2029    BILITOT 0.5 02/07/2024 2029             Assessment/Plan    Plan to fill out disability paperwork for school      RTC prn    Omayra Buitrago MD MPH  I am the attending physician.

## 2024-06-14 ASSESSMENT — ENCOUNTER SYMPTOMS
NECK PAIN: 1
SEIZURES: 1
SLEEP DISTURBANCE: 1
HEADACHES: 1
ACTIVITY CHANGE: 0
DYSPHORIC MOOD: 1
BACK PAIN: 1
MYALGIAS: 1
DIZZINESS: 1
ARTHRALGIAS: 1
DECREASED CONCENTRATION: 1

## 2024-08-10 DIAGNOSIS — M79.675 GREAT TOE PAIN, LEFT: ICD-10-CM

## 2024-08-19 RX ORDER — ORPHENADRINE CITRATE 100 MG/1
100 TABLET, EXTENDED RELEASE ORAL 2 TIMES DAILY PRN
Qty: 10 TABLET | Refills: 0 | Status: CANCELLED | OUTPATIENT
Start: 2024-08-19 | End: 2024-08-24

## 2024-08-19 RX ORDER — IBUPROFEN 600 MG/1
600 TABLET ORAL EVERY 8 HOURS PRN
Qty: 30 TABLET | Refills: 0 | Status: CANCELLED | OUTPATIENT
Start: 2024-08-19

## 2024-08-27 PROCEDURE — RXMED WILLOW AMBULATORY MEDICATION CHARGE

## 2024-08-28 ENCOUNTER — PHARMACY VISIT (OUTPATIENT)
Dept: PHARMACY | Facility: CLINIC | Age: 19
End: 2024-08-28
Payer: COMMERCIAL

## 2024-09-17 NOTE — PROGRESS NOTES
"       [facility] Sleep Medicine  [facility]  [address]  [facility] [address] [phone]     [facility] Sleep Medicine Clinic  New Visit Note      Subjective   Patient ID: Lisa Ramirez \"Alf" is a 19 y.o. adult with past medical history significant for Obesity, Anxiety, Depression, and Hypersomnia.     9/18/2024: The patient is here and uses a cane to ambulate alone today. His neurologist referred him for comprehensive sleep medicine evaluation due to excessive daytime sleepiness/fatigue. 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 He reports having one episode when he laughed loudly and fell asleep immediately when he titrated her psychiatric medication. He had an in-lab sleep study, which revealed no JAYDE. Besides that, he reports hallucinations and wakes 4-5 times during the night for unknown reasons. He states that he is very frustrated by his excessive daytime sleepiness. He also mentioned both of her parents having hypersomnia issues but did not get an official diagnosis. His ESS: 23,  VINOD:  17 today.    HPI  Patient had been having these symptoms for the past 10 years. Tried  but no relief.   Patient had PSG in 2024 which showed no OBSTRUCTIVE SLEEP APNEA..      SLEEP STUDY HISTORY: (personally reviewed raw data such as interpretation report, data sheet, hypnogram, and titration table if available and applicable)  4/17/24: PSG: BMI:35.5, RDI 3%: 1.8/hr, RDI 4%: 0.5/hr, Thor: 93%, <88%: 0 minutes    SLEEP-WAKE SCHEDULE  Bedtime: 11 PM on weekdays, same on weekends  Subjective sleep latency: 5 minutes  Problems falling asleep: No  Number of awakenings: 5 times per night spontaneously for unknown reasons  Falls back asleep in 5 minutes  Problems staying asleep: Yes  Final wake time: 7 AM on weekdays, 9-10 AM on weekends  Shift " work: No  Naps: Yes, 20 minutes, 3 times  Feels rested after a nap: Yes   Average sleep duration (excluding naps): 8 hours    SLEEP ENVIRONMENT  Sleep location: bed  Sleep status: sleeps alone  Room is dark:  Yes  Room is quiet: Yes  Room is cool: Yes  Bed comfort: good    SLEEP HABITS:   Activities before bedtime: read a book  Activities in bed: no  Preferred sleep position: side    SLEEP ROS:  Night symptoms: Denies  Morning symptoms: POSITIVE for unrefreshing sleep  Daytime symptoms POSITIVE for excessive daytime sleepiness, fatigue, trouble remembering things in daytime, trouble staying focused in daytime, and irritability in daytime  Hypersomnia / narcolepsy symptoms: POSITIVE for sleep paralysis, sleep-related hallucinations, and cataplexy  RLS symptoms: Patient denies RLS symptoms.  Movements in sleep: Patient denies problematic movements in sleep such as seizures during sleep, frequent leg kicks / jerks while asleep, sleep-related bruxism, and waking up with bedsheets in disarray.  Parasomnia symptoms: Patient denies symptoms of parasomnia such as sleepwalking, sleeptalking, sleep-eating, acting out dreams, and nightmares.     WEIGHT: stable    REVIEW OF SYSTEMS: All other systems have been reviewed and are negative.    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 No significant changes unless documented in the pertinent chart section. Updates made when necessary.       Objective   Vitals:    09/18/24 1040   BP: 104/67   Pulse: 78   Temp: 36.4 °C (97.6 °F)   SpO2: 97%         REVIEW OF SYSTEMS  Review of Systems are all  "negative      ALLERGIES  No Known Allergies      Physical Exam  Constitutional:alert and oriented to time, place, and person  Sinus: - tenderness to palpation  Palate: Narrow Mallampati 3, - Tongue scalloping, - macroglossia  Lungs: Clear to auscultation bilateral, no rales  Heart: Regular rate and rhythm, no murmurs    Assessment/Plan   Lisa Ramirez \"Spenser\" is a 19 y.o. adult who is seen to evaluate for hypersomnia, possible obstructive sleep apnea. The pathophysiology of sleep apnea, diagnostic testing (HST vs PSG), treatment options (PAP, oral appliance, surgery, hypoglossal nerve stimulator called Inspire), and supportive management (weight loss, positional therapy, smoking cessation, avoidance of alcohol and sedatives) were discussed with the patient in detail. Risk factors of sleep apnea as well as cardiometabolic and neurocognitive sequelae associated with untreated sleep apnea were also discussed. Lastly, patient was advised to avoid driving vehicle or operating heavy machinery when sleepy.     Lisa Ramirez \"Spenser\" with the following problems:     #IDIOPATHIC HYPERSOMNIA VS. NARCOLEPSY:  - Will order an overnight sleep study, followed by MSLT the next day.  - Perform urine toxicology prior to sleep study.  - May consider checking TSH/ HLA/CBC/CMP/iron profile and iron studies to rule out underlying metabolic etiologies.  -Scheduled every 2 hours to have 10-15 minutes nap to ease her EDS. -Write a letter to her professors to be mentioned.  - Consolidate night time sleep.  - Instructed the patient to fill a two-week sleep diary to be reviewed, hold two weeks of anti-psychiatrist / depression medications, and discuss with her provider.   - Will discuss follow up plan after study completed.        # CHRONIC SLEEP MAINTENANCE INSOMNIA:  -likely due to hypersomnia.  -VINOD: 17  -Sleep hygiene discuss in the clinic.    # DEPRESSION and ANXIETY:   -Lisa Ramirez \"Spenser\"is taking buPROPion XL (Wellbutrin XL) " "150 mg and participating in psychotherapy.  -Denies HI/SI     # OBESITY :  -with a BMI of 35.5. Lisa Ramirez \"Spenser\" most recent Bicarbonate was 25  Bicarbonate   Date Value Ref Range Status   02/07/2024 25 21 - 32 mmol/L Final   -Encourage to have regular exercise to manage weight well.     RTC 2-3 weeks after sleep study       All of patient's questions were answered. He verbalizes understanding and agreement with my assessment and plan.  "

## 2024-09-18 ENCOUNTER — OFFICE VISIT (OUTPATIENT)
Dept: SLEEP MEDICINE | Facility: HOSPITAL | Age: 19
End: 2024-09-18
Payer: COMMERCIAL

## 2024-09-18 ENCOUNTER — PATIENT MESSAGE (OUTPATIENT)
Dept: SLEEP MEDICINE | Facility: HOSPITAL | Age: 19
End: 2024-09-18

## 2024-09-18 VITALS
OXYGEN SATURATION: 97 % | HEART RATE: 78 BPM | TEMPERATURE: 97.6 F | BODY MASS INDEX: 37.41 KG/M2 | SYSTOLIC BLOOD PRESSURE: 104 MMHG | DIASTOLIC BLOOD PRESSURE: 67 MMHG | WEIGHT: 230 LBS

## 2024-09-18 DIAGNOSIS — E55.9 VITAMIN D DEFICIENCY: ICD-10-CM

## 2024-09-18 DIAGNOSIS — G47.10 HYPERSOMNIA: ICD-10-CM

## 2024-09-18 DIAGNOSIS — D50.9 IRON DEFICIENCY ANEMIA, UNSPECIFIED IRON DEFICIENCY ANEMIA TYPE: ICD-10-CM

## 2024-09-18 DIAGNOSIS — E66.9 OBESITY (BMI 30-39.9): ICD-10-CM

## 2024-09-18 DIAGNOSIS — G47.30 SLEEP DISORDER BREATHING: ICD-10-CM

## 2024-09-18 DIAGNOSIS — F32.A DEPRESSION, UNSPECIFIED DEPRESSION TYPE: ICD-10-CM

## 2024-09-18 DIAGNOSIS — G47.421 NARCOLEPSY DUE TO UNDERLYING CONDITION WITH CATAPLEXY (HHS-HCC): ICD-10-CM

## 2024-09-18 DIAGNOSIS — F41.9 ANXIETY: ICD-10-CM

## 2024-09-18 DIAGNOSIS — G47.411 NARCOLEPSY WITH CATAPLEXY (HHS-HCC): Primary | ICD-10-CM

## 2024-09-18 PROCEDURE — G2211 COMPLEX E/M VISIT ADD ON: HCPCS

## 2024-09-18 PROCEDURE — 1036F TOBACCO NON-USER: CPT

## 2024-09-18 PROCEDURE — 99214 OFFICE O/P EST MOD 30 MIN: CPT

## 2024-09-18 PROCEDURE — 99204 OFFICE O/P NEW MOD 45 MIN: CPT

## 2024-09-18 SDOH — ECONOMIC STABILITY: FOOD INSECURITY: WITHIN THE PAST 12 MONTHS, THE FOOD YOU BOUGHT JUST DIDN'T LAST AND YOU DIDN'T HAVE MONEY TO GET MORE.: NEVER TRUE

## 2024-09-18 SDOH — ECONOMIC STABILITY: FOOD INSECURITY: WITHIN THE PAST 12 MONTHS, YOU WORRIED THAT YOUR FOOD WOULD RUN OUT BEFORE YOU GOT MONEY TO BUY MORE.: NEVER TRUE

## 2024-09-18 ASSESSMENT — SLEEP AND FATIGUE QUESTIONNAIRES
HOW LIKELY ARE YOU TO NOD OFF OR FALL ASLEEP WHILE SITTING AND READING: HIGH CHANCE OF DOZING
HOW LIKELY ARE YOU TO NOD OFF OR FALL ASLEEP WHEN YOU ARE A PASSENGER IN A CAR FOR AN HOUR WITHOUT A BREAK: HIGH CHANCE OF DOZING
SATISFACTION_WITH_CURRENT_SLEEP_PATTERN: VERY DISSATISFIED
HOW LIKELY ARE YOU TO NOD OFF OR FALL ASLEEP WHILE SITTING AND TALKING TO SOMEONE: MODERATE CHANCE OF DOZING
ESS-CHAD TOTAL SCORE: 23
HOW LIKELY ARE YOU TO NOD OFF OR FALL ASLEEP WHILE SITTING QUIETLY AFTER LUNCH WITHOUT ALCOHOL: HIGH CHANCE OF DOZING
SITING INACTIVE IN A PUBLIC PLACE LIKE A CLASS ROOM OR A MOVIE THEATER: HIGH CHANCE OF DOZING
HOW LIKELY ARE YOU TO NOD OFF OR FALL ASLEEP WHILE LYING DOWN TO REST IN THE AFTERNOON WHEN CIRCUMSTANCES PERMIT: HIGH CHANCE OF DOZING
WORRIED_DISTRESSED_DUE_TO_SLEEP: MUCH
SLEEP_PROBLEM_INTERFERES_DAILY_ACTIVITIES: VERY MUCH NOTICEABLE
DIFFICULTY_STAYING_ASLEEP: MODERATE
HOW LIKELY ARE YOU TO NOD OFF OR FALL ASLEEP IN A CAR, WHILE STOPPED FOR A FEW MINUTES IN TRAFFIC: HIGH CHANCE OF DOZING
HOW LIKELY ARE YOU TO NOD OFF OR FALL ASLEEP WHILE WATCHING TV: HIGH CHANCE OF DOZING
SLEEP_PROBLEM_NOTICEABLE_TO_OTHERS: VERY MUCH NOTICEABLE

## 2024-09-18 ASSESSMENT — PAIN SCALES - GENERAL: PAINLEVEL: 0-NO PAIN

## 2024-09-18 NOTE — PATIENT INSTRUCTIONS
[facility] Sleep Medicine  [facility]  [address]  [facility] [address] [phone]       Thank you for coming to the Sleep Medicine Clinic today! Your sleep medicine provider today was: GABINO Negrete Below is a summary of your treatment plan, patient education, other important information, and our contact numbers.    Dear  Lisacyn Ramirez       Your Sleep Provider Today: GABINO Negrete    Thank you for visiting  Sleep Medicine Clinic !     1. We will proceed with an in-lab sleep apnea test, followed by a MSLT. The lab will call you and schedule it for you.   - We will check your TSH/ HLA/CBC/CMP/iron profile to rule out underlying metabolic etiologies.  - We will collect urine drug text on the same day of MSLT.  - Please complete a two-week sleep diary to be reviewed, hold two weeks of anti-psy/ depression medications, and discuss with your provider.   - Scheduled naps,   - Consolidate nighttime sleep.  - will reevaluate it and sign an agreement if needed next visit    2. Please do not drive when you are sleepy and start practicing the sleep hygiene as discussed in clinic.    3. FOR QUESTIONS AND CONCERNS:   a) : To schedule, cancel, or reschedule SLEEP STUDY appointments, please call [phone]  b): Please call my office with issues or questions: [phone] (Rigoberto); [phone] ([place]); [phone] (Kaylee)    In the event that you are running more than 10 minutes late to your appointment, I will kindly ask you to reschedule. Thanks.      TREATMENT PLAN     Follow-up Appointment:   Follow-up in 2-3 weeks after sleep study.    PATIENT EDUCATION     OBSTRUCTIVE SLEEP APNEA (JAYDE) is a sleep disorder where your upper airway muscles relax during sleep and the airway intermittently and repetitively narrows and collapses leading to partially blocked airway (hypopnea) or completely blocked airway (apnea)  which, in turn, can disrupt breathing in sleep, lower oxygen levels while you sleep and cause night time wakings. Because both apnea and hypopnea may cause higher carbon dioxide or low oxygen levels, untreated JAYDE can lead to heart arrhythmia, elevation of blood pressure, and make it harder for the body to consolidate memory and facilitate metabolism (leading to higher blood sugars at night). Frequent partial arousals occur during sleep resulting in sleep deprivation and daytime sleepiness. JAYDE is associated with an increased risk of cardiovascular disease, stroke, hypertension, and insulin resistance. Moreover, untreated JAYDE with excessive daytime sleepiness can increase the risk of motor vehicular accidents.    Below are conservative strategies for JAYDE regardless of JAYDE severity are:   Positional therapy - Avoid sleeping on your back.   Healthy diet and regular exercise to optimize weight is highly encouraged.   Avoid alcohol late in the evening and sedative-hypnotics as these substances can make sleep apnea worse.   Improve breathing through the nose with intranasal steroid spray, saline rinse, or antihistamines    Safety: Avoid driving vehicle and operating heavy equipment while sleepy. Drowsy driving may lead to life-threatening motor vehicle accidents. A person driving while sleepy is 5 times more likely to have an accident. If you feel sleepy, pull over and take a short power nap (sleep for less than 30 minutes). Otherwise, ask somebody to drive you.    Treatment options for sleep apnea include weight management, positional therapy, Positive Airway Therapy (PAP) therapy, oral appliance therapy, hypoglossal nerve stimulator (Inspire) and select airway surgeries.    Sleep Testing for sleep apnea: The best and ideal way to check out if you have sleep apnea is to do an overnight sleep study in the sleep laboratory. Alternatively, a home sleep apnea test can also be done depending on your insurance and risk  factors.     If you are having a home sleep apnea test, kindly allot 1 hour during pickup of the testing kit as you will have to complete paperwork and listen to the sleep technician for in-person on-the-spot demonstration and instructions on how to hook up the testing kit at home. Do the test for 1 day and start off with sleeping on your back. If you sleep on your side in the middle of night or you have always been a side or stomach-sleeper, it is ok as long as you have some time on your back and off-back.     If you are having an overnight in-lab sleep study, please make sure to bring toiletries, a comfy pillow, additional warm blankets, and any nighttime medications (include as-needed inhaler, pain pill, etc) that you may regularly take. Also, be sure to eat dinner before you arrive as we generally do not provide meals inside the sleep testing center. Lastly, in order to fall asleep faster in the sleep testing center, we advise patients to wake up 2 hours earlier on the morning of scheduled testing and avoid napping 2 days prior testing. Sometimes, your sleep provider may prescribe a sleep aid to be taken at lights out in the sleep testing center. If you are taking a sleep aid, consider having somebody pick you up after the sleep testing.    Overnight sleep studies may be scheduled on a weekday or weekend. We also perform daytime testing for shift workers on a case-by-case basis.    Once you have booked an appointment to do the sleep study, please contact my office for follow-up visit to discuss results.    On the other hand, if you have any of the following, please consider calling the sleep testing center to RESCHEDULE your sleep study appointment:  If you tested positive for COVID within 10 days of your sleep study appointment.  If you were exposed to somebody who was confirmed for COVID within 10 days of your sleep study appointment and now you are having symptoms of possible COVID  If you have fever>100F or  any acute symptoms that you think will lead to poor sleep during testing (e.g. new or worsening stuffy nose not relieved by steroid nasal spray)  If you have traveled domestically or internationally in the last month and now you are having symptoms of possible COVID    NARCOLEPSY is a chronic neurologic disorder characterized by a decreased ability to regulate sleep-wake cycles. It is characterized by severe and persistent sleepiness in daytime and significantly fragmented disturbed sleep at night. The main symptom of narcolepsy is excessive daytime sleepiness that leads to an irrepressible need to sleep and daytime lapses into sleep. Other commonly seen symptoms include the following:    Cataplexy is sudden and brief loss of muscle control and tone when experiencing strong intense emotion like laughter, surprise, or anger.   Hypnagogic hallucinations and hypnopompic hallucinations are vivid dream-like hallucinations occurring while falling asleep and upon waking up respectively, oftentimes frightening.   Sleep paralysis is a feeling of not being able to talk or move for a brief period either when falling asleep or just after waking up. Touching the person usually causes the sleep paralysis to disappear, although it usually just ends on its own.   Disturbed nighttime sleep  Automatic behaviors involve continuing a familiar, routine, or boring task without being fully aware or later remembering doing it. A person is actually asleep during the activity.   Still, other symptoms include intense fatigue with continual lack of energy, depression, poor concentration and memory, school failure, acting out dreams, and/or sleep eating.    Although it affects 1 in every 2000 people, it is often misdiagnosed as psychiatric disorder (depression, ADHD, etc), and can lead to reduced sleep quality, reduced quality of life, and delays in treatment. It affects males and females equally and usually develops during adolescence with  most people having first symptoms between age 15 to 30.  However, symptoms of narcolepsy can occur at any age. Based on research evidence, narcolepsy is most commonly caused by decreased amount of brain chemicals called Hypocretin (also known as orexin) which, in turn, is due to loss of hypocretin-producing cells in the brain by autoimmune process.     Treatment of narcolepsy involves medications, lifestyle changes, and educating other people.     Medications: One or more medications are usually prescribed to control the symptoms of narcolepsy. The excessive daytime sleepiness is treated with stimulant while cataplexy is treated with anti-depressants.     Lifestyle changes  Try to plan and schedule 2-3 brief naps (~10-15 minutes) in strategic times during the day to control daytime sleepiness and reduce the number of unplanned sleep attacks. Inform teachers or work supervisors about the condition and hopefully, nap accommodations can be fulfilled.   Avoid alcohol and sedative-hypnotic use as these substances may exacerbate sleepiness.   Avoid caffeine in late afternoon or early evening so that sleep at night is not disturbed.   Maintain and follow a strict and regular sleep-wake schedule that ensures adequate sleep.   Practice good sleep hygiene and shift work hygiene, if the latter is applicable  Expose self to bright light in daytime as bright light can be STIMULATING.  Exercise daily in daytime as it can be beneficial and STIMULATING. Increase physical activity often by getting up and walking around to reduce urge to sleep. Avoid boring or repetitive tasks.   Avoid driving vehicle or operating heavy machinery when sleepy until daytime sleepiness is well-treated. A person driving while sleepy is 5 times more likely to have an accident. If you feel sleepy, pull over and take a short power nap (sleep for less than 30 minutes). Otherwise, ask somebody to drive you.    Narcolepsy itself is not deadly but it may be  dangerous if episodes occur during driving, operating machinery, or similar activities. Possible issues and complications with narcolepsy include difficulty with social activities, injuries and accidents if sleep attacks occur during the activity, and side effects of medications used to treat the disorder.    Sleep Testing for narcolepsy: The best and ideal way is to do a baseline overnight sleep study in the sleep  laboratory called polysomnogram (PSG) followed by multiple nap trials in daytime called multiple sleep latency test (MSLT). The PSG ensures a regular night of sleep prior to the MSLT and rules out other sleep disorders such as sleep apnea or periodic limb movement disorder. In some patients who are already on CPAP at home but still have persistently excessive daytime sleepiness, the PSG is done with CPAP on current home settings. On the other hand, the MSLT documents whether the patient falls asleep, how long it takes to fall asleep and how quickly REM sleep follows. The MSLT is done by allowing patient to have 5 nap opportunities with each nap lasting for 20 minutes at 2-hour interval.     Important preparations prior doing PSG-MSLT:    Keep a regular and consistent sleep-wake schedule 2 weeks prior to the study. Avoid having sleep deprivation.   You will be given a sleep diary that you need to fill out 2 weeks prior to testing and bring them with you to give our technologists at the time of testing. If you did not receive a copy of sleep diary, please call my office to ask for a copy.   Some of the medications you are taking may affect the results of MSLT. If you are currently taking any stimulants, please discontinue these medications at least 3-5 days prior testing. If you are taking anti-depressants, please discontinue them 2 weeks prior testing if feasible and you will need approval and tapering off instructions from your psychiatrist or prescribing provider.   For the overnight in-lab sleep study,  please make sure to bring toiletries, a comfy pillow, additional warm blankets, and any nighttime medications (include as-needed inhaler, pain pill, etc) that you may regularly take.   Bring a complete list of your medications with you and please record the last time you took your medications.   Be sure to eat dinner before you arrive as we generally do not provide dinner inside the sleep testing center.   Bring something to do between the nap trials. You will not be able to sleep for 2 hours between naps.   You cannot have caffeine on the day of testing. If you normally take too much caffeine every day, consider get medical advice on how to taper off caffeine weeks prior testing to prevent having symptoms of caffeine withdrawal in the sleep laboratory.   During MSLT, there will be breakfast and lunch served.    You can also go to the following EDUCATION WEBSITES for further information:   American Academy of Sleep Medicine http://sleepeducation.org  National Sleep Foundation: https://sleepfoundation.org  American Sleep Apnea Association: https://www.sleepapnea.org (for patients with sleep apnea)  Narcolepsy Network: https://www.narcolepsynetwork.org (for patients with narcolepsy)  [employer]ake[employer]arcole[employer]: https://www.wakeupnarcolepsy.org (for patients with narcolepsy)  Hypersomnia Foundation: https://www.hypersomniafoundation.org (for patients with idiopathic hypersomnia)  RLS foundation: https://www.rls.org (for patients with restless leg syndrome)    IMPORTANT INFORMATION     Call 911 for medical emergencies.  Our offices are generally open from Monday-Friday, 8 am - 5 pm.   There are no supporting services by either the sleep doctors or their staff on weekends and Holidays, or after 5 PM on weekdays.   If you need to get in touch with me, you may either call my office number or you can use EverConnect.  If a referral for a test, for CPAP, or for another specialist was made, and you have not heard about scheduling this  within a week, please call scheduling at [phone] ([phone]).  If you are unable to make your appointment for clinic or an overnight study, kindly call the office or sleep testing center at least 48 hours in advance to cancel and reschedule.  If you are on CPAP, please bring your device's card and/or the device to each clinic appointment.   In case of problems with PAP machine or mask interface, please contact your DME (Durable Medical Equipment) company first. DME is the company who provides you the machine and/or PAP supplies.       PRESCRIPTIONS     We require 7 days advanced notice for prescription refills. If we do not receive the request in this time, we cannot guarantee that your medication will be refilled in time.    IMPORTANT PHONE NUMBERS     Sleep Medicine Clinic Fax: [phone]  Appointments (for Pediatric Sleep Clinic): [phone] - option 1  Appointments (for Adult Sleep Clinic): [phone] - option 2  Appointments (For Sleep Studies): [phone] - option 3  Behavioral Sleep Medicine: [phone]  Sleep Surgery: [phone]  Nutrition Service: [phone]  Weight management clinics with endocrinology: [phone]  Bariatric Services: [phone] (includes weight loss medications and weight loss surgery)  [facility]: [phone] (offers holistic approaches to weight management)  ENT (Otolaryngology): [phone]  Headache Clinic (Neurology): [phone]  Neurology: [phone]  Psychiatry: [phone]  Pulmonary Function Testing (PFT) Center: [phone]  Pulmonary Medicine: [phone]  Medical Service Company ([employer]): [phone]      OUR SLEEP TESTING LOCATIONS     Our team will contact you to schedule your sleep study, however, you can contact us as follow:  Main Phone Line (scheduling only): [phone]), option 3  Adult and Pediatric Locations  [facility] (6 years and older): [facility] - 4th floor ([address]  "[facility]) After hours line: [phone]  [facility] at [facility] (Main campus: All ages): [facility], 6th floor. After hours line: [phone]   [place] (5 years and older; younger considered on case-by-case basis): [address]; [address], [address]. Scheduling  After hours line: [phone]   [place] (6 years and older): [address]; [address]; [address]   [place] (6 years and older): [address], [address]  After hours line: [phone]   [place] (13 years and older) in [place]: [address], 2nd floor  After hours line: [phone]   [place] (13 year and older): [address]  After hours line: [phone]     Adult Only Locations:   [place] (18 years and older): [address] [place] [address], 2nd floor   [place] (18 years and older): [address] [facility]; 4th floor  After hours line: [phone]   [place] (18 years and older) at [place]: [id number] [place] [address]  After hours line: [phone]     CONTACTING YOUR SLEEP MEDICINE PROVIDER AND SLEEP TEAM      For issues with your machine or mask interface, please call your DME provider first. DME stands for durable medical company. DME is the company who provides you the machine and/or PAP supplies / accessories.   To schedule, cancel, or reschedule SLEEP STUDY APPOINTMENTS, please call the Main Phone Line at [phone] - option 3.   To schedule, cancel, or reschedule CLINIC APPOINTMENTS, you can do it in \"[facility] [facility]\", call [phone] to speak with my  (Halie Pierre), or call the Main Phone Line at [phone] - option 2  For CLINICAL QUESTIONS or MEDICATION REFILLS, please call direct line for Adult Sleep Nurses at [phone].   Lastly, you can also send a message directly to your provider through \"My Chart\", which is the email service through your  Records Account: [email or web address]       Here at " [facility], we wish you a restful sleep!

## 2024-09-18 NOTE — LETTER
September 23, 2024     Patient: Lisa Ramirez   YOB: 2005   Date of Visit: 9/18/2024       To Whom It May Concern:      Lisa Ramirez was seen in my clinic on 9/18/2024 at 10:40 a.m. Please excuse Lisa's absence from school on this day so he can make the appointment.    Besides that, she has severe sleep issues, so please allow her to have 10-15 minutes every 2 hours to ease her Excessive Daytime Sleepiness.     We are still waiting for her MSLT to diagnose her hypersomnia or Narcolepsy and will update it after the study is completed.    If you have any questions or concerns, please don't hesitate to call.  Sincerely,         GABINO Negrete        CC: No Recipients    If you have any questions or concerns, please don't hesitate to call.         Sincerely,         GABINO Negrete        CC: No Recipients

## 2024-09-18 NOTE — LETTER
September 18, 2024     Patient: Lisa Ramirez   YOB: 2005   Date of Visit: 9/18/2024       To Whom It May Concern:    Lisa Ramirez was seen in my clinic on 9/18/2024 at 10:40 am. Please excuse Lisa for his absence from school on this day to make the appointment.    Besides that, she has severe sleep issues, so please allow her to have 10-15 minutes every 2 hours to ease her Excessive Daytime Sleepiness.     If you have any questions or concerns, please don't hesitate to call.         Sincerely,         GEORGE Negrete-CNP        CC: No Recipients

## 2024-09-22 NOTE — TELEPHONE ENCOUNTER
Kishore Myrick,    Can you double-check it? I think the disability form needs to be filed by her PCP, not by specialty. Especially now, the patient only has symptoms, but no MSLT diagnosed.  I don't think it is an appropriate time to file it. Thanks.

## 2024-09-23 ENCOUNTER — TELEPHONE (OUTPATIENT)
Dept: SLEEP MEDICINE | Facility: HOSPITAL | Age: 19
End: 2024-09-23
Payer: COMMERCIAL

## 2024-10-02 ENCOUNTER — CLINICAL SUPPORT (OUTPATIENT)
Dept: SLEEP MEDICINE | Facility: HOSPITAL | Age: 19
End: 2024-10-02
Payer: COMMERCIAL

## 2024-10-02 DIAGNOSIS — G47.10 HYPERSOMNIA: ICD-10-CM

## 2024-10-03 ENCOUNTER — CLINICAL SUPPORT (OUTPATIENT)
Dept: SLEEP MEDICINE | Facility: HOSPITAL | Age: 19
End: 2024-10-03
Payer: COMMERCIAL

## 2024-10-03 VITALS
DIASTOLIC BLOOD PRESSURE: 67 MMHG | BODY MASS INDEX: 36.85 KG/M2 | SYSTOLIC BLOOD PRESSURE: 104 MMHG | HEIGHT: 66 IN | WEIGHT: 229.28 LBS

## 2024-10-03 DIAGNOSIS — G47.411 NARCOLEPSY WITH CATAPLEXY (HHS-HCC): ICD-10-CM

## 2024-10-03 DIAGNOSIS — G47.10 HYPERSOMNIA: ICD-10-CM

## 2024-10-03 LAB
AMPHETAMINES UR QL SCN: NORMAL
BARBITURATES UR QL SCN: NORMAL
BENZODIAZ UR QL SCN: NORMAL
BZE UR QL SCN: NORMAL
CANNABINOIDS UR QL SCN: NORMAL
FENTANYL+NORFENTANYL UR QL SCN: NORMAL
METHADONE UR QL SCN: NORMAL
OPIATES UR QL SCN: NORMAL
OXYCODONE+OXYMORPHONE UR QL SCN: NORMAL
PCP UR QL SCN: NORMAL

## 2024-10-03 PROCEDURE — 80307 DRUG TEST PRSMV CHEM ANLYZR: CPT

## 2024-10-03 NOTE — PROGRESS NOTES
[facility] TECH NOTE:     Patient: Lisa Ramirez   MRN//AGE: [id number]  2005  19 y.o.   Technologist: Uzma Washington   Room: 1   Service Date: 10/2/24        Sleep Testing Location: [facility]: 23    TECHNOLOGIST SLEEP STUDY PROCEDURE NOTE:   This sleep study is being conducted according to the policies and procedures outlined by the AAS accreditation standards.  The sleep study procedure and processes involved during this appointment was explained to the patient/patient’s family, questions were answered. The patient/family verbalized understanding.      The patient is a 19 y.o. year old adult scheduled for a PSG  with montage of:  Diagnostic . he arrived for his appointment.      The study that was ultimately completed was a PSG with montage of:  Diagnostic .    The full study Was completed.  Patient questionnaires completed?: yes     Consents signed? yes    Initial Fall Risk Screening:     Lisa has not fallen in the last 6 months. his did not result in injury. Lisa does not have a fear of falling. He does not need assistance with sitting, standing, or walking. he does not need assistance walking in his home. he does not need assistance in an unfamiliar setting. The patient is notusing an assistive device.     Brief Study observations: Patient is a PSG/MSLT study in following morning.     Deviation to order/protocol and reason: no      If PAP, which was preferred mask/pressure/mode: no      Other:None    After the procedure, the patient/family was informed to ensure followup with ordering clinician for testing results.      Technologist: Uzma Washington

## 2024-10-03 NOTE — PROGRESS NOTES
[facility] TECH NOTE:     Patient: Lisa Ramirez   MRN//AGE: [id number]  2005  19 y.o.   Technologist: Kayode Cunningham   Room: Room 1   Service Date: 10/3/2024        Sleep Testing Location: [facility]: 23    TECHNOLOGIST SLEEP STUDY PROCEDURE NOTE:   This sleep study is being conducted according to the policies and procedures outlined by the AAS accreditation standards.  The sleep study procedure and processes involved during this appointment was explained to the patient/patient’s family, questions were answered. The patient/family verbalized understanding.      The patient is a 19 y.o. year old adult scheduled for aMSLT/MWT with montage of:  MSLT . he arrived for his appointment.      The study that was ultimately completed was aMSLT/MWT with montage of:  MSLT .    The full study Was completed.  Patient questionnaires completed?: yes     Consents signed? yes    Initial Fall Risk Screening:     Lisa has not fallen in the last 6 months. his did not result in injury. Lisa does not have a fear of falling. He does not need assistance with sitting, standing, or walking. he does not need assistance walking in his home. he does not need assistance in an unfamiliar setting. The patient is notusing an assistive device.     Brief Study observations: Patient was here for a MSLT. Patient appeared to fall asleep during four out of five naps.      Deviation to order/protocol and reason: 3rd nap was started late due to issues with C3 wire. Patient knocked wire off during said nap, causing signals to go out.      If PAP, which was preferred mask/pressure/mode: N/A      Other:None    After the procedure, the patient/family was informed to ensure followup with ordering clinician for testing results.      Technologist: Kayode Cunningham

## 2024-10-08 PROCEDURE — RXMED WILLOW AMBULATORY MEDICATION CHARGE

## 2024-10-09 ENCOUNTER — PHARMACY VISIT (OUTPATIENT)
Dept: PHARMACY | Facility: CLINIC | Age: 19
End: 2024-10-09
Payer: COMMERCIAL

## 2024-10-09 NOTE — PROGRESS NOTES
"       [facility] Sleep Medicine  [facility]  [address]  [facility] [id number] [phone]     [facility] Sleep Medicine Clinic  Follow Up Visit Note          Subjective  Patient ID: Lisa Ramirez \"Alf" is a 19 y.o. adult with past medical history significant for Obesity, Anxiety, Depression, and Hypersomnia.     10/16/24: UPDATED: The patient returned to the clinic to review his MSLT and sleep study to treat his hypersomnia. His ESS is 24 today. His MSLT meantime was 6.5 minutes, and sign the Stimulant agreement with him today to start taking Modafinil for his hypersomnia. The urine drug test will be done before start taking the medication. He questioned me why I changed her Wellbutrin from 450 mg to 300 mg. Actually, it was a medical assistance check with him, and he changed it. He threatened and wanted me to promise him to get 450 mg for him. I asked the nurse manager, Tyra, to come to the room when we signed the agreement. Because he still has a uterus, so I have to mention using two kinds of contraception to prevent pregnancy. I explained to him that oral contraception could interact with Modafinil and decrease the efficiency of Modafinil. He also asked why he could not drink alcohol with Stimulants because he will celebrate his 20-year-old birthday soon. And why he could not drink alcohol to celebrate his birthday with his friends. I explained to him it could cause drug alcohol  interactions.  If he did not agree to sign the agreement, then he did not need to sign and will not get medication. The patient verbalized understanding of it and will follow the directions.        9/18/2024: The patient is here and uses a cane to ambulate alone today. His neurologist referred him for comprehensive sleep medicine evaluation due to excessive daytime sleepiness/fatigue. He reports having episodes of falling asleep in the classroom often; he " schedules 3 times of naps for 20 minutes daily, but he is still very sleepy. He reports having one episode when he laughed loudly and fell asleep immediately when he titrated her psychiatric medication. He had an in-lab sleep study, which revealed no JAYDE. Besides that, he reports hallucinations and wakes 4-5 times during the night for unknown reasons. He states that he is very frustrated by his excessive daytime sleepiness. He also mentioned both of her parents having hypersomnia issues but did not get an official diagnosis. His ESS: 23,  VINOD:  17 today.     HPI  Patient had been having these symptoms for the past 10 years. Tried  but no relief.   Patient had PSG in 2024 which showed no OBSTRUCTIVE SLEEP APNEA..        SLEEP STUDY HISTORY: (personally reviewed raw data such as interpretation report, data sheet, hypnogram, and titration table if available and applicable)  4/17/24: PSG: BMI:35.5, RDI 3%: 1.8/hr, RDI 4%: 0.5/hr, Thor: 93%, <88%: 0 minutes  10/2/24: PSG : BMI:37.3, RDI 3%: 2.6/hr, RDI 4%: 0.7/hr, Thor: 92%, <88%: 0 minutes  10/3/24: MSLT: sleep latency: 2.5/6.6/20/0.5/3.0 minutes : mean 6.5 minutes NOREM: 0     SLEEP-WAKE SCHEDULE  Bedtime: 11 PM on weekdays, same on weekends  Subjective sleep latency: 5 minutes  Problems falling asleep: No  Number of awakenings: 5 times per night spontaneously for unknown reasons  Falls back asleep in 5 minutes  Problems staying asleep: Yes  Final wake time: 7 AM on weekdays, 9-10 AM on weekends  Shift work: No  Naps: Yes, 20 minutes, 3 times  Feels rested after a nap: Yes   Average sleep duration (excluding naps): 8 hours     SLEEP ENVIRONMENT  Sleep location: bed  Sleep status: sleeps alone  Room is dark:  Yes  Room is quiet: Yes  Room is cool: Yes  Bed comfort: good     SLEEP HABITS:   Activities before bedtime: read a book  Activities in bed: no  Preferred sleep position: side     SLEEP ROS:  Night symptoms: Denies  Morning symptoms: POSITIVE for unrefreshing  "sleep  Daytime symptoms POSITIVE for excessive daytime sleepiness, fatigue, trouble remembering things in daytime, trouble staying focused in daytime, and irritability in daytime  Hypersomnia / narcolepsy symptoms: POSITIVE for sleep paralysis, sleep-related hallucinations, and cataplexy  RLS symptoms: Patient denies RLS symptoms.  Movements in sleep: Patient denies problematic movements in sleep such as seizures during sleep, frequent leg kicks / jerks while asleep, sleep-related bruxism, and waking up with bedsheets in disarray.  Parasomnia symptoms: Patient denies symptoms of parasomnia such as sleepwalking, sleeptalking, sleep-eating, acting out dreams, and nightmares.      WEIGHT: stable     REVIEW OF SYSTEMS: All other systems have been reviewed and are negative.     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 No significant changes unless documented in the pertinent chart section. Updates made when necessary.               Objective      Vitals:     09/18/24 1040   BP: 104/67   Pulse: 78   Temp: 36.4 °C (97.6 °F)   SpO2: 97%            REVIEW OF SYSTEMS  Review of Systems are all negative        ALLERGIES  Allergies   No Known Allergies           Physical Exam  Constitutional:alert and oriented to time, place, and person  Sinus: - tenderness to palpation  Palate: Narrow Mallampati 3, - Tongue scalloping, - macroglossia  Lungs: Clear to auscultation bilateral, no rales  Heart: Regular rate and rhythm, no murmurs           Assessment/Plan  Lisa Ramirez \"Spenser\" is a 19 y.o. adult who is seen to evaluate for hypersomnia, possible " "obstructive sleep apnea. The pathophysiology of sleep apnea, diagnostic testing (HST vs PSG), treatment options (PAP, oral appliance, surgery, hypoglossal nerve stimulator called Inspire), and supportive management (weight loss, positional therapy, smoking cessation, avoidance of alcohol and sedatives) were discussed with the patient in detail. Risk factors of sleep apnea as well as cardiometabolic and neurocognitive sequelae associated with untreated sleep apnea were also discussed. Lastly, patient was advised to avoid driving vehicle or operating heavy machinery when sleepy.      Lisa Ramirez \"Spenser\" with the following problems:     #IDIOPATHIC HYPERSOMNIA :  - Perform urine toxicology prior to sleep study.  - Scheduled every 2 hours to have 10-15 minutes nap to ease her EDS. -Write a letter to her professors to be mentioned.  - Consolidate night time sleep.  - Start Modafinil 200 mg daily to treat her hypersomnia.     # CHRONIC SLEEP MAINTENANCE INSOMNIA:  -likely due to hypersomnia.  -VINOD: 17  -Sleep hygiene discuss in the clinic.     # DEPRESSION and ANXIETY:   -Lisa Ramirez \"Spenser\"is taking buPROPion XL (Wellbutrin XL) 150 mg and participating in psychotherapy.  -Denies HI/SI     # OBESITY :  -with a BMI of 35.5. Lisa Ramirez \"Spenser\" most recent Bicarbonate was 25        Bicarbonate   Date Value Ref Range Status   02/07/2024 25 21 - 32 mmol/L Final   -Encourage to have regular exercise to manage weight well.     RTC 1 month        All of patient's questions were answered. He verbalizes understanding and agreement with my assessment and plan.  "

## 2024-10-14 ENCOUNTER — LAB (OUTPATIENT)
Dept: LAB | Facility: LAB | Age: 19
End: 2024-10-14
Payer: COMMERCIAL

## 2024-10-14 ENCOUNTER — LAB REQUISITION (OUTPATIENT)
Dept: LAB | Facility: HOSPITAL | Age: 19
End: 2024-10-14

## 2024-10-14 DIAGNOSIS — G47.10 HYPERSOMNIA: ICD-10-CM

## 2024-10-14 DIAGNOSIS — G47.411 NARCOLEPSY WITH CATAPLEXY (HHS-HCC): ICD-10-CM

## 2024-10-14 DIAGNOSIS — D50.9 IRON DEFICIENCY ANEMIA, UNSPECIFIED IRON DEFICIENCY ANEMIA TYPE: ICD-10-CM

## 2024-10-14 DIAGNOSIS — E55.9 VITAMIN D DEFICIENCY: ICD-10-CM

## 2024-10-14 DIAGNOSIS — N92.0 EXCESSIVE AND FREQUENT MENSTRUATION WITH REGULAR CYCLE: ICD-10-CM

## 2024-10-14 DIAGNOSIS — N92.0 EXCESSIVE AND FREQUENT MENSTRUATION WITH REGULAR CYCLE: Primary | ICD-10-CM

## 2024-10-14 LAB
25(OH)D3 SERPL-MCNC: 25 NG/ML (ref 30–100)
ANION GAP SERPL CALC-SCNC: 12 MMOL/L (ref 10–20)
BUN SERPL-MCNC: 4 MG/DL (ref 6–23)
CALCIUM SERPL-MCNC: 9.4 MG/DL (ref 8.6–10.6)
CHLORIDE SERPL-SCNC: 107 MMOL/L (ref 98–107)
CO2 SERPL-SCNC: 27 MMOL/L (ref 21–32)
CREAT SERPL-MCNC: 0.78 MG/DL (ref 0.5–1.3)
EGFRCR SERPLBLD CKD-EPI 2021: >90 ML/MIN/1.73M*2
ERYTHROCYTE [DISTWIDTH] IN BLOOD BY AUTOMATED COUNT: 16.1 % (ref 11.5–14.5)
GLUCOSE SERPL-MCNC: 97 MG/DL (ref 74–99)
HCT VFR BLD AUTO: 38.2 % (ref 36–52)
HGB BLD-MCNC: 11.7 G/DL (ref 12–17.5)
IRON SATN MFR SERPL: 3 % (ref 25–45)
IRON SERPL-MCNC: 13 UG/DL (ref 35–150)
MCH RBC QN AUTO: 22.4 PG (ref 26–34)
MCHC RBC AUTO-ENTMCNC: 30.6 G/DL (ref 32–36)
MCV RBC AUTO: 73 FL (ref 80–100)
NRBC BLD-RTO: 0 /100 WBCS (ref 0–0)
PLATELET # BLD AUTO: 261 X10*3/UL (ref 150–450)
POTASSIUM SERPL-SCNC: 4.2 MMOL/L (ref 3.5–5.3)
RBC # BLD AUTO: 5.22 X10*6/UL (ref 4–5.9)
SODIUM SERPL-SCNC: 142 MMOL/L (ref 136–145)
TIBC SERPL-MCNC: 413 UG/DL (ref 240–445)
TSH SERPL-ACNC: 2.57 MIU/L (ref 0.44–3.98)
UIBC SERPL-MCNC: 400 UG/DL (ref 110–370)
WBC # BLD AUTO: 8.8 X10*3/UL (ref 4.4–11.3)

## 2024-10-14 PROCEDURE — 80048 BASIC METABOLIC PNL TOTAL CA: CPT

## 2024-10-14 PROCEDURE — 83540 ASSAY OF IRON: CPT

## 2024-10-14 PROCEDURE — 85027 COMPLETE CBC AUTOMATED: CPT

## 2024-10-14 PROCEDURE — 84443 ASSAY THYROID STIM HORMONE: CPT

## 2024-10-14 PROCEDURE — 82306 VITAMIN D 25 HYDROXY: CPT

## 2024-10-14 PROCEDURE — 83550 IRON BINDING TEST: CPT

## 2024-10-14 PROCEDURE — 81383 HLA II TYPING 1 ALLELE HR: CPT

## 2024-10-14 PROCEDURE — 36415 COLL VENOUS BLD VENIPUNCTURE: CPT

## 2024-10-15 LAB
HLA RESULTS: NORMAL
HLA TYPING NARCOLEPSY: NEGATIVE

## 2024-10-16 ENCOUNTER — LAB (OUTPATIENT)
Dept: LAB | Facility: LAB | Age: 19
End: 2024-10-16

## 2024-10-16 ENCOUNTER — OFFICE VISIT (OUTPATIENT)
Dept: SLEEP MEDICINE | Facility: HOSPITAL | Age: 19
End: 2024-10-16
Payer: COMMERCIAL

## 2024-10-16 VITALS
HEART RATE: 81 BPM | SYSTOLIC BLOOD PRESSURE: 131 MMHG | TEMPERATURE: 97.3 F | DIASTOLIC BLOOD PRESSURE: 79 MMHG | OXYGEN SATURATION: 97 %

## 2024-10-16 DIAGNOSIS — G47.10 HYPERSOMNIA: ICD-10-CM

## 2024-10-16 DIAGNOSIS — F41.9 ANXIETY: ICD-10-CM

## 2024-10-16 DIAGNOSIS — F32.A DEPRESSION, UNSPECIFIED DEPRESSION TYPE: ICD-10-CM

## 2024-10-16 DIAGNOSIS — E66.9 OBESITY (BMI 30-39.9): ICD-10-CM

## 2024-10-16 DIAGNOSIS — G47.10 HYPERSOMNIA: Primary | ICD-10-CM

## 2024-10-16 PROBLEM — G47.421: Status: RESOLVED | Noted: 2024-09-18 | Resolved: 2024-10-16

## 2024-10-16 PROBLEM — G47.30 SLEEP DISORDER BREATHING: Status: RESOLVED | Noted: 2024-09-18 | Resolved: 2024-10-16

## 2024-10-16 PROCEDURE — 80307 DRUG TEST PRSMV CHEM ANLYZR: CPT

## 2024-10-16 PROCEDURE — 99214 OFFICE O/P EST MOD 30 MIN: CPT

## 2024-10-16 ASSESSMENT — SLEEP AND FATIGUE QUESTIONNAIRES
SITING INACTIVE IN A PUBLIC PLACE LIKE A CLASS ROOM OR A MOVIE THEATER: HIGH CHANCE OF DOZING
WORRIED_DISTRESSED_DUE_TO_SLEEP: VERY MUCH NOTICEABLE
ESS-CHAD TOTAL SCORE: 24
SATISFACTION_WITH_CURRENT_SLEEP_PATTERN: VERY DISSATISFIED
SLEEP_PROBLEM_INTERFERES_DAILY_ACTIVITIES: VERY MUCH NOTICEABLE
HOW LIKELY ARE YOU TO NOD OFF OR FALL ASLEEP WHILE LYING DOWN TO REST IN THE AFTERNOON WHEN CIRCUMSTANCES PERMIT: HIGH CHANCE OF DOZING
HOW LIKELY ARE YOU TO NOD OFF OR FALL ASLEEP WHEN YOU ARE A PASSENGER IN A CAR FOR AN HOUR WITHOUT A BREAK: HIGH CHANCE OF DOZING
DIFFICULTY_STAYING_ASLEEP: SEVERE
HOW LIKELY ARE YOU TO NOD OFF OR FALL ASLEEP WHILE SITTING AND READING: HIGH CHANCE OF DOZING
HOW LIKELY ARE YOU TO NOD OFF OR FALL ASLEEP WHILE SITTING AND TALKING TO SOMEONE: HIGH CHANCE OF DOZING
HOW LIKELY ARE YOU TO NOD OFF OR FALL ASLEEP IN A CAR, WHILE STOPPED FOR A FEW MINUTES IN TRAFFIC: HIGH CHANCE OF DOZING
SLEEP_PROBLEM_NOTICEABLE_TO_OTHERS: VERY MUCH NOTICEABLE
HOW LIKELY ARE YOU TO NOD OFF OR FALL ASLEEP WHILE SITTING QUIETLY AFTER LUNCH WITHOUT ALCOHOL: HIGH CHANCE OF DOZING
HOW LIKELY ARE YOU TO NOD OFF OR FALL ASLEEP WHILE WATCHING TV: HIGH CHANCE OF DOZING

## 2024-10-16 ASSESSMENT — PAIN SCALES - GENERAL: PAINLEVEL_OUTOF10: 0-NO PAIN

## 2024-10-16 NOTE — PATIENT INSTRUCTIONS
[facility] Sleep Medicine  [facility] [place]  [address]  [facility] [id number] [phone]       Thank you for coming to the Sleep Medicine Clinic today! Your sleep medicine provider today was: GABINO Negrete Below is a summary of your treatment plan, patient education, other important information, and our contact numbers.    Dear Spenser,       Your Sleep Provider Today: GABINO Negrete  Your Primary Care Physician: No primary care provider on file.   Your Referring Provider: Asad Kennedy APRN-CNP    Diagnosis: HYPERSOMNIA      Thank you for visiting  Sleep Medicine Clinic !     1. We will collect urine drug text   - Scheduled naps  - Consolidate nighttime sleep.    2. Please CONTINUE practicing the sleep hygiene as discussed in clinic.    3. For medication refill, please call Elen Miguel : [phone], option 2  Or Fax: [phone].       In the event that you are running more than 10 minutes late to your appointment, I will kindly ask you to reschedule. Thanks.      TREATMENT PLAN     Follow-up Appointment:   Follow-up in 1 month    Medications: One or more medications are usually prescribed to control the symptoms of narcolepsy. The excessive daytime sleepiness is treated with stimulant while cataplexy is treated with anti-depressants.     Lifestyle changes  Try to plan and schedule 2-3 brief naps (~10-15 minutes) in strategic times during the day to control daytime sleepiness and reduce the number of unplanned sleep attacks. Inform teachers or work supervisors about the condition and hopefully, nap accommodations can be fulfilled.   Avoid alcohol and sedative-hypnotic use as these substances may exacerbate sleepiness.   Avoid caffeine in late afternoon or early evening so that sleep at night is not disturbed.   Maintain and follow a strict and regular sleep-wake schedule that ensures adequate sleep.   Practice good sleep  hygiene and shift work hygiene, if the latter is applicable  Expose self to bright light in daytime as bright light can be STIMULATING.  Exercise daily in daytime as it can be beneficial and STIMULATING. Increase physical activity often by getting up and walking around to reduce urge to sleep. Avoid boring or repetitive tasks.   Avoid driving vehicle or operating heavy machinery when sleepy until daytime sleepiness is well-treated. A person driving while sleepy is 5 times more likely to have an accident. If you feel sleepy, pull over and take a short power nap (sleep for less than 30 minutes). Otherwise, ask somebody to drive you.    Narcolepsy itself is not deadly but it may be dangerous if episodes occur during driving, operating machinery, or similar activities. Possible issues and complications with narcolepsy include difficulty with social activities, injuries and accidents if sleep attacks occur during the activity, and side effects of medications used to treat the disorder.    You can also go to the following EDUCATION WEBSITES for further information:   American Academy of Sleep Medicine http://sleepeducation.org  National Sleep Foundation: https://sleepfoundation.org  American Sleep Apnea Association: https://www.sleepapnea.org (for patients with sleep apnea)  Narcolepsy Network: https://www.narcolepsynetwork.org (for patients with narcolepsy)  [employer]: https://www.wakeupnarcolepsy.org (for patients with narcolepsy)  Hypersomnia Foundation: https://www.hypersomniafoundation.org (for patients with idiopathic hypersomnia)  RLS foundation: https://www.rls.org (for patients with restless leg syndrome)    IMPORTANT INFORMATION     Call 911 for medical emergencies.  Our offices are generally open from Monday-Friday, 8 am - 5 pm.   There are no supporting services by either the sleep doctors or their staff on weekends and Holidays, or after 5 PM on weekdays.   If you need to get in touch with me, you  may either call my office number or you can use [employer].  If a referral for a test, for CPAP, or for another specialist was made, and you have not heard about scheduling this within a week, please call scheduling at [phone] ([phone]).  If you are unable to make your appointment for clinic or an overnight study, kindly call the office or sleep testing center at least 48 hours in advance to cancel and reschedule.  If you are on CPAP, please bring your device's card and/or the device to each clinic appointment.   In case of problems with PAP machine or mask interface, please contact your NICO (Durable Medical Equipment) company first. DME is the company who provides you the machine and/or PAP supplies.       PRESCRIPTIONS     We require 7 days advanced notice for prescription refills. If we do not receive the request in this time, we cannot guarantee that your medication will be refilled in time.    IMPORTANT PHONE NUMBERS     Sleep Medicine Clinic Fax: [phone]  Appointments (for Pediatric Sleep Clinic): [phone] - option 1  Appointments (for Adult Sleep Clinic): [phone] - option 2  Appointments (For Sleep Studies): [phone] - option 3  Behavioral Sleep Medicine: [phone]  Sleep Surgery: [phone]  Nutrition Service: [phone]  Weight management clinics with endocrinology: [phone]  Bariatric Services: [phone] (includes weight loss medications and weight loss surgery)  [facility]: [phone] (offers holistic approaches to weight management)  ENT (Otolaryngology): [phone]  Headache Clinic (Neurology): [phone]  Neurology: [phone]  Psychiatry: [phone]  Pulmonary Function Testing (PFT) Center: [phone]  Pulmonary Medicine: [phone]  Medical Service Company (DME): [phone]      OUR SLEEP TESTING LOCATIONS     Our team will contact you to schedule your sleep study, however, you can contact us as follow:  Main Phone  "Line (scheduling only): [phone]), option 3  Adult and Pediatric Locations   [employer] [employer] (6 years and older): [facility] - 4th floor ([address]) After hours line: [phone]  [facility] (Main campus: All ages): [facility], 6th floor. After hours line: [phone]   [place] (5 years and older; younger considered on case-by-case basis): [address]; [address], [address]. Scheduling  After hours line: [phone]   [place] (6 years and older): [address]; [address]; [address]   [place] (6 years and older): [address] [facility], [address]  After hours line: [phone]   [place] (13 years and older) in [place]: [address], 2nd floor  After hours line: [phone]   [place] (13 year and older): [address]  After hours line: [phone]     Adult Only Locations:   [place] (18 years and older): [address] [facility], 2nd floor   [place] (18 years and older): [address] [facility]; 4th floor  After hours line: [phone]   [place] (18 years and older) at [place]: [address] [place] [address]  After hours line: [phone]     CONTACTING YOUR SLEEP MEDICINE PROVIDER AND SLEEP TEAM      For issues with your machine or mask interface, please call your DME provider first. DME stands for durable medical company. DME is the company who provides you the machine and/or PAP supplies / accessories.   To schedule, cancel, or reschedule SLEEP STUDY APPOINTMENTS, please call the Main Phone Line at [phone] - option 3.   To schedule, cancel, or reschedule CLINIC APPOINTMENTS, you can do it in \"MyChart\", call [phone] to speak with my  (Halie Pierre), or call the Main Phone Line at [phone] - option 2  For CLINICAL QUESTIONS or MEDICATION REFILLS, please call direct line for Adult Sleep Nurses at [phone].   Lastly, you can also " "send a message directly to your provider through \"My Chart\", which is the email service through your  Records Account: [email or web address]       Here at [facility], we wish you a restful sleep!   "

## 2024-10-17 ENCOUNTER — TELEPHONE (OUTPATIENT)
Dept: SLEEP MEDICINE | Facility: HOSPITAL | Age: 19
End: 2024-10-17

## 2024-10-17 DIAGNOSIS — G47.10 HYPERSOMNIA: ICD-10-CM

## 2024-10-17 DIAGNOSIS — G47.10 HYPERSOMNIA: Primary | ICD-10-CM

## 2024-10-17 PROCEDURE — RXMED WILLOW AMBULATORY MEDICATION CHARGE

## 2024-10-17 RX ORDER — MODAFINIL 200 MG/1
200 TABLET ORAL DAILY
Qty: 30 TABLET | Refills: 0 | Status: SHIPPED | OUTPATIENT
Start: 2024-10-17 | End: 2024-11-16

## 2024-10-17 RX ORDER — MODAFINIL 200 MG/1
200 TABLET ORAL DAILY
Qty: 30 TABLET | Refills: 0 | Status: SHIPPED | OUTPATIENT
Start: 2024-10-17 | End: 2024-10-17 | Stop reason: WASHOUT

## 2024-10-17 NOTE — TELEPHONE ENCOUNTER
Pt requesting refills of medications, changed pharmacy to [facility] for the modafinil and advised pt to call prescribing doctor for Wellbutrin script.

## 2024-10-17 NOTE — PROGRESS NOTES
"-OARRS checked and appropriate  -I personally review the OARRS for Lisa Ramirez \"Spenser\" on 10/17/24. I have considerate the risk of abuse, dependence, addiction, and diversion.   -Last urine drug screen sent on 10/16/24   -Controlled substance use agreement signed and scanned in chart on 10/16/24  -Controlled substances : Modafinil     "

## 2024-10-21 ENCOUNTER — PHARMACY VISIT (OUTPATIENT)
Dept: PHARMACY | Facility: CLINIC | Age: 19
End: 2024-10-21
Payer: COMMERCIAL

## 2024-10-28 ENCOUNTER — TELEPHONE (OUTPATIENT)
Dept: ENDOCRINOLOGY | Facility: CLINIC | Age: 19
End: 2024-10-28
Payer: COMMERCIAL

## 2024-11-04 ENCOUNTER — APPOINTMENT (OUTPATIENT)
Dept: PRIMARY CARE | Facility: CLINIC | Age: 19
End: 2024-11-04
Payer: COMMERCIAL

## 2024-11-04 VITALS
HEART RATE: 90 BPM | BODY MASS INDEX: 33.99 KG/M2 | WEIGHT: 209 LBS | SYSTOLIC BLOOD PRESSURE: 123 MMHG | DIASTOLIC BLOOD PRESSURE: 82 MMHG | OXYGEN SATURATION: 98 %

## 2024-11-04 DIAGNOSIS — J45.909 ASTHMA, ACUTE (HHS-HCC): Primary | ICD-10-CM

## 2024-11-04 PROCEDURE — RXMED WILLOW AMBULATORY MEDICATION CHARGE

## 2024-11-04 PROCEDURE — 99395 PREV VISIT EST AGE 18-39: CPT | Performed by: INTERNAL MEDICINE

## 2024-11-04 RX ORDER — PREDNISONE 20 MG/1
40 TABLET ORAL DAILY
Qty: 10 TABLET | Refills: 0 | Status: SHIPPED | OUTPATIENT
Start: 2024-11-04 | End: 2024-11-11

## 2024-11-04 RX ORDER — FEXOFENADINE HCL 30 MG/5 ML
1 SUSPENSION, ORAL (FINAL DOSE FORM) ORAL DAILY PRN
Qty: 1 EACH | Refills: 0 | Status: SHIPPED | OUTPATIENT
Start: 2024-11-04

## 2024-11-04 ASSESSMENT — ENCOUNTER SYMPTOMS
NECK PAIN: 1
DYSPHORIC MOOD: 1
ARTHRALGIAS: 1
HEADACHES: 1
DECREASED CONCENTRATION: 1
SLEEP DISTURBANCE: 1
SEIZURES: 1
ACTIVITY CHANGE: 0
DIZZINESS: 1
MYALGIAS: 1
BACK PAIN: 1

## 2024-11-04 NOTE — PROGRESS NOTES
INTERNAL MEDICINE - PRIMARY CARE  Established patient visit - post ED follow-up      Lisa Ramirez is 19 y.o. a who presents for Follow-up     Problem list   Asthma with history of recent hospitalization  Seizures   Left foot injury   Unintentional weight loss  Chronic pain     Subjective   HPI   Recent hospitalization for asthma   Already scheduled to follow-up with pulm at [facility] later this month  The patient reports persistent asthma exacerbation following a cold. Symptoms included wheezing and a productive cough with opaque, grimy mucus that later changed to a dry cough. They have been using their inhaler every four hours at minimum. The patient experienced a severe asthma attack while traveling and was discharged from the hospital on the 26th. They have been self-administering albuterol since then, finding it more effective than the hospital treatment. The patient's asthma typically worsens in the fall. They have not returned to their baseline and report that this exacerbation feels different from usual episodes.    Sleep Issues:  The patient has difficulty breathing when lying down and has been sleeping propped up with pillows. They are seeking sleep medicine for these issues.    Pneumomediastinum:  The patient was diagnosed with pneumomediastinum, caused by excessive coughing.    Weight Loss:  The patient reports a loss of appetite since the onset of breathing difficulties, resulting in a 20-pound weight loss. They have been practicing intermittent fasting to control binge eating.    Chest Pain:  The patient still experiences chest pain, especially with deep breaths.    Medication:  The patient finished their prednisone taper yesterday morning and reports feeling more symptomatic since tapering off the medication. They are currently using Onboa and experience a racing heart with albuterol use.    Additional Information:  - The patient's last lung function test was prior to this exacerbation.  - It usually  takes one to two weeks for an exacerbation to resolve for this patient.    Review of Systems   Constitutional:  Negative for activity change.   Musculoskeletal:  Positive for arthralgias, back pain, gait problem, myalgias and neck pain.   Neurological:  Positive for dizziness, seizures and headaches.   Psychiatric/Behavioral:  Positive for decreased concentration, dysphoric mood and sleep disturbance.       No Known Allergies     Objective   Physical Exam     Visit Vitals  /82   Pulse 90   Wt 94.8 kg (209 lb)   SpO2 98%   BMI 33.99 kg/m²   Smoking Status Never   BSA 2.1 m²      Appears well, no distress  Breathing comfortably at rest  +mild wheezing in bilateral upper lung fields   Cranial nerves grossly intact      Medications     Current Outpatient Medications   Medication Instructions    acetaminophen (Tylenol) 325 mg tablet Take 2 tablets (650 mg) by mouth every 4 hours as needed for fever    albuterol 2.5 mg/0.5 mL nebulizer solution USE ONE DOSE EVERY 4 HOURS AS NEEDED FOR SHORTNESS OF BREATH    albuterol 90 mcg/actuation inhaler 2 puffs, inhalation, Every 4 hours PRN    amphetamine-dextroamphetamine (AdderalL) 10 mg tablet 10 mg, oral, 2 times daily    amphetamine-dextroamphetamine XR (Adderall XR) 10 mg 24 hr capsule 20 mg, oral, Every morning, Do not crush or chew.    budesonide-formoteroL (Symbicort) 160-4.5 mcg/actuation inhaler 2 times daily    buPROPion XL (Wellbutrin XL) 150 mg 24 hr tablet 1 TABLET (150 MG) ORALLY DAILY IN THE MORNING - TAKE WITH 300MG PILL = 450MG DAILY    buPROPion XL (Wellbutrin XL) 300 mg 24 hr tablet TAKE 1 TABLET (300 MG) BY MOUTH ONCE DAILY IN THE MORNING.    drospirenone, contraceptive, (Slynd) 4 mg (28) tablet 1 tablet, Daily    ferrous sulfate, 325 mg ferrous sulfate, tablet Daily RT    fluticasone propion-salmeteroL (Advair Diskus) 250-50 mcg/dose diskus inhaler 1 puff, inhalation, 2 times daily RT, Rinse mouth with water after use to reduce aftertaste and incidence  of candidiasis. Do not swallow.    gabapentin (NEURONTIN) 300 mg, oral, Nightly    humidifiers misc 1 Units, miscellaneous, Daily PRN    ibuprofen 600 mg, oral, Every 8 hours PRN    ipratropium (Atrovent) 17 mcg/actuation inhaler Daily RT    lamoTRIgine (LAMICTAL) 100 mg, oral, Daily    modafinil (PROVIGIL) 200 mg, oral, Daily      Assessment/Plan   Assessment/Plan    Asthma Exacerbation & Pneumomediastinum:  - Recent cold followed by respiratory symptoms, including wheezing and productive cough with opaque mucus, later changing to a dry cough.  - Using albuterol inhaler every four hours, with increased usage during a recent asthma attack while traveling.  - Hospitalized and discharged on the 26th, but still not back to baseline.  - Plan: Continue albuterol inhaler every 4 hours as needed. Prescribe another 5-day course of high-dose prednisone. Encourage use of a humidifier at home. Schedule follow-up appointment in 3-4 months or sooner if symptoms worsen.    Weight Loss and Decreased Appetite:  - 20-pound weight loss reported, partly due to intermittent fasting and partly due to decreased appetite since onset of breathing difficulties.  - Plan: Monitor their weight and appetite during follow-up appointments. E     Influenza Vaccination:  - Has not received a flu shot this season.  - Plan: Recommend getting a flu shot once their asthma exacerbation has resolved.        Omayra Buitrago MD MPH  I am the attending physician.

## 2024-11-06 ENCOUNTER — PHARMACY VISIT (OUTPATIENT)
Dept: PHARMACY | Facility: CLINIC | Age: 19
End: 2024-11-06
Payer: COMMERCIAL

## 2024-11-06 ENCOUNTER — OFFICE VISIT (OUTPATIENT)
Dept: NEUROLOGY | Facility: HOSPITAL | Age: 19
End: 2024-11-06
Payer: COMMERCIAL

## 2024-11-06 VITALS
HEART RATE: 109 BPM | DIASTOLIC BLOOD PRESSURE: 81 MMHG | WEIGHT: 209 LBS | SYSTOLIC BLOOD PRESSURE: 137 MMHG | BODY MASS INDEX: 33.59 KG/M2 | HEIGHT: 66 IN

## 2024-11-06 DIAGNOSIS — G47.19 EXCESSIVE DAYTIME SLEEPINESS: ICD-10-CM

## 2024-11-06 DIAGNOSIS — G40.109 FOCAL EPILEPSY (MULTI): ICD-10-CM

## 2024-11-06 DIAGNOSIS — G89.4 CHRONIC PAIN SYNDROME: ICD-10-CM

## 2024-11-06 DIAGNOSIS — R56.9 SEIZURE (MULTI): ICD-10-CM

## 2024-11-06 PROCEDURE — RXMED WILLOW AMBULATORY MEDICATION CHARGE

## 2024-11-06 RX ORDER — GABAPENTIN 300 MG/1
300 CAPSULE ORAL NIGHTLY
Qty: 30 CAPSULE | Refills: 4 | Status: SHIPPED | OUTPATIENT
Start: 2024-11-06

## 2024-11-06 RX ORDER — LAMOTRIGINE 100 MG/1
100 TABLET ORAL DAILY
Qty: 30 TABLET | Refills: 11 | Status: SHIPPED | OUTPATIENT
Start: 2024-11-06 | End: 2025-11-06

## 2024-11-06 NOTE — PROGRESS NOTES
"  [facility] NEUROLOGY OUTPATIENT FOLLOW-UP NOTE  Subjective   Patient Overview  Lisa Ramirez \"Alf" is a 19 y.o. gender-queer patient (preferred pronouns \"he/they;\" assigned female sex at birth) with a history notable for asthma, fibromyalgia, depression, and paroxysmal events who presents to the neurology clinic for followup of excessive daytime sleepiness and paroxysmal events. The patient has carried the diagnosis of idiopathic hypersomnia (?insufficient sleep syndrome) and paroxysmal events.    Previous Neurologic History  Briefly, the patient was first seen in neurology clinic on 1/31/24. At that time, they endorsed both sleepiness (main reason for consult) and paroxysmal events. Regarding sleepiness, this was described as longstanding since starting college-level classes in high school and gradually progressed. Now manifesting as persistent daytime sleepiness with at least once-daily sleep attacks. Often has dreams initially with the LOC, even if they are awoken in several minutes by friends. No other stigmata of narcolepsy and endorsed poor sleep hygiene. Regarding paroxysmal events, patient endorsed episodes of foul smell followed by LOC and diffuse motor actions since childhood.     Overall diagnoses were consistent with Insufficient Sleep Syndrome with REM Intrusions (DDx narcolepsy) and unspecified paroxysmal events. Plan was to obtain MSLT, establish with sleep med (given no sleep attendings at  neuro clinic currently), and continue home  &  daily. Deferred possibility of LP for orexin levels given low-to-moderate narcolepsy concern, making MSLT the appropriate first step.      ANAMNESIS PER PATIENT  A day before, the patient will notice a little \"weird\" feeling. During the event, there will be a burning smell. Then loses consciousness and wakes up on the floor with all muscles hurting. Has had minor injuries. Usually has urinary incontinence and has once or twice awakened " with blood in the mouth, but does not have stimata of tongue biting.     Events have never changed in character, frequency, or duration.     ANAMNESIS PER COLLATERAL  Unable to obtain.     RELATED COMORBIDITIES  Other Spell Types             Staring Spells? - yes. Big problem in middle and high school.             Arm/Leg Jerks? - maybe occasionally?              Lost blocks of time? - no             Events during sleep? - none     Epilepsy Risk Factors             Term birth - Yes             History of  hypoxia - no             History of birth complications - no             History of abnormal development or intellectual disability - no             History of CNS infection (meningitis/encephalitis) - no              History of febrile seizures - no             History of moderate/severe head trauma - several mild concussions             History of stroke/ICH - no             Family history of seizures - no             History of drug / alcohol use - maybe occasional marijuana??             History of neurosurgical procedure - no     ADDITIONAL INFORMATION  Anti-Epileptic Drug (AED) History             Compliant: yes             Current AEDs: LMT 100mg Qday; GBP 300mg QHS             Current medication side effects: none             AEDs previously used and reasons for discontinuation: none     Quality of Life:             Driving: none             Working: Case Student- [employer]             Living with: self - studio apartment.       Interval History  In the interim, the patient states that not much has changed. Saw Sleep Med (personally reviewed notes); underwent MSLT (personally reviewed & interpreted). MSL 6.5m with total 0 SOREMPs and thus does not meet criteria for narcolepsy. This sleep latency is however short and along with extremely high ESS is compatible with a diagnosis of idiopathic hypersomnia. Patient was trialed on modafinil but didn't like it because of palpitations. Tried to self-increase  Wellbutrin 300 -> 450 but caused recurrence of paroxysmal events. Last event was in early August with the aforementioned Wellbutrin trial. These were typical for her normal events. Has also had some episodes of involuntary LUE movements, mostly stiffness and jerking. Now feels that their LUE still is weird, mostly on the triceps region.    States that some past events have caused LUE stiff prior to the evolution of the events. Additionally endorses some memory issues.    Patient is comfortable with current AED regimen.      ROS:  A comprehensive review-of-systems was obtained and negative unless noted above in the HPI.    Past Medical History  Past Medical History:   Diagnosis Date    Asthma     Exercise induced bronchospasm ([facility])     Asthma, exercise induced    Personal history of diseases of the blood and blood-forming organs and certain disorders involving the immune mechanism     History of iron deficiency anemia    Personal history of other mental and behavioral disorders     History of depression    Personal history of other mental and behavioral disorders     History of OCD (obsessive compulsive disorder)    Personal history of other specified conditions     History of pseudoseizure     Surgical History  No past surgical history on file.  Social History  Social History     Tobacco Use    Smoking status: Never    Smokeless tobacco: Never   Vaping Use    Vaping status: Never Used   Substance Use Topics    Alcohol use: Not Currently     Comment: ocassion    Drug use: Yes     Types: Marijuana     Comment: rarley     Allergies  Patient has no known allergies.      =========  Medications    Current Outpatient Medications:     acetaminophen (Tylenol) 325 mg tablet, Take 2 tablets (650 mg) by mouth every 4 hours as needed for fever, Disp: 30 tablet, Rfl: 0    albuterol 2.5 mg/0.5 mL nebulizer solution, USE ONE DOSE EVERY 4 HOURS AS NEEDED FOR SHORTNESS OF BREATH, Disp: 180 mL, Rfl: 1    albuterol 90 mcg/actuation  inhaler, Inhale 2 puffs every 4 hours if needed., Disp: 8.5 g, Rfl: 3    budesonide-formoteroL (Symbicort) 160-4.5 mcg/actuation inhaler, Inhale twice a day., Disp: , Rfl:     buPROPion XL (Wellbutrin XL) 150 mg 24 hr tablet, 1 TABLET (150 MG) ORALLY DAILY IN THE MORNING - TAKE WITH 300MG PILL = 450MG DAILY, Disp: 90 tablet, Rfl: 1    buPROPion XL (Wellbutrin XL) 300 mg 24 hr tablet, TAKE 1 TABLET (300 MG) BY MOUTH ONCE DAILY IN THE MORNING., Disp: 90 tablet, Rfl: 3    drospirenone, contraceptive, (Slynd) 4 mg (28) tablet, Take 1 tablet by mouth once daily., Disp: , Rfl:     ferrous sulfate, 325 mg ferrous sulfate, tablet, Take by mouth once daily., Disp: , Rfl:     fluticasone propion-salmeteroL (Advair Diskus) 250-50 mcg/dose diskus inhaler, Inhale 1 puff 2 times a day. Rinse mouth with water after use to reduce aftertaste and incidence of candidiasis. Do not swallow., Disp: 60 each, Rfl: 11    gabapentin (Neurontin) 300 mg capsule, Take 1 capsule (300 mg) by mouth once daily at bedtime., Disp: 30 capsule, Rfl: 4    humidifiers misc, 1 Units once daily as needed (for shortness of breath, wheezing)., Disp: 1 each, Rfl: 0    ibuprofen 600 mg tablet, Take 1 tablet (600 mg) by mouth every 8 hours if needed for mild pain (1 - 3) or fever (temp greater than 38.0 C)., Disp: 30 tablet, Rfl: 0    ipratropium (Atrovent) 17 mcg/actuation inhaler, Inhale once daily., Disp: , Rfl:     lamoTRIgine (LaMICtal) 100 mg tablet, Take 1 tablet (100 mg) by mouth once daily., Disp: 30 tablet, Rfl: 11    methylPREDNISolone (Medrol Dospak) 4 mg tablets, Take as directed (Patient not taking: Reported on 11/4/2024), Disp: 21 tablet, Rfl: 0    modafinil (Provigil) 200 mg tablet, Take 1 tablet (200 mg) by mouth once daily. (Patient not taking: Reported on 11/4/2024), Disp: 30 tablet, Rfl: 0    montelukast (Singulair) 10 mg tablet, Take 1 tablet (10 mg) by mouth once daily at bedtime. (Patient not taking: Reported on 11/4/2024), Disp: , Rfl:  "    norethindrone (Winter) 0.35 mg tablet, Take 1 tablet (0.35 mg) by mouth once daily. (Patient not taking: Reported on 11/4/2024), Disp: 28 tablet, Rfl: 12    orphenadrine (Norflex) 100 mg 12 hr tablet, Take 1 tablet (100 mg) by mouth 2 times a day as needed for muscle spasms for up to 5 days. Do not crush, chew, or split. (Patient not taking: Reported on 11/4/2024), Disp: 10 tablet, Rfl: 0    pantoprazole (ProtoNix) 40 mg EC tablet, Take 1 tablet (40 mg) by mouth once daily in the morning. (Patient not taking: Reported on 11/4/2024), Disp: 30 tablet, Rfl: 3    predniSONE (Deltasone) 20 mg tablet, Take 2 tablets (40 mg) by mouth once daily for 5 days., Disp: 10 tablet, Rfl: 0     =========      Objective   Vital Signs  /81   Pulse 109   Ht 1.67 m (5' 5.75\")   Wt 94.8 kg (209 lb)   BMI 33.99 kg/m²     Physical Exam  GENERAL: sitting up in chair comfortably; well-appearing and in NAD. Appears stated age.  PSYCHIATRIC/MSE: conversational; full euthymic affect; logical thought process; cognition intact.  NEUROLOGICAL:     Cognitive Status: A&Ox4. Language expression, comprehension, and repetition intact. Remains focused during interview.     Cranial Nerves: VF full to confrontation; PERRL (4->2) b/l; EOM full-range with smooth pursuits and no gaze-evoked nystagmus; saccades accurate, conjugate, and rapid; intact vergence; face sensation & strength symmetric; tongue midline; shoulders strong.     Motor: Normal bulk and tone without tremor or pronator drift. There are no adventitious movements noted. No spasticity.     Strength: All extremities are 5/5, both proximally and distally.     Reflexes: 2-3+ to all modalities. Torres's absent.     Sensory: intact and symmetric to light touch     Coordination: normokinetic w/o ataxia or action tremor on FtN and HtS     Rapid Movements: deferred     Gait: ambulates with crutches but able to move multiple steps without them touching the ground (improved from last " visit). Gait is stable with normal step and stride length.     Add'l Maneuvers: deferred    Recent/Relevant Labs:  Hemoglobin   Date/Time Value Ref Range Status   10/14/2024 11:27 AM 11.7 (L) 12.0 - 17.5 g/dL Final     WBC   Date/Time Value Ref Range Status   10/14/2024 11:27 AM 8.8 4.4 - 11.3 x10*3/uL Final     Sodium   Date/Time Value Ref Range Status   10/14/2024 11:27  136 - 145 mmol/L Final     Potassium   Date/Time Value Ref Range Status   10/14/2024 11:27 AM 4.2 3.5 - 5.3 mmol/L Final     Chloride   Date/Time Value Ref Range Status   10/14/2024 11:27  98 - 107 mmol/L Final     Urea Nitrogen   Date/Time Value Ref Range Status   10/14/2024 11:27 AM 4 (L) 6 - 23 mg/dL Final     Creatinine   Date/Time Value Ref Range Status   10/14/2024 11:27 AM 0.78 0.50 - 1.30 mg/dL Final     Phosphorus   Date/Time Value Ref Range Status   10/06/2023 06:00 AM 4.1 2.5 - 4.9 mg/dL Final     Comment:     The performance characteristics of phosphorus testing in heparinized plasma have been validated by the individual  laboratory site where testing is performed. Testing on heparinized plasma is not approved by the FDA; however, such approval is not necessary.     Calcium   Date/Time Value Ref Range Status   10/14/2024 11:27 AM 9.4 8.6 - 10.6 mg/dL Final     Total Protein   Date/Time Value Ref Range Status   02/07/2024 08:29 PM 6.7 6.4 - 8.2 g/dL Final     Albumin   Date/Time Value Ref Range Status   02/07/2024 08:29 PM 4.4 3.4 - 5.0 g/dL Final     Bilirubin, Total   Date/Time Value Ref Range Status   02/07/2024 08:29 PM 0.5 0.0 - 1.2 mg/dL Final     AST   Date/Time Value Ref Range Status   02/07/2024 08:29 PM 10 9 - 39 U/L Final     ALT   Date/Time Value Ref Range Status   02/07/2024 08:29 PM 10 7 - 52 U/L Final     Comment:     Patients treated with Sulfasalazine may generate falsely decreased results for ALT.     Alkaline Phosphatase   Date/Time Value Ref Range Status   02/07/2024 08:29 PM 59 33 - 120 U/L Final      Total CK   Date/Time Value Ref Range Status   02/06/2023 05:23 PM 79 0 - 215 U/L Final      LDL   Date/Time Value Ref Range Status   09/01/2022 11:14 AM 49 0 - 109 mg/dL Final     Comment:     .                           NEAR      BORD      AGE      DESIRABLE  OPTIMAL    HIGH     HIGH     VERY HIGH     0-19 Y     0 - 109     ---    110-129   >/= 130     ----    20-24 Y     0 - 119     ---    120-159   >/= 160     ----      >24 Y     0 -  99   100-129  130-159   160-189     >/=190  .       Triglycerides   Date/Time Value Ref Range Status   09/01/2022 11:14 AM 67 0 - 149 mg/dL Final     Comment:     .      AGE      DESIRABLE   BORDERLINE HIGH   HIGH     VERY HIGH   0 D-90 D    19 - 174         ----         ----        ----  91 D- 9 Y     0 -  74        75 -  99     >/= 100      ----    10-19 Y     0 -  89        90 - 129     >/= 130      ----    20-24 Y     0 - 114       115 - 149     >/= 150      ----         >24 Y     0 - 149       150 - 199    200- 499    >/= 500  .   Venipuncture immediately after or during the    administration of Metamizole may lead to falsely   low results. Testing should be performed immediately   prior to Metamizole dosing.       Hemoglobin A1C   Date/Time Value Ref Range Status   02/06/2023 05:23 PM 5.7 (A) % Final     Comment:          Diagnosis of Diabetes-Adults   Non-Diabetic: < or = 5.6%   Increased risk for developing diabetes: 5.7-6.4%   Diagnostic of diabetes: > or = 6.5%  .       Monitoring of Diabetes                Age (y)     Therapeutic Goal (%)   Adults:          >18           <7.0   Pediatrics:    13-18           <7.5                   7-12           <8.0                   0- 6            7.5-8.5   American Diabetes Association. Diabetes Care 33(S1), Jan 2010.     09/01/2022 11:14 AM 5.8 (A) % Final     Comment:          Diagnosis of Diabetes-Adults   Non-Diabetic: < or = 5.6%   Increased risk for developing diabetes: 5.7-6.4%   Diagnostic of diabetes: > or = 6.5%  .        Monitoring of Diabetes                Age (y)     Therapeutic Goal (%)   Adults:          >18           <7.0   Pediatrics:    13-18           <7.5                   7-12           <8.0                   0- 6            7.5-8.5   American Diabetes Association. Diabetes Care 33(S1), Jan 2010.       Estimated Average Glucose   Date/Time Value Ref Range Status   02/06/2023 05:23  MG/DL Final     Troponin I   Date/Time Value Ref Range Status   09/28/2023 01:04 PM <3 0 - 34 ng/L Final     Comment:     .  Less than 99th percentile of normal range cutoff-  Female and children under 18 years old <35 ng/L; Male <54 ng/L: Negative  Repeat testing should be performed if clinically indicated.   .  Female and children under 18 years old  ng/L; Male  ng/L:  Consistent with possible cardiac damage and possible increased clinical   risk. Serial measurements may help to assess extent of myocardial damage.   .  >120 ng/L: Consistent with cardiac damage, increased clinical risk and  myocardial infarction. Serial measurements may help assess extent of   myocardial damage.   .   NOTE: Children less than 1 year old may have higher baseline troponin   levels and results should be interpreted in conjunction with the overall   clinical context.  .  NOTE: Troponin I testing is performed using a different   testing methodology at [facility] than at other   system [facility]. Direct result comparisons should only   be made within the same method.       Thyroid Stimulating Hormone   Date/Time Value Ref Range Status   10/14/2024 11:27 AM 2.57 0.44 - 3.98 mIU/L Final     TSH   Date/Time Value Ref Range Status   02/06/2023 05:23 PM 4.48 (H) 0.44 - 3.98 mIU/L Final     Comment:      TSH testing is performed using different testing    methodology at [facility] than at other    system [facility]. Direct result comparisons should    only be made within the same method.     09/01/2022 11:14 AM 1.85 0.44 - 3.98  "mIU/L Final     Comment:      TSH testing is performed using different testing    methodology at [facility] than at other    [facility]. Direct result comparisons should    only be made within the same method.       Free T4   Date/Time Value Ref Range Status   02/06/2023 05:23 PM 1.34 0.78 - 1.48 ng/dL Final     Comment:      Thyroxine Free testing is performed using different testing    methodology at [facility] than at other    [facility]. Direct result comparisons should    only be made within the same method.       Vitamin D, 25-Hydroxy, Total   Date/Time Value Ref Range Status   10/14/2024 11:27 AM 25 (L) 30 - 100 ng/mL Final       Recent/Relevant Imaging:  No MRI head results found for the past 12 months   No CT head results found for the past 12 months     Other Recent/Relevant Studies:  No EMG results found for the past 12 months   No EEG results found for the past 12 months   No echocardiogram results found for the past 12 months  No results found for this or any previous visit (from the past 4464 hours).       =========  Assessment/Plan   Assessment:  Lisa Ramirez \"Spenser\" is a 19 y.o. gender-queer patient (preferred pronouns \"he/they;\" assigned female sex at birth) with a history notable for asthma, fibromyalgia, depression, and paroxysmal events who presents to the neurology clinic for followup of excessive daytime sleepiness and paroxysmal events. The patient has carried the diagnosis of idiopathic hypersomnia (?insufficient sleep syndrome) and paroxysmal events. Sleepiness has remained but is now being addressed in concert with our sleep med colleagues - finally able to obtain MSLT which is not suggestive of narcolepsy but supports the presupposition of idiopathic hypersomnia. Paroxysmal event breakthrough occurred (expectedly) with self-increase of Wellbutrin dose but otherwise controlled. Discussed with patient that it remains unknown the extent to which " these are epileptic in nature vs a component of dissociation. With adequate control and the MCAT upcoming, will defer EMU admission at this time, but do suggest this as a future direction for workup if we ever wish to wean AEDs and/or confirm a diagnosis of focal epilepsy.     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  - send-in any videos of paroxysmal events to allow semiotic analysis  - continue followup with sleep medicine. Continue to employ sleep hygiene modifications as advised in clinic.  - defer LP given no evidence for narcolepsy on MSLT.      The patient was seen and the case discussed with attending neurologist Dr. Bradne Mullen, who agrees with my management plan. Medical decision making was MODERATE complexity.      Maurice Aleman MD  Neurology Resident - PGY-4    [facility] [employer]

## 2024-11-06 NOTE — PATIENT INSTRUCTIONS
Dear Spenser,    Thank you for letting me take part in your care! You were seen in the neurology resident clinic today for your focal epilepsy and idiopathic hypersomnia. Ultimately, we planned to continue your Lamictal and Gabapentin doses for now.    Return to clinic in 6 months.    To make an appointment in my clinic or leave a message for me with the neurology office, please call [phone]. If you need to contact me directly, you can reach out via [employer] Message or call the hospital  at [phone] and ask them to page me at [phone].    Thank you,    Maurice Aleman MD (PGY-4)  [facility] [employer]

## 2024-11-07 ENCOUNTER — OFFICE VISIT (OUTPATIENT)
Dept: SLEEP MEDICINE | Facility: HOSPITAL | Age: 19
End: 2024-11-07
Payer: COMMERCIAL

## 2024-11-07 ENCOUNTER — PHARMACY VISIT (OUTPATIENT)
Dept: PHARMACY | Facility: CLINIC | Age: 19
End: 2024-11-07
Payer: COMMERCIAL

## 2024-11-07 VITALS
TEMPERATURE: 97.3 F | HEART RATE: 102 BPM | SYSTOLIC BLOOD PRESSURE: 113 MMHG | BODY MASS INDEX: 33.83 KG/M2 | WEIGHT: 208 LBS | DIASTOLIC BLOOD PRESSURE: 77 MMHG | OXYGEN SATURATION: 96 %

## 2024-11-07 DIAGNOSIS — G47.11 IDIOPATHIC HYPERSOMNIA: ICD-10-CM

## 2024-11-07 DIAGNOSIS — E66.9 OBESITY (BMI 30-39.9): ICD-10-CM

## 2024-11-07 DIAGNOSIS — F32.A DEPRESSION, UNSPECIFIED DEPRESSION TYPE: ICD-10-CM

## 2024-11-07 DIAGNOSIS — G47.411 NARCOLEPSY WITH CATAPLEXY (HHS-HCC): Primary | ICD-10-CM

## 2024-11-07 PROCEDURE — 99214 OFFICE O/P EST MOD 30 MIN: CPT | Performed by: STUDENT IN AN ORGANIZED HEALTH CARE EDUCATION/TRAINING PROGRAM

## 2024-11-07 PROCEDURE — RXMED WILLOW AMBULATORY MEDICATION CHARGE

## 2024-11-07 PROCEDURE — 1036F TOBACCO NON-USER: CPT | Performed by: STUDENT IN AN ORGANIZED HEALTH CARE EDUCATION/TRAINING PROGRAM

## 2024-11-07 RX ORDER — DEXTROAMPHETAMINE SACCHARATE, AMPHETAMINE ASPARTATE MONOHYDRATE, DEXTROAMPHETAMINE SULFATE AND AMPHETAMINE SULFATE 5; 5; 5; 5 MG/1; MG/1; MG/1; MG/1
20 CAPSULE, EXTENDED RELEASE ORAL EVERY MORNING
Qty: 30 CAPSULE | Refills: 0 | Status: SHIPPED | OUTPATIENT
Start: 2024-11-07 | End: 2024-11-08 | Stop reason: RX

## 2024-11-07 RX ORDER — DEXTROAMPHETAMINE SACCHARATE, AMPHETAMINE ASPARTATE, DEXTROAMPHETAMINE SULFATE AND AMPHETAMINE SULFATE 2.5; 2.5; 2.5; 2.5 MG/1; MG/1; MG/1; MG/1
10 TABLET ORAL 2 TIMES DAILY
Qty: 60 TABLET | Refills: 0 | Status: SHIPPED | OUTPATIENT
Start: 2024-11-07 | End: 2024-12-07

## 2024-11-07 ASSESSMENT — ENCOUNTER SYMPTOMS
NEUROLOGICAL NEGATIVE: 1
PSYCHIATRIC NEGATIVE: 1
CARDIOVASCULAR NEGATIVE: 1
CONSTITUTIONAL NEGATIVE: 1
RESPIRATORY NEGATIVE: 1

## 2024-11-07 ASSESSMENT — PAIN SCALES - GENERAL: PAINLEVEL_OUTOF10: 0-NO PAIN

## 2024-11-08 ENCOUNTER — PATIENT MESSAGE (OUTPATIENT)
Dept: SLEEP MEDICINE | Facility: HOSPITAL | Age: 19
End: 2024-11-08
Payer: COMMERCIAL

## 2024-11-08 DIAGNOSIS — G47.11 IDIOPATHIC HYPERSOMNIA: ICD-10-CM

## 2024-11-08 PROCEDURE — RXMED WILLOW AMBULATORY MEDICATION CHARGE

## 2024-11-08 RX ORDER — DEXTROAMPHETAMINE SACCHARATE, AMPHETAMINE ASPARTATE MONOHYDRATE, DEXTROAMPHETAMINE SULFATE AND AMPHETAMINE SULFATE 2.5; 2.5; 2.5; 2.5 MG/1; MG/1; MG/1; MG/1
20 CAPSULE, EXTENDED RELEASE ORAL EVERY MORNING
Qty: 60 CAPSULE | Refills: 0 | Status: SHIPPED | OUTPATIENT
Start: 2024-11-08 | End: 2024-12-09

## 2024-11-08 NOTE — ASSESSMENT & PLAN NOTE
Stable on Wellbutrin 450 mg daily, however, with her epilepsy history, she needs some dosage adjustment.  Neuro and Psych are working on this.    No SI/HI

## 2024-11-08 NOTE — ASSESSMENT & PLAN NOTE
MSLT showed MSL of 6.5 min with no SOREM.  The PSG the night before demonstrated 500 minutes of sleep time  She is extremely sleepy -> reported 24/24 on ESS.    She tried Modafinil 200 mg and didn't like how it made her feel.  She felt her heart racing really fast but wasn't sure if she was having asthma attack as well.  She stopped after 2 days and does not want to try it again.    WE discussed about other options such as Adderall, Ritalin and Xywav.  She doesn't want to take Xywav as she is afraid of the worsening of anxiety (already pretty anxious because she is taking MCAT soon).  She will keep me posted in a week via Andela message    We agreed to try Adderall first, we will start with 20 mg XR in the morning and 10 mg IR TID PRN.  She is instructed to get herself started slowly and see how well she can tolerate the medication

## 2024-11-08 NOTE — ASSESSMENT & PLAN NOTE
BMI Readings from Last 1 Encounters:   11/07/24 33.83 kg/m²     - encouraged healthy weight loss via diet and exercise

## 2024-11-08 NOTE — PROGRESS NOTES
Patient: Lisa Ramirez    [id number]  : 2005 -- AGE 19 y.o.    Provider: Ezekiel Aguilera MD     Location [facility]   Service Date: 2024              [facility]  Followup Visit Note    ASSESSMENT/PLAN     Mr. Ramirez is a 19 y.o. adult and he returns in followup to the [facility] for the problems listed below on 24     Problem List, Orders, Assessment, Recommendations:  Problem List Items Addressed This Visit             ICD-10-CM    Depression F32.A     Stable on Wellbutrin 450 mg daily, however, with her epilepsy history, she needs some dosage adjustment.  Neuro and Psych are working on this.    No SI/HI         Obesity (BMI 30-39.9) E66.9     BMI Readings from Last 1 Encounters:   24 33.83 kg/m²     - encouraged healthy weight loss via diet and exercise           Idiopathic hypersomnia G47.11     MSLT showed MSL of 6.5 min with no SOREM.  The PSG the night before demonstrated 500 minutes of sleep time  She is extremely sleepy -> reported  on ESS.    She tried Modafinil 200 mg and didn't like how it made her feel.  She felt her heart racing really fast but wasn't sure if she was having asthma attack as well.  She stopped after 2 days and does not want to try it again.    WE discussed about other options such as Adderall, Ritalin and Xywav.  She doesn't want to take Xywav as she is afraid of the worsening of anxiety (already pretty anxious because she is taking [employer] soon).  She will keep me posted in a week via [employer] message    We agreed to try Adderall first, we will start with 20 mg XR in the morning and 10 mg IR TID PRN.  She is instructed to get herself started slowly and see how well she can tolerate the medication          Other Visit Diagnoses         Codes    Narcolepsy with cataplexy ([facility])    -  Primary G47.411    Relevant Medications    amphetamine-dextroamphetamine XR (Adderall  "XR) 20 mg 24 hr capsule    amphetamine-dextroamphetamine (AdderalL) 10 mg tablet    Other Relevant Orders    Follow Up In Adult Sleep Medicine            Disposition    Return to clinic in 1.5 months         HISTORY OF PRESENT ILLNESS     HISTORY OF PRESENT ILLNESS   Lisa Ramirez \"Alf" is a 19 y.o. adult with h/o Idiopathic Hypersomnia and Depression who presents to a [facility] for followup.     Current History    On today's visit, the patient reports that she was started on Modafinil but didn't find it helpful.  Instead, she was bothered by the side-effect of palpitation.  She is already worried about her family history of HTN and doesn't want to take that anymore.  However, when we discussed about classic stimulants that have similar side effect profile, she was more willing to give that a try.  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  She walks with two canes which she states that they help to alleviate her ankle pains. She reports that she has chronic pain issue for a long time.  She had a lot of \"sprang and breaks\" but no one seemed to be able to identify what's really going on with her.  No major fractures of long bones but plenty of \"foot breaks\" and dislocations of joints.  She reports constant joint pain throughout her body.  She has no problem falling asleep, but during the day, she has a lot of difficulties staying awake.    RLS Followup:   none.    Daytime Symptoms    Patient reports DAYTIME SYMPTOMS: sleep inertia, irritability during the day, difficulty with memory or concentration during the day, excessively sleepy during the day, and irresistible urge to fall asleep    Naps: frequent dozing  Fatigue: symptoms bothersome, but easily able to carry out all usual work/school/family activities    REVIEW OF SYSTEMS     REVIEW OF SYSTEMS  Review of Systems   Constitutional: Negative.    HENT: Negative.     Respiratory: Negative.     Cardiovascular: " Negative.    Genitourinary: Negative.    Skin: Negative.    Neurological: Negative.    Psychiatric/Behavioral: Negative.       ALLERGIES AND MEDICATIONS     ALLERGIES  No Known Allergies    MEDICATIONS: He has a current medication list which includes the following prescription(s): acetaminophen - Take 2 tablets (650 mg) by mouth every 4 hours as needed for fever, albuterol - USE ONE DOSE EVERY 4 HOURS AS NEEDED FOR SHORTNESS OF BREATH, albuterol - Inhale 2 puffs every 4 hours if needed, amphetamine-dextroamphetamine - Take 1 tablet (10 mg) by mouth 2 times a day, amphetamine-dextroamphetamine xr - Take 1 capsule (20 mg) by mouth once daily in the morning. Do not crush or chew, budesonide-formoterol - Inhale twice a day, bupropion xl - 1 TABLET (150 MG) ORALLY DAILY IN THE MORNING - TAKE WITH 300MG PILL = 450MG DAILY, bupropion xl - TAKE 1 TABLET (300 MG) BY MOUTH ONCE DAILY IN THE MORNING, slynd - Take 1 tablet by mouth once daily, ferrous sulfate (325 mg ferrous sulfate) - Take by mouth once daily, fluticasone propion-salmeterol - Inhale 1 puff 2 times a day. Rinse mouth with water after use to reduce aftertaste and incidence of candidiasis. Do not swallow, gabapentin - Take 1 capsule (300 mg) by mouth once daily at bedtime, humidifiers - 1 Units once daily as needed (for shortness of breath, wheezing), ibuprofen - Take 1 tablet (600 mg) by mouth every 8 hours if needed for mild pain (1 - 3) or fever (temp greater than 38.0 C), ipratropium - Inhale once daily, lamotrigine - Take 1 tablet (100 mg) by mouth once daily, modafinil - Take 1 tablet (200 mg) by mouth once daily (Patient not taking: Reported on 11/4/2024), prednisone - Take 2 tablets (40 mg) by mouth once daily for 5 days, [DISCONTINUED] memantine - Take 1 tablet (5 mg) by mouth once daily, and [DISCONTINUED] methocarbamol - Take 1 tablet by mouth three times a day.    PAST MEDICAL HISTORY : He  has a past medical history of Asthma, Exercise induced  bronchospasm ([facility]), Personal history of diseases of the blood and blood-forming organs and certain disorders involving the immune mechanism, Personal history of other mental and behavioral disorders, Personal history of other mental and behavioral disorders, and Personal history of other specified conditions.    PAST SURGICAL HISTORY: He  has no past surgical history on file.     FAMILY HISTORY: No changes since previous visit. Otherwise non-contributory as charted.     SOCIAL HISTORY  He  reports that he has never smoked. He has never used smokeless tobacco. He reports current alcohol use. He reports current drug use. Drug: Marijuana.       PHYSICAL EXAM     VITAL SIGNS: /77   Pulse 102   Temp 36.3 °C (97.3 °F)   Wt 94.3 kg (208 lb)   SpO2 96%   BMI 33.83 kg/m²      PREVIOUS WEIGHTS:  Wt Readings from Last 3 Encounters:   11/07/24 94.3 kg (208 lb)   11/06/24 94.8 kg (209 lb)   11/04/24 94.8 kg (209 lb)         RESULTS/DATA     Bicarbonate (mmol/L)   Date Value   10/14/2024 27   02/07/2024 25   10/06/2023 24     Iron (ug/dL)   Date Value   10/14/2024 13 (L)   09/01/2022 15 (L)     % Saturation (%)   Date Value   10/14/2024 3 (L)     Iron Saturation (%)   Date Value   09/01/2022 3 (L)     TIBC (ug/dL)   Date Value   10/14/2024 413   09/01/2022 454 (H)     Ferritin (ug/L)   Date Value   09/01/2022 16

## 2024-11-09 ENCOUNTER — PHARMACY VISIT (OUTPATIENT)
Dept: PHARMACY | Facility: CLINIC | Age: 19
End: 2024-11-09
Payer: COMMERCIAL

## 2024-11-18 ENCOUNTER — APPOINTMENT (OUTPATIENT)
Dept: SLEEP MEDICINE | Facility: HOSPITAL | Age: 19
End: 2024-11-18
Payer: COMMERCIAL

## 2024-11-24 ENCOUNTER — PHARMACY VISIT (OUTPATIENT)
Dept: PHARMACY | Facility: CLINIC | Age: 19
End: 2024-11-24
Payer: COMMERCIAL

## 2024-11-24 PROCEDURE — RXMED WILLOW AMBULATORY MEDICATION CHARGE

## 2024-12-01 PROCEDURE — RXMED WILLOW AMBULATORY MEDICATION CHARGE

## 2024-12-03 ENCOUNTER — PHARMACY VISIT (OUTPATIENT)
Dept: PHARMACY | Facility: CLINIC | Age: 19
End: 2024-12-03
Payer: COMMERCIAL

## 2024-12-03 PROCEDURE — RXMED WILLOW AMBULATORY MEDICATION CHARGE

## 2024-12-03 RX ORDER — SERTRALINE HYDROCHLORIDE 50 MG/1
TABLET, FILM COATED ORAL
Qty: 90 TABLET | Refills: 0 | OUTPATIENT
Start: 2024-12-03

## 2024-12-04 DIAGNOSIS — G47.411 NARCOLEPSY WITH CATAPLEXY (HHS-HCC): ICD-10-CM

## 2024-12-04 DIAGNOSIS — G47.10 HYPERSOMNIA: ICD-10-CM

## 2024-12-04 DIAGNOSIS — G47.11 IDIOPATHIC HYPERSOMNIA: ICD-10-CM

## 2024-12-04 NOTE — TELEPHONE ENCOUNTER
Pt requesting refills of medications- per provider, will refill for remainder of time until pt sees Dr. Aguilera on 12/19.     This RN checked OARRS and it is consist with prescribed medications. Patient has been filling Rx appropriately. Prescription request sent to provider to review.

## 2024-12-05 ENCOUNTER — PHARMACY VISIT (OUTPATIENT)
Dept: PHARMACY | Facility: CLINIC | Age: 19
End: 2024-12-05
Payer: COMMERCIAL

## 2024-12-05 PROCEDURE — RXMED WILLOW AMBULATORY MEDICATION CHARGE

## 2024-12-05 RX ORDER — MODAFINIL 200 MG/1
200 TABLET ORAL DAILY
Qty: 30 TABLET | Refills: 0 | Status: SHIPPED | OUTPATIENT
Start: 2024-12-05 | End: 2025-01-04

## 2024-12-05 RX ORDER — DEXTROAMPHETAMINE SACCHARATE, AMPHETAMINE ASPARTATE MONOHYDRATE, DEXTROAMPHETAMINE SULFATE AND AMPHETAMINE SULFATE 2.5; 2.5; 2.5; 2.5 MG/1; MG/1; MG/1; MG/1
20 CAPSULE, EXTENDED RELEASE ORAL EVERY MORNING
Qty: 28 CAPSULE | Refills: 0 | Status: SHIPPED | OUTPATIENT
Start: 2024-12-05 | End: 2024-12-19

## 2024-12-05 RX ORDER — DEXTROAMPHETAMINE SACCHARATE, AMPHETAMINE ASPARTATE, DEXTROAMPHETAMINE SULFATE AND AMPHETAMINE SULFATE 2.5; 2.5; 2.5; 2.5 MG/1; MG/1; MG/1; MG/1
10 TABLET ORAL 2 TIMES DAILY
Qty: 28 TABLET | Refills: 0 | Status: SHIPPED | OUTPATIENT
Start: 2024-12-05 | End: 2024-12-19

## 2024-12-09 ENCOUNTER — APPOINTMENT (OUTPATIENT)
Dept: RADIOLOGY | Facility: HOSPITAL | Age: 19
End: 2024-12-09
Payer: COMMERCIAL

## 2024-12-09 ENCOUNTER — HOSPITAL ENCOUNTER (EMERGENCY)
Facility: HOSPITAL | Age: 19
Discharge: HOME | End: 2024-12-10
Attending: EMERGENCY MEDICINE
Payer: COMMERCIAL

## 2024-12-09 ENCOUNTER — CLINICAL SUPPORT (OUTPATIENT)
Dept: EMERGENCY MEDICINE | Facility: HOSPITAL | Age: 19
End: 2024-12-09
Payer: COMMERCIAL

## 2024-12-09 VITALS
OXYGEN SATURATION: 99 % | DIASTOLIC BLOOD PRESSURE: 88 MMHG | TEMPERATURE: 96.8 F | SYSTOLIC BLOOD PRESSURE: 136 MMHG | RESPIRATION RATE: 16 BRPM | HEART RATE: 94 BPM

## 2024-12-09 DIAGNOSIS — R06.00 DYSPNEA, UNSPECIFIED TYPE: Primary | ICD-10-CM

## 2024-12-09 DIAGNOSIS — R07.9 CHEST PAIN, UNSPECIFIED TYPE: ICD-10-CM

## 2024-12-09 LAB
ALBUMIN SERPL BCP-MCNC: 4.6 G/DL (ref 3.4–5)
ALP SERPL-CCNC: 63 U/L (ref 33–120)
ALT SERPL W P-5'-P-CCNC: 7 U/L (ref 7–52)
ANION GAP SERPL CALC-SCNC: 15 MMOL/L (ref 10–20)
APPEARANCE UR: CLEAR
AST SERPL W P-5'-P-CCNC: 18 U/L (ref 9–39)
BASOPHILS # BLD AUTO: 0.03 X10*3/UL (ref 0–0.1)
BASOPHILS NFR BLD AUTO: 0.3 %
BILIRUB SERPL-MCNC: 0.5 MG/DL (ref 0–1.2)
BILIRUB UR STRIP.AUTO-MCNC: NEGATIVE MG/DL
BUN SERPL-MCNC: 5 MG/DL (ref 6–23)
CALCIUM SERPL-MCNC: 9.5 MG/DL (ref 8.6–10.6)
CARDIAC TROPONIN I PNL SERPL HS: <3 NG/L (ref 0–53)
CARDIAC TROPONIN I PNL SERPL HS: <3 NG/L (ref 0–53)
CHLORIDE SERPL-SCNC: 101 MMOL/L (ref 98–107)
CO2 SERPL-SCNC: 25 MMOL/L (ref 21–32)
COLOR UR: ABNORMAL
CREAT SERPL-MCNC: 0.79 MG/DL (ref 0.5–1.3)
EGFRCR SERPLBLD CKD-EPI 2021: >90 ML/MIN/1.73M*2
EOSINOPHIL # BLD AUTO: 0.03 X10*3/UL (ref 0–0.7)
EOSINOPHIL NFR BLD AUTO: 0.3 %
ERYTHROCYTE [DISTWIDTH] IN BLOOD BY AUTOMATED COUNT: 16.6 % (ref 11.5–14.5)
GLUCOSE SERPL-MCNC: 84 MG/DL (ref 74–99)
GLUCOSE UR STRIP.AUTO-MCNC: NORMAL MG/DL
HCT VFR BLD AUTO: 37.2 % (ref 36–52)
HGB BLD-MCNC: 12.1 G/DL (ref 12–17.5)
IMM GRANULOCYTES # BLD AUTO: 0.05 X10*3/UL (ref 0–0.7)
IMM GRANULOCYTES NFR BLD AUTO: 0.5 % (ref 0–0.9)
KETONES UR STRIP.AUTO-MCNC: ABNORMAL MG/DL
LEUKOCYTE ESTERASE UR QL STRIP.AUTO: NEGATIVE
LYMPHOCYTES # BLD AUTO: 1.83 X10*3/UL (ref 1.2–4.8)
LYMPHOCYTES NFR BLD AUTO: 18.3 %
MAGNESIUM SERPL-MCNC: 1.92 MG/DL (ref 1.6–2.4)
MCH RBC QN AUTO: 22.8 PG (ref 26–34)
MCHC RBC AUTO-ENTMCNC: 32.5 G/DL (ref 32–36)
MCV RBC AUTO: 70 FL (ref 80–100)
MONOCYTES # BLD AUTO: 0.83 X10*3/UL (ref 0.1–1)
MONOCYTES NFR BLD AUTO: 8.3 %
NEUTROPHILS # BLD AUTO: 7.22 X10*3/UL (ref 1.2–7.7)
NEUTROPHILS NFR BLD AUTO: 72.3 %
NITRITE UR QL STRIP.AUTO: NEGATIVE
NRBC BLD-RTO: 0 /100 WBCS (ref 0–0)
PH UR STRIP.AUTO: 7 [PH]
PLATELET # BLD AUTO: 305 X10*3/UL (ref 150–450)
POTASSIUM SERPL-SCNC: 3.9 MMOL/L (ref 3.5–5.3)
PREGNANCY TEST URINE, POC: NEGATIVE
PROT SERPL-MCNC: 7.2 G/DL (ref 6.4–8.2)
PROT UR STRIP.AUTO-MCNC: NEGATIVE MG/DL
RBC # BLD AUTO: 5.3 X10*6/UL (ref 4–5.9)
RBC # UR STRIP.AUTO: NEGATIVE /UL
SODIUM SERPL-SCNC: 137 MMOL/L (ref 136–145)
SP GR UR STRIP.AUTO: 1.01
UROBILINOGEN UR STRIP.AUTO-MCNC: NORMAL MG/DL
WBC # BLD AUTO: 10 X10*3/UL (ref 4.4–11.3)

## 2024-12-09 PROCEDURE — 94640 AIRWAY INHALATION TREATMENT: CPT

## 2024-12-09 PROCEDURE — 85025 COMPLETE CBC W/AUTO DIFF WBC: CPT

## 2024-12-09 PROCEDURE — 80053 COMPREHEN METABOLIC PANEL: CPT

## 2024-12-09 PROCEDURE — 2550000001 HC RX 255 CONTRASTS

## 2024-12-09 PROCEDURE — 2500000001 HC RX 250 WO HCPCS SELF ADMINISTERED DRUGS (ALT 637 FOR MEDICARE OP)

## 2024-12-09 PROCEDURE — 71275 CT ANGIOGRAPHY CHEST: CPT

## 2024-12-09 PROCEDURE — 71046 X-RAY EXAM CHEST 2 VIEWS: CPT | Performed by: RADIOLOGY

## 2024-12-09 PROCEDURE — 81003 URINALYSIS AUTO W/O SCOPE: CPT

## 2024-12-09 PROCEDURE — 36415 COLL VENOUS BLD VENIPUNCTURE: CPT

## 2024-12-09 PROCEDURE — 71046 X-RAY EXAM CHEST 2 VIEWS: CPT

## 2024-12-09 PROCEDURE — 81025 URINE PREGNANCY TEST: CPT

## 2024-12-09 PROCEDURE — 93005 ELECTROCARDIOGRAM TRACING: CPT

## 2024-12-09 PROCEDURE — 2500000005 HC RX 250 GENERAL PHARMACY W/O HCPCS

## 2024-12-09 PROCEDURE — 71275 CT ANGIOGRAPHY CHEST: CPT | Performed by: RADIOLOGY

## 2024-12-09 PROCEDURE — 99285 EMERGENCY DEPT VISIT HI MDM: CPT | Mod: 25 | Performed by: EMERGENCY MEDICINE

## 2024-12-09 PROCEDURE — 84484 ASSAY OF TROPONIN QUANT: CPT

## 2024-12-09 PROCEDURE — 2500000002 HC RX 250 W HCPCS SELF ADMINISTERED DRUGS (ALT 637 FOR MEDICARE OP, ALT 636 FOR OP/ED)

## 2024-12-09 PROCEDURE — 96374 THER/PROPH/DIAG INJ IV PUSH: CPT | Mod: 59

## 2024-12-09 PROCEDURE — 2500000004 HC RX 250 GENERAL PHARMACY W/ HCPCS (ALT 636 FOR OP/ED)

## 2024-12-09 PROCEDURE — 83735 ASSAY OF MAGNESIUM: CPT

## 2024-12-09 RX ORDER — ACETAMINOPHEN 325 MG/1
975 TABLET ORAL ONCE
Status: COMPLETED | OUTPATIENT
Start: 2024-12-09 | End: 2024-12-09

## 2024-12-09 RX ORDER — FAMOTIDINE 40 MG/1
40 TABLET, FILM COATED ORAL ONCE
Status: COMPLETED | OUTPATIENT
Start: 2024-12-09 | End: 2024-12-09

## 2024-12-09 RX ORDER — DIPHENHYDRAMINE HCL 12.5MG/5ML
25 LIQUID (ML) ORAL ONCE
Status: COMPLETED | OUTPATIENT
Start: 2024-12-09 | End: 2024-12-09

## 2024-12-09 RX ORDER — WATER
200 LIQUID (ML) MISCELLANEOUS
Status: COMPLETED | OUTPATIENT
Start: 2024-12-09 | End: 2024-12-09

## 2024-12-09 RX ORDER — LIDOCAINE HYDROCHLORIDE 20 MG/ML
10 SOLUTION OROPHARYNGEAL ONCE
Status: COMPLETED | OUTPATIENT
Start: 2024-12-09 | End: 2024-12-09

## 2024-12-09 RX ORDER — KETOROLAC TROMETHAMINE 15 MG/ML
15 INJECTION, SOLUTION INTRAMUSCULAR; INTRAVENOUS ONCE
Status: COMPLETED | OUTPATIENT
Start: 2024-12-09 | End: 2024-12-09

## 2024-12-09 RX ORDER — ALUMINUM HYDROXIDE, MAGNESIUM HYDROXIDE, AND SIMETHICONE 1200; 120; 1200 MG/30ML; MG/30ML; MG/30ML
30 SUSPENSION ORAL ONCE
Status: COMPLETED | OUTPATIENT
Start: 2024-12-09 | End: 2024-12-09

## 2024-12-09 ASSESSMENT — HEART SCORE
AGE: <45
HISTORY: SLIGHTLY SUSPICIOUS
ECG: NON-SPECIFIC REPOLARIZATION DISTURBANCE
TROPONIN: LESS THAN OR EQUAL TO NORMAL LIMIT
RISK FACTORS: 1-2 RISK FACTORS
HEART SCORE: 2

## 2024-12-09 ASSESSMENT — COLUMBIA-SUICIDE SEVERITY RATING SCALE - C-SSRS
6. HAVE YOU EVER DONE ANYTHING, STARTED TO DO ANYTHING, OR PREPARED TO DO ANYTHING TO END YOUR LIFE?: NO
2. HAVE YOU ACTUALLY HAD ANY THOUGHTS OF KILLING YOURSELF?: NO
1. IN THE PAST MONTH, HAVE YOU WISHED YOU WERE DEAD OR WISHED YOU COULD GO TO SLEEP AND NOT WAKE UP?: NO

## 2024-12-09 NOTE — Clinical Note
Lisa Ramirez was seen and treated in our emergency department on 12/9/2024.  He may return to school on 12/12/2024.      If you have any questions or concerns, please don't hesitate to call.      Chapincito Griffith, APRN-CNP

## 2024-12-09 NOTE — ED TRIAGE NOTES
Pt reports midsternal, non-radiating, constant chest pain for few minutes, started while walking. PMH narcolepsy, epilepsy, asthma, pneumomediastinum few months ago.

## 2024-12-10 ENCOUNTER — PHARMACY VISIT (OUTPATIENT)
Dept: PHARMACY | Facility: CLINIC | Age: 19
End: 2024-12-10
Payer: COMMERCIAL

## 2024-12-10 LAB
ANION GAP BLDV CALCULATED.4IONS-SCNC: 7 MMOL/L (ref 10–25)
ATRIAL RATE: 90 BPM
BASE EXCESS BLDV CALC-SCNC: 3 MMOL/L (ref -2–3)
BODY TEMPERATURE: 37 DEGREES CELSIUS
CA-I BLDV-SCNC: 1.22 MMOL/L (ref 1.1–1.33)
CHLORIDE BLDV-SCNC: 102 MMOL/L (ref 98–107)
GLUCOSE BLDV-MCNC: 109 MG/DL (ref 74–99)
HCO3 BLDV-SCNC: 28.5 MMOL/L (ref 22–26)
HCT VFR BLD EST: 38 % (ref 36–52)
HGB BLDV-MCNC: 12.6 G/DL (ref 12–17.5)
HOLD SPECIMEN: NORMAL
LACTATE BLDV-SCNC: 0.8 MMOL/L (ref 0.4–2)
OXYHGB MFR BLDV: 59.6 % (ref 45–75)
P AXIS: 66 DEGREES
P OFFSET: 200 MS
P ONSET: 148 MS
PCO2 BLDV: 46 MM HG (ref 41–51)
PH BLDV: 7.4 PH (ref 7.33–7.43)
PO2 BLDV: 42 MM HG (ref 35–45)
POTASSIUM BLDV-SCNC: 3.8 MMOL/L (ref 3.5–5.3)
PR INTERVAL: 132 MS
Q ONSET: 214 MS
QRS COUNT: 15 BEATS
QRS DURATION: 94 MS
QT INTERVAL: 362 MS
QTC CALCULATION(BAZETT): 442 MS
QTC FREDERICIA: 414 MS
R AXIS: 70 DEGREES
SAO2 % BLDV: 61 % (ref 45–75)
SODIUM BLDV-SCNC: 134 MMOL/L (ref 136–145)
T AXIS: -7 DEGREES
T OFFSET: 395 MS
VENTRICULAR RATE: 90 BPM

## 2024-12-10 PROCEDURE — 2500000001 HC RX 250 WO HCPCS SELF ADMINISTERED DRUGS (ALT 637 FOR MEDICARE OP)

## 2024-12-10 PROCEDURE — RXMED WILLOW AMBULATORY MEDICATION CHARGE

## 2024-12-10 PROCEDURE — 2500000002 HC RX 250 W HCPCS SELF ADMINISTERED DRUGS (ALT 637 FOR MEDICARE OP, ALT 636 FOR OP/ED)

## 2024-12-10 PROCEDURE — 84132 ASSAY OF SERUM POTASSIUM: CPT

## 2024-12-10 PROCEDURE — 94640 AIRWAY INHALATION TREATMENT: CPT

## 2024-12-10 RX ORDER — HYDROXYZINE HYDROCHLORIDE 25 MG/1
25 TABLET, FILM COATED ORAL ONCE
Status: DISCONTINUED | OUTPATIENT
Start: 2024-12-10 | End: 2024-12-10 | Stop reason: HOSPADM

## 2024-12-10 RX ORDER — IPRATROPIUM BROMIDE AND ALBUTEROL SULFATE 2.5; .5 MG/3ML; MG/3ML
3 SOLUTION RESPIRATORY (INHALATION)
Status: DISCONTINUED | OUTPATIENT
Start: 2024-12-10 | End: 2024-12-10 | Stop reason: HOSPADM

## 2024-12-10 RX ORDER — IPRATROPIUM BROMIDE AND ALBUTEROL SULFATE 2.5; .5 MG/3ML; MG/3ML
3 SOLUTION RESPIRATORY (INHALATION)
Status: DISCONTINUED | OUTPATIENT
Start: 2024-12-10 | End: 2024-12-10

## 2024-12-10 NOTE — DISCHARGE INSTRUCTIONS
Your workup was normal.  We recommend that you follow-up with the [facility] or your primary care provider along with pulmonologist to have elevated for this concern for shortness of breath.  Please return to emergency room with any new or worsening symptoms.

## 2024-12-19 ENCOUNTER — APPOINTMENT (OUTPATIENT)
Dept: SLEEP MEDICINE | Facility: HOSPITAL | Age: 19
End: 2024-12-19
Payer: COMMERCIAL

## 2024-12-19 DIAGNOSIS — G47.10 HYPERSOMNIA: ICD-10-CM

## 2024-12-20 DIAGNOSIS — G47.11 IDIOPATHIC HYPERSOMNIA: ICD-10-CM

## 2024-12-20 DIAGNOSIS — G47.411 NARCOLEPSY WITH CATAPLEXY (HHS-HCC): ICD-10-CM

## 2024-12-20 PROCEDURE — RXMED WILLOW AMBULATORY MEDICATION CHARGE

## 2024-12-21 ENCOUNTER — PHARMACY VISIT (OUTPATIENT)
Dept: PHARMACY | Facility: CLINIC | Age: 19
End: 2024-12-21
Payer: COMMERCIAL

## 2024-12-23 RX ORDER — DEXTROAMPHETAMINE SACCHARATE, AMPHETAMINE ASPARTATE, DEXTROAMPHETAMINE SULFATE AND AMPHETAMINE SULFATE 2.5; 2.5; 2.5; 2.5 MG/1; MG/1; MG/1; MG/1
10 TABLET ORAL 2 TIMES DAILY
Qty: 60 TABLET | Refills: 0 | Status: SHIPPED | OUTPATIENT
Start: 2024-12-23 | End: 2025-01-22

## 2024-12-23 RX ORDER — DEXTROAMPHETAMINE SACCHARATE, AMPHETAMINE ASPARTATE MONOHYDRATE, DEXTROAMPHETAMINE SULFATE AND AMPHETAMINE SULFATE 2.5; 2.5; 2.5; 2.5 MG/1; MG/1; MG/1; MG/1
20 CAPSULE, EXTENDED RELEASE ORAL EVERY MORNING
Qty: 60 CAPSULE | Refills: 0 | Status: SHIPPED | OUTPATIENT
Start: 2024-12-23 | End: 2025-01-22

## 2024-12-23 RX ORDER — MODAFINIL 200 MG/1
200 TABLET ORAL DAILY
Qty: 30 TABLET | Refills: 0 | Status: SHIPPED | OUTPATIENT
Start: 2024-12-23 | End: 2024-12-27

## 2024-12-26 ENCOUNTER — OFFICE VISIT (OUTPATIENT)
Dept: SLEEP MEDICINE | Facility: HOSPITAL | Age: 19
End: 2024-12-26
Payer: COMMERCIAL

## 2024-12-26 DIAGNOSIS — G47.11 IDIOPATHIC HYPERSOMNIA: Primary | ICD-10-CM

## 2024-12-26 DIAGNOSIS — F32.A DEPRESSION, UNSPECIFIED DEPRESSION TYPE: ICD-10-CM

## 2024-12-26 PROCEDURE — 99214 OFFICE O/P EST MOD 30 MIN: CPT | Mod: 95 | Performed by: STUDENT IN AN ORGANIZED HEALTH CARE EDUCATION/TRAINING PROGRAM

## 2024-12-26 PROCEDURE — 1036F TOBACCO NON-USER: CPT | Performed by: STUDENT IN AN ORGANIZED HEALTH CARE EDUCATION/TRAINING PROGRAM

## 2024-12-26 PROCEDURE — 99214 OFFICE O/P EST MOD 30 MIN: CPT | Performed by: STUDENT IN AN ORGANIZED HEALTH CARE EDUCATION/TRAINING PROGRAM

## 2024-12-27 ENCOUNTER — TELEPHONE (OUTPATIENT)
Dept: SLEEP MEDICINE | Facility: HOSPITAL | Age: 19
End: 2024-12-27
Payer: COMMERCIAL

## 2024-12-27 DIAGNOSIS — G47.10 HYPERSOMNIA: ICD-10-CM

## 2024-12-27 RX ORDER — MODAFINIL 200 MG/1
200 TABLET ORAL 2 TIMES DAILY
Qty: 60 TABLET | Refills: 0 | Status: SHIPPED | OUTPATIENT
Start: 2024-12-27 | End: 2025-01-26

## 2024-12-27 ASSESSMENT — ENCOUNTER SYMPTOMS
CARDIOVASCULAR NEGATIVE: 1
RESPIRATORY NEGATIVE: 1
PSYCHIATRIC NEGATIVE: 1
NEUROLOGICAL NEGATIVE: 1
CONSTITUTIONAL NEGATIVE: 1

## 2024-12-27 NOTE — PROGRESS NOTES
Patient: Lisa Ramirez    [id number]  : 2005 -- AGE 19 y.o.    Provider: Ezekiel Aguilera MD     Location [facility]   Service Date: 2024              [facility]  Followup Visit Note  Virtual or Telephone Consent    An interactive audio and video telecommunication system which permits real time communications between the patient (at the originating site) and provider (at the distant site) was utilized to provide this telehealth service.   Verbal consent was requested and obtained from Lisa Ramirez on this date, 24 for a telehealth visit.      ASSESSMENT/PLAN     Mr. Ramirez is a 19 y.o. adult and he returns in followup to the [facility] for the problems listed below on 24     Problem List, Orders, Assessment, Recommendations:  Problem List Items Addressed This Visit             ICD-10-CM    Depression F32.A     Stable on Wellbutrin 450 mg daily, however, with her epilepsy history, she needs some dosage adjustment.  Neuro and Psych are working on this.    No SI/HI         Idiopathic hypersomnia - Primary G47.11     MSLT showed MSL of 6.5 min with no SOREM.  The PSG the night before demonstrated 500 minutes of sleep time  She is extremely sleepy -> reported  on ESS.    She tried Modafinil 200 mg and didn't like how it made her feel.  She felt her heart racing really fast but wasn't sure if she was having asthma attack as well.  She stopped after 2 days and does not want to try it again.    WE discussed about other options such as Adderall, Ritalin and Xywav.  She doesn't want to take Xywav as she is afraid of the worsening of anxiety (already pretty anxious because she is taking MC[employer] soon).  She will keep me posted in a week via [employer] message    We agreed to try Adderall first, we will start with 20 mg XR in the morning and 10 mg IR TID PRN.  She is instructed to get herself started slowly  "and see how well she can tolerate the medication    Update on 12/26/2024    Did best with Adderall IR 10 mg at 7:30 am with Modafinil 200 mg, then Adderall XR 20 mg at 11:00 am, then IR 10 mg with Modafinil 200 mg again at 2 pm.    However, the improvement is still insufficient.  I encouraged her to try to IR 10 mg additionally at 5-6 pm to help with her early evening symptoms.  Patient is agreeable and will let me know via [employer] how much medication she has left for me to figure out when to send in the refills    She told me that she wants to stay with me as her sleep provider as I told her that she shouldn't be receiving controlled substance for the same problem from more than one provider (she also saw Dr[facility] Chris Ching at [facility] couple times in the past).            Disposition  Return to clinic in 1.5 months       HISTORY OF PRESENT ILLNESS     HISTORY OF PRESENT ILLNESS   Lisa Ramirez \"Alf" is a 19 y.o. adult with h/o Idiopathic Hypersomnia and Depression who presents to a [facility] for followup.     Assessment and plan from last visit: 11/7/2024    Mr. Ramirez is a 19 y.o. adult and he returns in followup to the [facility] for the problems listed below on 11/07/24      Problem List, Orders, Assessment, Recommendations:  Problem List Items Addressed This Visit               ICD-10-CM     Depression F32.A       Stable on Wellbutrin 450 mg daily, however, with her epilepsy history, she needs some dosage adjustment.  Neuro and Psych are working on this.     No SI/HI           Obesity (BMI 30-39.9) E66.9           BMI Readings from Last 1 Encounters:   11/07/24 33.83 kg/m²      - encouraged healthy weight loss via diet and exercise              Idiopathic hypersomnia G47.11       MSLT showed MSL of 6.5 min with no SOREM.  The PSG the night before demonstrated 500 minutes of sleep time  She is extremely sleepy -> reported 24/24 on ESS.     She " tried Modafinil 200 mg and didn't like how it made her feel.  She felt her heart racing really fast but wasn't sure if she was having asthma attack as well.  She stopped after 2 days and does not want to try it again.     WE discussed about other options such as Adderall, Ritalin and Xywav.  She doesn't want to take Xywav as she is afraid of the worsening of anxiety (already pretty anxious because she is taking [employer] soon).  She will keep me posted in a week via [employer] message     We agreed to try Adderall first, we will start with 20 mg XR in the morning and 10 mg IR TID PRN.  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  The regimen is listed in the A&P section.  We will make changes based on the best regimen.    RLS Followup:   none.    Daytime Symptoms    Patient reports DAYTIME SYMPTOMS: excessively sleepy during the day  Naps: sometimes  Fatigue: symptoms bothersome, but easily able to carry out all usual work/school/family activities    REVIEW OF SYSTEMS     REVIEW OF SYSTEMS  Review of Systems   Constitutional: Negative.    HENT: Negative.     Respiratory: Negative.     Cardiovascular: Negative.    Genitourinary: Negative.    Skin: Negative.    Neurological: Negative.    Psychiatric/Behavioral: Negative.           ALLERGIES AND MEDICATIONS      ALLERGIES  No Known Allergies    MEDICATIONS: He has a current medication list which includes the following prescription(s): acetaminophen - Take 2 tablets (650 mg) by mouth every 4 hours as needed for fever, albuterol - USE ONE DOSE EVERY 4 HOURS AS NEEDED FOR SHORTNESS OF BREATH, albuterol - Inhale 2 puffs every 4 hours if needed, amphetamine-dextroamphetamine - Take 1 tablet (10 mg) by mouth 2 times a day, amphetamine-dextroamphetamine xr - Take 2 capsules (20 mg) by mouth once daily in the morning. Do not crush or chew, budesonide-formoterol - Inhale twice a day, budesonide-formoterol - Inhale 1 Puff as instructed two times a day, bupropion xl - 1 TABLET (150 MG) ORALLY DAILY IN THE MORNING - TAKE WITH 300MG PILL = 450MG DAILY, bupropion xl - TAKE 1 TABLET (300 MG) BY MOUTH ONCE DAILY IN THE MORNING, slynd - Take 1 tablet by mouth once daily, ferrous sulfate (325 mg ferrous sulfate) - Take by mouth once daily, fluticasone propion-salmeterol - Inhale 1 puff 2 times a day. Rinse mouth with water after use to reduce aftertaste and incidence of candidiasis. Do not swallow, gabapentin - Take 1 capsule (300 mg) by mouth once daily at bedtime, humidifiers - 1 Units once daily as needed (for shortness of breath, wheezing), ibuprofen - Take 1 tablet (600 mg) by mouth every 8 hours if needed for mild pain (1 - 3) or fever (temp greater than 38.0 C), ipratropium - Inhale once daily, lamotrigine - Take 1 tablet (100 mg) by mouth once daily, modafinil - Take 1 tablet (200 mg) by mouth once daily, [DISCONTINUED] memantine - Take 1 tablet (5 mg) by mouth once daily, and [DISCONTINUED] methocarbamol - Take 1 tablet by mouth three times a day.    PAST MEDICAL HISTORY : He  has a past medical history of Asthma, Exercise induced bronchospasm ([facility]), Personal history of diseases of the blood and blood-forming organs and certain disorders involving the immune mechanism, Personal history of other mental and behavioral  disorders, Personal history of other mental and behavioral disorders, and Personal history of other specified conditions.    PAST SURGICAL HISTORY: He  has no past surgical history on file.     FAMILY HISTORY: No changes since previous visit. Otherwise non-contributory as charted.     SOCIAL HISTORY  He  reports that he has never smoked. He has never used smokeless tobacco. He reports current alcohol use. He reports current drug use. Drug: Marijuana.       PHYSICAL EXAM     VITAL SIGNS: There were no vitals taken for this visit.     PREVIOUS WEIGHTS:  Wt Readings from Last 3 Encounters:   11/07/24 94.3 kg (208 lb)   11/06/24 94.8 kg (209 lb)   11/04/24 94.8 kg (209 lb)         RESULTS/DATA     Bicarbonate (mmol/L)   Date Value   12/09/2024 25   10/14/2024 27   02/07/2024 25     Iron (ug/dL)   Date Value   10/14/2024 13 (L)   09/01/2022 15 (L)     % Saturation (%)   Date Value   10/14/2024 3 (L)     Iron Saturation (%)   Date Value   09/01/2022 3 (L)     TIBC (ug/dL)   Date Value   10/14/2024 413   09/01/2022 454 (H)     Ferritin (ug/L)   Date Value   09/01/2022 16

## 2024-12-27 NOTE — TELEPHONE ENCOUNTER
Called pt to schedule upcoming appointment, verified dosages of medications with pt and Dr. Aguilera.

## 2024-12-27 NOTE — ASSESSMENT & PLAN NOTE
MSLT showed MSL of 6.5 min with no SOREM.  The PSG the night before demonstrated 500 minutes of sleep time  She is extremely sleepy -> reported 24/24 on ESS.    She tried Modafinil 200 mg and didn't like how it made her feel.  She felt her heart racing really fast but wasn't sure if she was having asthma attack as well.  She stopped after 2 days and does not want to try it again.    WE discussed about other options such as Adderall, Ritalin and Xywav.  She doesn't want to take Xywav as she is afraid of the worsening of anxiety (already pretty anxious because she is taking [employer] soon).  She will keep me posted in a week via [employer] message    We agreed to try Adderall first, we will start with 20 mg XR in the morning and 10 mg IR TID PRN.  She is instructed to get herself started slowly and see how well she can tolerate the medication    Update on 12/26/2024    Did best with Adderall IR 10 mg at 7:30 am with Modafinil 200 mg, then Adderall XR 20 mg at 11:00 am, then IR 10 mg with Modafinil 200 mg again at 2 pm.    However, the improvement is still insufficient.  I encouraged her to try to IR 10 mg additionally at 5-6 pm to help with her early evening symptoms.  Patient is agreeable and will let me know via [employer] how much medication she has left for me to figure out when to send in the refills    She told me that she wants to stay with me as her sleep provider as I told her that she shouldn't be receiving controlled substance for the same problem from more than one provider (she also saw Dr. Chris Chnig at [facility] couple times in the past).    We will update CSA and UDS at follow up visit

## 2025-01-02 ENCOUNTER — TELEPHONE (OUTPATIENT)
Dept: SLEEP MEDICINE | Facility: HOSPITAL | Age: 20
End: 2025-01-02
Payer: COMMERCIAL

## 2025-01-02 DIAGNOSIS — G47.10 HYPERSOMNIA: ICD-10-CM

## 2025-01-02 NOTE — TELEPHONE ENCOUNTER
Pt called to report location change and requesting modafinil refill sent to alternate pharmacy. Pt has not picked up modafinil from original pharmacy.     Modafinil #60 for 30 days requested.     This RN checked OARRS and it is consist with prescribed medications. Patient has been filling Rx appropriately. Prescription request sent to provider to review.    Next appt with sleep med provider on 2/13/2025

## 2025-01-06 RX ORDER — MODAFINIL 200 MG/1
200 TABLET ORAL 2 TIMES DAILY
Qty: 60 TABLET | Refills: 0 | Status: SHIPPED | OUTPATIENT
Start: 2025-01-06 | End: 2025-02-05

## 2025-01-14 ENCOUNTER — PHARMACY VISIT (OUTPATIENT)
Dept: PHARMACY | Facility: CLINIC | Age: 20
End: 2025-01-14
Payer: COMMERCIAL

## 2025-01-14 PROCEDURE — RXMED WILLOW AMBULATORY MEDICATION CHARGE

## 2025-01-16 ENCOUNTER — HOSPITAL ENCOUNTER (OUTPATIENT)
Facility: HOSPITAL | Age: 20
Setting detail: OBSERVATION
Discharge: HOME | End: 2025-01-18
Attending: STUDENT IN AN ORGANIZED HEALTH CARE EDUCATION/TRAINING PROGRAM | Admitting: STUDENT IN AN ORGANIZED HEALTH CARE EDUCATION/TRAINING PROGRAM
Payer: COMMERCIAL

## 2025-01-16 DIAGNOSIS — F32.A DEPRESSION, UNSPECIFIED DEPRESSION TYPE: ICD-10-CM

## 2025-01-16 DIAGNOSIS — D64.9 ANEMIA, UNSPECIFIED TYPE: ICD-10-CM

## 2025-01-16 DIAGNOSIS — R56.9 SEIZURE (MULTI): Primary | ICD-10-CM

## 2025-01-16 LAB
ALBUMIN SERPL BCP-MCNC: 4.3 G/DL (ref 3.4–5)
ALP SERPL-CCNC: 63 U/L (ref 33–120)
ALT SERPL W P-5'-P-CCNC: 9 U/L (ref 7–52)
ANION GAP SERPL CALC-SCNC: 12 MMOL/L (ref 10–20)
APPEARANCE UR: CLEAR
AST SERPL W P-5'-P-CCNC: 12 U/L (ref 9–39)
BASOPHILS # BLD AUTO: 0.06 X10*3/UL (ref 0–0.1)
BASOPHILS NFR BLD AUTO: 0.6 %
BILIRUB SERPL-MCNC: 0.4 MG/DL (ref 0–1.2)
BILIRUB UR STRIP.AUTO-MCNC: NEGATIVE MG/DL
BUN SERPL-MCNC: 14 MG/DL (ref 6–23)
CALCIUM SERPL-MCNC: 9.3 MG/DL (ref 8.6–10.6)
CHLORIDE SERPL-SCNC: 107 MMOL/L (ref 98–107)
CO2 SERPL-SCNC: 23 MMOL/L (ref 21–32)
COLOR UR: NORMAL
CREAT SERPL-MCNC: 0.73 MG/DL (ref 0.5–1.3)
EGFRCR SERPLBLD CKD-EPI 2021: >90 ML/MIN/1.73M*2
EOSINOPHIL # BLD AUTO: 0.05 X10*3/UL (ref 0–0.7)
EOSINOPHIL NFR BLD AUTO: 0.5 %
ERYTHROCYTE [DISTWIDTH] IN BLOOD BY AUTOMATED COUNT: 15.9 % (ref 11.5–14.5)
GLUCOSE SERPL-MCNC: 75 MG/DL (ref 74–99)
GLUCOSE UR STRIP.AUTO-MCNC: NORMAL MG/DL
HCT VFR BLD AUTO: 34.7 % (ref 36–52)
HGB BLD-MCNC: 11.4 G/DL (ref 12–17.5)
IMM GRANULOCYTES # BLD AUTO: 0.05 X10*3/UL (ref 0–0.7)
IMM GRANULOCYTES NFR BLD AUTO: 0.5 % (ref 0–0.9)
KETONES UR STRIP.AUTO-MCNC: NEGATIVE MG/DL
LAMOTRIGINE SERPL-MCNC: <1 UG/ML (ref 2.5–15)
LEUKOCYTE ESTERASE UR QL STRIP.AUTO: NEGATIVE
LYMPHOCYTES # BLD AUTO: 2.8 X10*3/UL (ref 1.2–4.8)
LYMPHOCYTES NFR BLD AUTO: 27.8 %
MCH RBC QN AUTO: 22.7 PG (ref 26–34)
MCHC RBC AUTO-ENTMCNC: 32.9 G/DL (ref 32–36)
MCV RBC AUTO: 69 FL (ref 80–100)
MONOCYTES # BLD AUTO: 0.99 X10*3/UL (ref 0.1–1)
MONOCYTES NFR BLD AUTO: 9.8 %
NEUTROPHILS # BLD AUTO: 6.12 X10*3/UL (ref 1.2–7.7)
NEUTROPHILS NFR BLD AUTO: 60.8 %
NITRITE UR QL STRIP.AUTO: NEGATIVE
NRBC BLD-RTO: 0 /100 WBCS (ref 0–0)
PH UR STRIP.AUTO: 6 [PH]
PLATELET # BLD AUTO: 275 X10*3/UL (ref 150–450)
POTASSIUM SERPL-SCNC: 3.3 MMOL/L (ref 3.5–5.3)
PROT SERPL-MCNC: 6.7 G/DL (ref 6.4–8.2)
PROT UR STRIP.AUTO-MCNC: NEGATIVE MG/DL
RBC # BLD AUTO: 5.03 X10*6/UL (ref 4–5.9)
RBC # UR STRIP.AUTO: NEGATIVE /UL
SODIUM SERPL-SCNC: 139 MMOL/L (ref 136–145)
SP GR UR STRIP.AUTO: 1.02
UROBILINOGEN UR STRIP.AUTO-MCNC: NORMAL MG/DL
WBC # BLD AUTO: 10.1 X10*3/UL (ref 4.4–11.3)

## 2025-01-16 PROCEDURE — 85025 COMPLETE CBC W/AUTO DIFF WBC: CPT

## 2025-01-16 PROCEDURE — 96361 HYDRATE IV INFUSION ADD-ON: CPT

## 2025-01-16 PROCEDURE — 96372 THER/PROPH/DIAG INJ SC/IM: CPT | Performed by: STUDENT IN AN ORGANIZED HEALTH CARE EDUCATION/TRAINING PROGRAM

## 2025-01-16 PROCEDURE — G0378 HOSPITAL OBSERVATION PER HR: HCPCS

## 2025-01-16 PROCEDURE — 2500000001 HC RX 250 WO HCPCS SELF ADMINISTERED DRUGS (ALT 637 FOR MEDICARE OP): Performed by: STUDENT IN AN ORGANIZED HEALTH CARE EDUCATION/TRAINING PROGRAM

## 2025-01-16 PROCEDURE — 99222 1ST HOSP IP/OBS MODERATE 55: CPT

## 2025-01-16 PROCEDURE — 2500000004 HC RX 250 GENERAL PHARMACY W/ HCPCS (ALT 636 FOR OP/ED)

## 2025-01-16 PROCEDURE — 80053 COMPREHEN METABOLIC PANEL: CPT

## 2025-01-16 PROCEDURE — 96374 THER/PROPH/DIAG INJ IV PUSH: CPT

## 2025-01-16 PROCEDURE — 36415 COLL VENOUS BLD VENIPUNCTURE: CPT

## 2025-01-16 PROCEDURE — 99285 EMERGENCY DEPT VISIT HI MDM: CPT | Mod: 25 | Performed by: STUDENT IN AN ORGANIZED HEALTH CARE EDUCATION/TRAINING PROGRAM

## 2025-01-16 PROCEDURE — 81003 URINALYSIS AUTO W/O SCOPE: CPT

## 2025-01-16 PROCEDURE — 2500000001 HC RX 250 WO HCPCS SELF ADMINISTERED DRUGS (ALT 637 FOR MEDICARE OP)

## 2025-01-16 PROCEDURE — 80175 DRUG SCREEN QUAN LAMOTRIGINE: CPT

## 2025-01-16 PROCEDURE — 94640 AIRWAY INHALATION TREATMENT: CPT | Mod: 59

## 2025-01-16 PROCEDURE — 99285 EMERGENCY DEPT VISIT HI MDM: CPT | Performed by: STUDENT IN AN ORGANIZED HEALTH CARE EDUCATION/TRAINING PROGRAM

## 2025-01-16 PROCEDURE — 2500000004 HC RX 250 GENERAL PHARMACY W/ HCPCS (ALT 636 FOR OP/ED): Performed by: STUDENT IN AN ORGANIZED HEALTH CARE EDUCATION/TRAINING PROGRAM

## 2025-01-16 RX ORDER — LAMOTRIGINE 25 MG/1
75 TABLET ORAL 2 TIMES DAILY
Status: DISCONTINUED | OUTPATIENT
Start: 2025-01-17 | End: 2025-01-18 | Stop reason: HOSPADM

## 2025-01-16 RX ORDER — LAMOTRIGINE 100 MG/1
100 TABLET ORAL ONCE
Status: COMPLETED | OUTPATIENT
Start: 2025-01-16 | End: 2025-01-16

## 2025-01-16 RX ORDER — DEXTROAMPHETAMINE SACCHARATE, AMPHETAMINE ASPARTATE, DEXTROAMPHETAMINE SULFATE AND AMPHETAMINE SULFATE 2.5; 2.5; 2.5; 2.5 MG/1; MG/1; MG/1; MG/1
10 TABLET ORAL 2 TIMES DAILY
Status: DISCONTINUED | OUTPATIENT
Start: 2025-01-16 | End: 2025-01-16

## 2025-01-16 RX ORDER — LEVETIRACETAM 750 MG/1
750 TABLET ORAL 2 TIMES DAILY
Qty: 180 TABLET | Refills: 0 | Status: SHIPPED | OUTPATIENT
Start: 2025-01-16 | End: 2025-01-16 | Stop reason: ALTCHOICE

## 2025-01-16 RX ORDER — DEXTROAMPHETAMINE SACCHARATE, AMPHETAMINE ASPARTATE, DEXTROAMPHETAMINE SULFATE AND AMPHETAMINE SULFATE 2.5; 2.5; 2.5; 2.5 MG/1; MG/1; MG/1; MG/1
10 TABLET ORAL
Status: DISCONTINUED | OUTPATIENT
Start: 2025-01-16 | End: 2025-01-18 | Stop reason: HOSPADM

## 2025-01-16 RX ORDER — ACETAMINOPHEN 500 MG
1000 TABLET ORAL EVERY 6 HOURS PRN
COMMUNITY

## 2025-01-16 RX ORDER — IBUPROFEN 200 MG
800 TABLET ORAL EVERY 8 HOURS PRN
COMMUNITY

## 2025-01-16 RX ORDER — LEVETIRACETAM 500 MG/1
750 TABLET ORAL ONCE
Status: COMPLETED | OUTPATIENT
Start: 2025-01-16 | End: 2025-01-16

## 2025-01-16 RX ORDER — LEVETIRACETAM 250 MG/1
750 TABLET ORAL 2 TIMES DAILY
Status: DISCONTINUED | OUTPATIENT
Start: 2025-01-16 | End: 2025-01-18 | Stop reason: HOSPADM

## 2025-01-16 RX ORDER — LORAZEPAM 2 MG/ML
2 INJECTION INTRAMUSCULAR EVERY 5 MIN PRN
Status: DISCONTINUED | OUTPATIENT
Start: 2025-01-16 | End: 2025-01-18 | Stop reason: HOSPADM

## 2025-01-16 RX ORDER — BUPROPION HYDROCHLORIDE 150 MG/1
300 TABLET ORAL EVERY MORNING
Status: DISCONTINUED | OUTPATIENT
Start: 2025-01-16 | End: 2025-01-18 | Stop reason: HOSPADM

## 2025-01-16 RX ORDER — GABAPENTIN 300 MG/1
300 CAPSULE ORAL NIGHTLY
Status: DISCONTINUED | OUTPATIENT
Start: 2025-01-16 | End: 2025-01-18 | Stop reason: HOSPADM

## 2025-01-16 RX ORDER — ACETAMINOPHEN 325 MG/1
650 TABLET ORAL EVERY 4 HOURS PRN
Status: DISCONTINUED | OUTPATIENT
Start: 2025-01-16 | End: 2025-01-18 | Stop reason: HOSPADM

## 2025-01-16 RX ORDER — ONDANSETRON HYDROCHLORIDE 2 MG/ML
4 INJECTION, SOLUTION INTRAVENOUS ONCE
Status: COMPLETED | OUTPATIENT
Start: 2025-01-16 | End: 2025-01-16

## 2025-01-16 RX ORDER — FLUTICASONE FUROATE AND VILANTEROL 200; 25 UG/1; UG/1
1 POWDER RESPIRATORY (INHALATION)
Status: DISCONTINUED | OUTPATIENT
Start: 2025-01-16 | End: 2025-01-18 | Stop reason: HOSPADM

## 2025-01-16 RX ORDER — ENOXAPARIN SODIUM 100 MG/ML
40 INJECTION SUBCUTANEOUS EVERY 24 HOURS
Status: DISCONTINUED | OUTPATIENT
Start: 2025-01-16 | End: 2025-01-18 | Stop reason: HOSPADM

## 2025-01-16 RX ORDER — BUPROPION HYDROCHLORIDE 150 MG/1
150 TABLET ORAL DAILY
Status: DISCONTINUED | OUTPATIENT
Start: 2025-01-16 | End: 2025-01-18 | Stop reason: HOSPADM

## 2025-01-16 RX ORDER — DEXTROAMPHETAMINE SACCHARATE, AMPHETAMINE ASPARTATE MONOHYDRATE, DEXTROAMPHETAMINE SULFATE AND AMPHETAMINE SULFATE 2.5; 2.5; 2.5; 2.5 MG/1; MG/1; MG/1; MG/1
20 CAPSULE, EXTENDED RELEASE ORAL DAILY
Status: DISCONTINUED | OUTPATIENT
Start: 2025-01-16 | End: 2025-01-18 | Stop reason: HOSPADM

## 2025-01-16 RX ORDER — ALBUTEROL SULFATE 0.83 MG/ML
5 SOLUTION RESPIRATORY (INHALATION) EVERY 6 HOURS PRN
Status: DISCONTINUED | OUTPATIENT
Start: 2025-01-16 | End: 2025-01-18 | Stop reason: HOSPADM

## 2025-01-16 RX ORDER — LAMOTRIGINE 100 MG/1
100 TABLET ORAL DAILY
Status: DISCONTINUED | OUTPATIENT
Start: 2025-01-17 | End: 2025-01-16

## 2025-01-16 RX ORDER — ACETAMINOPHEN 160 MG/5ML
650 SOLUTION ORAL EVERY 4 HOURS PRN
Status: DISCONTINUED | OUTPATIENT
Start: 2025-01-16 | End: 2025-01-18 | Stop reason: HOSPADM

## 2025-01-16 RX ORDER — MODAFINIL 100 MG/1
200 TABLET ORAL 2 TIMES DAILY
Status: DISCONTINUED | OUTPATIENT
Start: 2025-01-16 | End: 2025-01-18 | Stop reason: HOSPADM

## 2025-01-16 RX ORDER — SERTRALINE HYDROCHLORIDE 50 MG/1
50 TABLET, FILM COATED ORAL DAILY
COMMUNITY

## 2025-01-16 RX ORDER — SERTRALINE HYDROCHLORIDE 50 MG/1
50 TABLET, FILM COATED ORAL DAILY
Status: DISCONTINUED | OUTPATIENT
Start: 2025-01-16 | End: 2025-01-18 | Stop reason: HOSPADM

## 2025-01-16 RX ORDER — LAMOTRIGINE 100 MG/1
TABLET, ORALLY DISINTEGRATING ORAL
Qty: 95 TABLET | Refills: 0 | Status: SHIPPED | OUTPATIENT
Start: 2025-01-16 | End: 2025-01-16 | Stop reason: ALTCHOICE

## 2025-01-16 RX ORDER — LAMOTRIGINE 25 MG/1
50 TABLET ORAL ONCE
Status: COMPLETED | OUTPATIENT
Start: 2025-01-16 | End: 2025-01-16

## 2025-01-16 RX ORDER — FERROUS SULFATE 325(65) MG
65 TABLET ORAL
Status: DISCONTINUED | OUTPATIENT
Start: 2025-01-16 | End: 2025-01-18 | Stop reason: HOSPADM

## 2025-01-16 RX ADMIN — LEVETIRACETAM 750 MG: 250 TABLET, FILM COATED ORAL at 21:55

## 2025-01-16 RX ADMIN — ENOXAPARIN SODIUM 40 MG: 100 INJECTION SUBCUTANEOUS at 08:11

## 2025-01-16 RX ADMIN — LAMOTRIGINE 100 MG: 100 TABLET ORAL at 05:06

## 2025-01-16 RX ADMIN — SODIUM CHLORIDE 1000 ML: 9 INJECTION, SOLUTION INTRAVENOUS at 01:27

## 2025-01-16 RX ADMIN — DEXTROAMPHETAMINE SACCHARATE, AMPHETAMINE ASPARTATE, DEXTROAMPHETAMINE SULFATE AND AMPHETAMINE SULFATE 10 MG: 2.5; 2.5; 2.5; 2.5 TABLET ORAL at 12:56

## 2025-01-16 RX ADMIN — LEVETIRACETAM 750 MG: 500 TABLET, FILM COATED ORAL at 05:06

## 2025-01-16 RX ADMIN — LAMOTRIGINE 50 MG: 25 TABLET ORAL at 18:34

## 2025-01-16 RX ADMIN — DEXTROAMPHETAMINE SACCHARATE, AMPHETAMINE ASPARTATE, DEXTROAMPHETAMINE SULFATE AND AMPHETAMINE SULFATE 10 MG: 2.5; 2.5; 2.5; 2.5 TABLET ORAL at 08:05

## 2025-01-16 RX ADMIN — GABAPENTIN 300 MG: 300 CAPSULE ORAL at 21:55

## 2025-01-16 RX ADMIN — FERROUS SULFATE TAB 325 MG (65 MG ELEMENTAL FE) 325 MG: 325 (65 FE) TAB at 08:05

## 2025-01-16 RX ADMIN — MODAFINIL 200 MG: 100 TABLET ORAL at 12:50

## 2025-01-16 RX ADMIN — ONDANSETRON 4 MG: 2 INJECTION INTRAMUSCULAR; INTRAVENOUS at 01:27

## 2025-01-16 RX ADMIN — DEXTROAMPHETAMINE SACCHARATE, AMPHETAMINE ASPARTATE MONOHYDRATE, DEXTROAMPHETAMINE SULFATE AND AMPHETAMINE SULFATE 20 MG: 2.5; 2.5; 2.5; 2.5 CAPSULE, EXTENDED RELEASE ORAL at 08:05

## 2025-01-16 RX ADMIN — FLUTICASONE FUROATE AND VILANTEROL TRIFENATATE 1 PUFF: 200; 25 POWDER RESPIRATORY (INHALATION) at 08:05

## 2025-01-16 ASSESSMENT — PAIN SCALES - GENERAL
PAINLEVEL_OUTOF10: 0 - NO PAIN
PAINLEVEL_OUTOF10: 2

## 2025-01-16 ASSESSMENT — PAIN - FUNCTIONAL ASSESSMENT
PAIN_FUNCTIONAL_ASSESSMENT: 0-10

## 2025-01-16 ASSESSMENT — COGNITIVE AND FUNCTIONAL STATUS - GENERAL
MOBILITY SCORE: 24
DAILY ACTIVITIY SCORE: 24

## 2025-01-16 ASSESSMENT — LIFESTYLE VARIABLES
EVER FELT BAD OR GUILTY ABOUT YOUR DRINKING: NO
EVER HAD A DRINK FIRST THING IN THE MORNING TO STEADY YOUR NERVES TO GET RID OF A HANGOVER: NO
HAVE YOU EVER FELT YOU SHOULD CUT DOWN ON YOUR DRINKING: NO
HAVE PEOPLE ANNOYED YOU BY CRITICIZING YOUR DRINKING: NO
TOTAL SCORE: 0

## 2025-01-16 ASSESSMENT — ACTIVITIES OF DAILY LIVING (ADL): LACK_OF_TRANSPORTATION: NO

## 2025-01-16 NOTE — SIGNIFICANT EVENT
"Epilepsy - Brief Progress Note    Lisa Ramirez \"Spenser\" is a 19-year-old gender-queer patient (uses \"he/they\", AFAB) with PMH notable for asthma, fibromyalgia, depression, idiopathic hypersomnia vs narcolepsy, and paroxysmal events presenting with breakthrough seizure.    Patient was witnessed to have an left-sided clonic activity followed by a GTC lasting ~8 minutes with event consistent with prior. Currently at neurologic baseline but complaining of generalized muscle soreness. LTG level obtained in the ED found to be <0.1. Given levetiracetam 750 mg and lamotrigine 150 mg. Admitted to Neurology for epilepsy workup with cvEEG, MRI brain w/o contrast, and ASM titration.     Updates (1/16)  - EEG, MRI brain w/o contrast pending  - Start lamotrigine 75 mg BID -> Give 50 mg on 1/16 PM     4D Classification of Paroxysmal Events  Type: Unspecified Paroxysmal Events  Semiology: Olfactory Aura -> Sensory aura -> LUE clonic (D) -> LLE clonic -> GTC  EZ: Unknown  Etiology: Unknown  Comorbidities: Depression, fibromyalgia  Add'l Features:     - Lateralizing Signs: NA     - Diagnostic Signs: NA     - Event Triggers: NA  Onset/Frequency: 12-years-old, occurs once \"every few months\"  AEDs: Current - LTG (mood-dose), (GBP)  Prior- UNK    #Breakthrough Seizure  #C/f Epilepsy, mesial temporal  : : Home lamotrigine 100 mg daily and gabapentin 300 mg daily  : : Received levetiracetam 750 mg in the ED prior to admission  - Start lamotrigine 75 mg BID -> Give 50 mg on 1/16 PM  - Continue levetiracetam 750 mg BID  - MRI brain w/o contrast (seizure protocol)  - cvEEG  - Start lorazepam 2 mg IV PRN for motor seizure lasting more >3 minutes or a cluster of 3 or more motor seizures in an 8 hour period     #Hypersomnia vs Narcolepsy  - Continue home amphetamine-dextroamphetamine 10 mg BID  - Continue home modafinil 200 mg BID     #Depression  #Fibromyalgia  - Hold home bupropion given risk of lowering seizure threshold  - Continue " gabapentin 300 mg daily     #Asthma  - Continue home inhalers     F: PRN  E: PRN  N: Regular  A: PIV  GI: None  DVT: Enoxaparin    Code Status: Full Code  Surrogate Decision-Maker: Roommate (David Levi [phone])    Anthony Lopez MD  PGY-1, Neurology  Epilepsy: [id number]

## 2025-01-16 NOTE — PROGRESS NOTES
"Pharmacy Medication History Review    Lisa Ramirez \"Alf" is a 19 y.o. adult admitted for Seizure (Multi). Pharmacy reviewed the patient's lxvxn-am-tmcjwnodi medications and allergies for accuracy.    Medications ADDED:  Sertraline  Medications CHANGED:  Tylenol  Adderall  Symbicort   Wellbutrin  Advair Diskus   Ibuprofen   Atrovent  Medications REMOVED/NO LONGER TAKING:   Slynd  Ferrous Sulfate      The list below reflects the updated PTA list.   Prior to Admission Medications   Prescriptions Last Dose Informant   acetaminophen (Tylenol) 325 mg tablet Not Taking Self   Sig: Take 2 tablets (650 mg) by mouth every 4 hours as needed for fever   Patient not taking: Reported on 2025   acetaminophen (Tylenol) 500 mg tablet Past Month Self   Sig: Take 2 tablets (1,000 mg) by mouth every 6 hours if needed for mild pain (1 - 3).   albuterol 2.5 mg/0.5 mL nebulizer solution Past Month Self   Sig: USE ONE DOSE EVERY 4 HOURS AS NEEDED FOR SHORTNESS OF BREATH   albuterol 90 mcg/actuation inhaler Past Week Self   Sig: Inhale 2 puffs every 4 hours if needed.   amphetamine-dextroamphetamine (AdderalL) 10 mg tablet 1/15/2025 Evening Self   Sig: Take 1 tablet (10 mg) by mouth 2 times a day.   Patient taking differently: Take 1 tablet (10 mg) by mouth 3 times a day.   amphetamine-dextroamphetamine XR (Adderall XR) 10 mg 24 hr capsule 1/15/2025 Morning Self   Sig: Take 2 capsules (20 mg) by mouth once daily in the morning. Do not crush or chew.   buPROPion XL (Wellbutrin XL) 150 mg 24 hr tablet 1/15/2025 Morning Self   Si TABLET (150 MG) ORALLY DAILY IN THE MORNING - TAKE WITH 300MG PILL = 450MG DAILY   Patient taking differently: Take 3 tablets (450 mg) by mouth once daily.   buPROPion XL (Wellbutrin XL) 300 mg 24 hr tablet Not Taking Self   Sig: TAKE 1 TABLET (300 MG) BY MOUTH ONCE DAILY IN THE MORNING.   Patient not taking: Reported on 2025   budesonide-formoteroL (Symbicort) 160-4.5 mcg/actuation inhaler Past " Month Self   Sig: Inhale twice a day.   Patient taking differently: Inhale 1 puff 2 times a day as needed.   budesonide-formoteroL (Symbicort) 160-4.5 mcg/actuation inhaler Not Taking Self   Sig: Inhale 1 Puff as instructed two times a day.   Patient not taking: Reported on 2025   fluticasone propion-salmeteroL (Advair Diskus) 250-50 mcg/dose diskus inhaler Past Month Self   Sig: Inhale 1 puff 2 times a day. Rinse mouth with water after use to reduce aftertaste and incidence of candidiasis. Do not swallow.   Patient taking differently: Inhale 1 puff 2 times a day as needed. Rinse mouth with water after use to reduce aftertaste and incidence of candidiasis. Do not swallow.   gabapentin (Neurontin) 300 mg capsule 1/15/2025 Bedtime Self   Sig: Take 1 capsule (300 mg) by mouth once daily at bedtime.   humidifiers misc  Self   Si Units once daily as needed (for shortness of breath, wheezing).   ibuprofen 200 mg tablet Past Week Self   Sig: Take 4 tablets (800 mg) by mouth every 8 hours if needed for mild pain (1 - 3).   ibuprofen 600 mg tablet Not Taking Self   Sig: Take 1 tablet (600 mg) by mouth every 8 hours if needed for mild pain (1 - 3) or fever (temp greater than 38.0 C).   Patient not taking: Reported on 2025   ipratropium (Atrovent) 17 mcg/actuation inhaler Past Month Self   Sig: Inhale 1 puff once daily as needed for shortness of breath or wheezing.   lamoTRIgine (LaMICtal) 100 mg tablet     Sig: Take 1 tablet (100 mg) by mouth once daily.   modafinil (Provigil) 200 mg tablet 1/15/2025 Evening Self   Sig: Take 1 tablet (200 mg) by mouth 2 times a day.   sertraline (Zoloft) 50 mg tablet 1/15/2025 Morning Self   Sig: Take 1 tablet (50 mg) by mouth once daily.      Facility-Administered Medications: None        The list below reflects the updated allergy list. Please review each documented allergy for additional clarification and justification.  Allergies  Reviewed by Breanna Phoenix on 2025  "  No Known Allergies         Patient accepts M2B at discharge.     Sources:   [facility], [facility]  Pharmacy dispense history  Patient interview Good historian  Chart Review     Additional Comments:  Pt was able to tell me when they last took their meds at home.       DERRICK MADDOX [place]  Pharmacy Technician  01/16/25     Secure Chat preferred   If no response call [phone] or Darby Smart \"Med Rec\"   "

## 2025-01-16 NOTE — PROGRESS NOTES
SW made referral to [facility] to follow up for resources for food, transportation and utilities.    MADELINE MercerW

## 2025-01-16 NOTE — DISCHARGE INSTRUCTIONS
Dear Spenser,    You were admitted to the hospital after you experienced an episode of shaking most consistent with a seizure. You were recommended to undergo electroencephalography (EEG) and an MRI scan of your brain. You preferred to have the EEG competed at a later date, so a referral will be placed to our Epilepsy Monitoring Unit (EMU) for you to schedule at your earliest convenience. Based on the description of your seizures, the source of your seizure is expected in the your brain's right hemisphere. Since your MRI does not clearly explain a cause for your seizures, further testing during your EMU admission will be important to solidify your diagnosis and possibly identify a cause.    To better manage your seizures, you are prescribed lamotrigine (Lamictal) and levetiracetam (Keppra). Please take Keppra 750 mg twice daily. Your lamotrigine dose will gradually be increased according to the following schedule: 75 mg twice daily for two weeks, followed by 100 mg twice daily starting January 31 and continue at this dose until you follow up with your neurologist, Dr. Aleman.     Thank you for the opportunity to care for you here at [facility].    Sincerely,  [facility]rology Team

## 2025-01-16 NOTE — ED TRIAGE NOTES
Pt. Arrives to ED via EMS for witnessed seizure. Per EMS, it is reported the patients friends witnessed the event. They state the patient was seizing for approximately 3-5 mins. It is unknown if pt hit her head during seizure. Pt. Reports a history of seizure disorder and states she is compliant taking her Lamictal and Gabapentin. Pt. Reports recently starting a medication for narcolepsy. This medication causes her not to be hungry and she admits she has not really been eating or drinking fluids. Pt does believe she bit her mouth during the seizure and there was a loss of bladder. Pt also describes an aura prior to the seizure. Pt denies any cold or flu like symptoms. Denies any fevers.

## 2025-01-16 NOTE — ED PROVIDER NOTES
Emergency Department Provider Note        History of Present Illness     History provided by: Patient  Limitations to History:  Does not recall events due to seizure  External Records Reviewed with Brief Summary:  Neurology note on 11/06/2024 for breakthrough seizure suspected to be secondary to stress and increase in her bupropion dose    HPI:  Lisa Ramirez is a 19 y.o. adult with seizures, narcolepsy, depression, asthma presenting for a seizure.  Patient's was in her dorm study area when she had a seizure.  She felt her typical aura, which is smelling a burning smell and dissociating.  Her next memory is being in the emergency department.  She feels tired now and her muscles are sore, and reports nausea but no abdominal pain.  She has not been feeling sick recently, such as cough, congestion, short of breath, chest pains, diarrhea, dysuria, fevers or chills.  She has not been getting good sleep, and feels that the amount she gets is not good quality.  Additionally, she is under stress because of the new school semester and she has her MCAT coming up.  Her last breakthrough seizure was over 0 to break, approximately 1 month ago.  Her most recent medication change was starting modafinil in the middle of last semester after she was diagnosed with narcolepsy.  She takes lamotrigine and gabapentin for her seizures.  Has not missed any doses of her medications.  She is also on bupropion for depression.  She had a breakthrough seizure several months ago after she increased her dose for her depression.  She states that she went back to her original dose after that seizure.  Her neurologist is Dr. Isbell.  Her friends arrived at bedside and they are able to provide additional information.  They estimate that the seizure lasted 8 to 10 minutes.  She did not hit her head.    Physical Exam   Triage vitals:  T 36.7 °C (98 °F)  HR 97  /79  RR 16  O2 97 % None (Room air)    Physical Exam  Vitals and nursing  note reviewed.   Constitutional:       General: He is not in acute distress.     Appearance: He is well-developed.   HENT:      Head: Normocephalic and atraumatic.      Right Ear: External ear normal.      Left Ear: External ear normal.      Nose: Nose normal.   Eyes:      General: No scleral icterus.     Conjunctiva/sclera: Conjunctivae normal.      Pupils: Pupils are equal, round, and reactive to light.   Cardiovascular:      Rate and Rhythm: Normal rate and regular rhythm.      Heart sounds: No murmur heard.  Pulmonary:      Effort: Pulmonary effort is normal. No respiratory distress.      Breath sounds: Normal breath sounds.   Abdominal:      Palpations: Abdomen is soft.      Tenderness: There is no abdominal tenderness.   Musculoskeletal:         General: Tenderness present. No swelling.      Cervical back: Neck supple. No rigidity.      Comments: Reported myalgias throughout her body, but no bony or joint tenderness, and has full range of motion.  No tenderness of the neck or back.  No step-offs, deformities, injuries.   Skin:     General: Skin is warm and dry.   Neurological:      General: No focal deficit present.      Mental Status: He is alert and oriented to person, place, and time.      Cranial Nerves: No cranial nerve deficit.      Sensory: No sensory deficit.      Motor: No weakness.      Comments: Appears fatigued but oriented.   Psychiatric:         Mood and Affect: Mood normal.          Medical Decision Making & ED Course   Medical Decision Makin y.o. adult presenting for breakthrough seizure.  On arrival, she is awake and alert, oriented but reports fatigue, myalgias, and nausea.  She has full range of motion of her extremities and no joint pains.  No abdominal pain, chest pain, headache, neck or back pain.  No obvious injuries on evaluation.  Friend states that she did not hit her head.  Suspect that her myalgias are secondary to the seizures.  Will continue to monitor.  Patient given Zofran  and IV fluids.  She is mildly tachycardic but hemodynamically stable.  We will obtain lab work to evaluate for metabolic causes of her breakthrough seizure, but she does report that she has been under stress from college and her upcoming MCAT, as well as getting poor sleep.    CBC is negative for leukocytosis.  Chemistry panel unremarkable.  Lamotrigine level subtherapeutic.  She has remained at baseline without any additional seizures here.  We discussed with neurology who evaluated the patient at bedside.  They would like to admit the patient for further workup.  However, the patient was hesitant as she was concerned about being able to pay for her hospitalization.  After checking with registration, the patient does have insurance and  is in network for her.  She consider this but decided that she wanted to still leave.  Thus, neurology recommended administering a dose of Keppra and lamotrigine, and start her on a lamotrigine taper and start Keppra outpatient.  Patient received the Keppra and lamotrigine here, but changed her mind soon afterwards and wanted to be admitted for further management.  This was discussed with neurology and patient is admitted to the neurology service.    Smooth Barber DO, PGY 4  Emergency Medicine Resident  ----    Social Determinants of Health which Significantly Impact Care: None identified     EKG Independent Interpretation: EKG not obtained    Independent Result Review and Interpretation: Relevant laboratory and radiographic results were reviewed and independently interpreted by myself.  As necessary, they are commented on in the ED Course.    Chronic conditions affecting the patient's care: As documented above in [facility]    The patient was discussed with the following consultants/services:  Neurology    Care Considerations: As documented above in [facility]    ED Course:  Diagnoses as of 01/16/25 0642   Seizure (Multi)     Disposition   As a result of their workup, the patient will require  admission to the hospital.  The patient was informed of his diagnosis.  The patient was given the opportunity to ask questions and I answered them. The patient agreed to be admitted to the hospital.    Procedures   Procedures    Patient seen and discussed with ED attending physician.    Smooth Barber DO  Emergency Medicine     Smooth Barber DO  Resident  01/16/25 0677

## 2025-01-16 NOTE — HOSPITAL COURSE
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     Patient was witnessed to have an left-sided clonic activity followed by a GTC lasting ~8 minutes with event consistent with prior. Currently at neurologic baseline but complaining of generalized muscle soreness. LTG level obtained in the ED found to be <0.1. Started on levetiracetam 750 mg and lamotrigine 150 mg. Admitted to Neurology for epilepsy workup with cvEEG, MRI brain w/o contrast, and ASM titration.    1/16: Admitted to Epilepsy. Patient deferred EEG to afternoon. Given lamotrigine 50 mg in PM for total daily dose 150 mg. Plan to continue 75 mg BID and follow up titration schedule. MRI brain w/o contrast pending.    1/17: EEG did not happen overnight. MRI brain epilepsy protocol performed. On team's review, there were not obvious R hemispheric abnormalities (specifically, no obvious cortical abnormalities in R mesial temporal lobe or insula. Final radiology report: slightly thickened left  hippocampal body and tail with T2/FLAIR hyperintensity.     Per discussion with patient, they are agreeable to an EMU admission in late May 2025 after their academic school year.    Patient connected to EEG on 1/17, which showed no epileptiform activity.    To Do:   - start Keppra 750 mg BID  - Lamotrigine 75 mg BID x 2 weeks, then increase to 100 mg BID  - neurology follow up with Dr. Aleman  - EMU admission (order placed prior to discharge)  "

## 2025-01-16 NOTE — PROGRESS NOTES
01/16/25 1524   Discharge Planning   Living Arrangements Alone   Support Systems Friends/neighbors   Assistance Needed Prior to admission, no assistance needed.   Type of Residence Other (Comment)   Do you have animals or pets at home? Yes   Type of Animals or Pets 1 Cat.   Who is requesting discharge planning? Provider   Home or Post Acute Services None   Expected Discharge Disposition Home   Does the patient need discharge transport arranged? No  (stated she'll walk home or her friend will provide transport.)   Financial Resource Strain   How hard is it for you to pay for the very basics like food, housing, medical care, and heating? Somewhat   Transportation Needs   In the past 12 months, has lack of transportation kept you from medical appointments or from getting medications? no   In the past 12 months, has lack of transportation kept you from meetings, work, or from getting things needed for daily living? No     Met with pt and introduced myself as Care Coordinator with Care Transitions Team for Discharge Planning. Pt  feels safe at home. Pt typically walks to the grocery store. Pt's address, phone number and contact information was verified. Pt requesting to speak with a SW regarding issues with paying bills, buying food and transportation, SW notified, to consult the CHWs to assist.   Discharge disposition: home   DME: crutches at bedside  PCP: Omayra Buitrago  Pharmacy: [facility].     Transitional Care Coordination Progress Note:  Plan per Medical/Surgical team:  Presented with a breakthrough seizure, possible EMG.   Discharge Disposition: home  Potential Barriers: none  ADOD: 1/17    Marissa Taylor RN, BSN  Transitional Care Coordinator  Office: [phone]  Secure chat via [employer]

## 2025-01-16 NOTE — CARE PLAN
The patient's goals for the shift include pt will remain comfortable throughout shift     The clinical goals for the shift include pt will remain safe throughout shift       Problem: Pain - Adult  Goal: Verbalizes/displays adequate comfort level or baseline comfort level  Outcome: Progressing     Problem: Safety - Adult  Goal: Free from fall injury  Outcome: Progressing     Problem: Discharge Planning  Goal: Discharge to home or other facility with appropriate resources  Outcome: Progressing     Problem: Chronic Conditions and Co-morbidities  Goal: Patient's chronic conditions and co-morbidity symptoms are monitored and maintained or improved  Outcome: Progressing

## 2025-01-16 NOTE — H&P
"History Of Present Illness  Lisa Ramirez \"Spenser\" is a 19 y.o. 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     Patient reports having his typical seizure starting with his usual olfactory aura (burning smell) followed by left arm sensory changes. After this he has no memory of the event until he is in EMS. Per witnesses, patient had LUE stiffening that progressed into LUE jerking, followed by LLE stiffening and jerking that then generalized into a GTC with abnormal breathing and foaming at the mouth. Event in total lasted ~8 minutes. After which had urinary incontinence, lip biting and tongue biting.     Has been following with Sleep medicine to address hypersomnia and was started on Adderall in addition to Modafinil. Is also taking Wellbutrin for mood.      Is currently compliant with her home LMG 100mg daily.     Previous Neurologic History (Pasted from prior outpatient visit)  Briefly, the patient was first seen in neurology clinic on 1/31/24. At that time, they endorsed both sleepiness (main reason for consult) and paroxysmal events. Regarding sleepiness, this was described as longstanding since starting college-level classes in high school and gradually progressed. Now manifesting as persistent daytime sleepiness with at least once-daily sleep attacks. Often has dreams initially with the LOC, even if they are awoken in several minutes by friends. No other stigmata of narcolepsy and endorsed poor sleep hygiene. Regarding paroxysmal events, patient endorsed episodes of foul smell followed by LOC and diffuse motor actions since childhood.     Overall diagnoses were consistent with Insufficient Sleep Syndrome with REM Intrusions (DDx narcolepsy) and unspecified paroxysmal events. Plan was to obtain MSLT, " "establish with sleep med (given no sleep attendings at  neuro clinic currently), and continue home  &  daily. Deferred possibility of LP for orexin levels given low-to-moderate narcolepsy concern, making MSLT the appropriate first step.        ANAMNESIS PER PATIENT  A day before, the patient will notice a little \"weird\" feeling. During the event, there will be a burning smell. Then loses consciousness and wakes up on the floor with all muscles hurting. Has had minor injuries. Usually has urinary incontinence and has once or twice awakened with blood in the mouth, but does not have stimata of tongue biting.     Events have never changed in character, frequency, or duration.     ANAMNESIS PER COLLATERAL  Unable to obtain.     RELATED COMORBIDITIES  Other Spell Types             Staring Spells? - yes. Big problem in middle and high school.             Arm/Leg Jerks? - maybe occasionally?              Lost blocks of time? - no             Events during sleep? - none     Epilepsy Risk Factors             Term birth - Yes             History of  hypoxia - no             History of birth complications - no             History of abnormal development or intellectual disability - no             History of CNS infection (meningitis/encephalitis) - no              History of febrile seizures - no             History of moderate/severe head trauma - several mild concussions             History of stroke/ICH - no             Family history of seizures - no             History of drug / alcohol use - maybe occasional marijuana??             History of neurosurgical procedure - no     ADDITIONAL INFORMATION  Anti-Epileptic Drug (AED) History             Compliant: yes             Current AEDs: LMT 100mg Qday; GBP 300mg QHS             Current medication side effects: none             AEDs previously used and reasons for discontinuation: none     Quality of Life:             Driving: none             " Working: Case Student- [employer]             Living with: self - studio apartment.    Past Medical History  Past Medical History:   Diagnosis Date    Asthma     Exercise induced bronchospasm ([facility])     Asthma, exercise induced    Personal history of diseases of the blood and blood-forming organs and certain disorders involving the immune mechanism     History of iron deficiency anemia    Personal history of other mental and behavioral disorders     History of depression    Personal history of other mental and behavioral disorders     History of OCD (obsessive compulsive disorder)    Personal history of other specified conditions     History of pseudoseizure     Surgical History  No past surgical history on file.  Social History  Social History     Tobacco Use    Smoking status: Never    Smokeless tobacco: Never   Vaping Use    Vaping status: Never Used   Substance Use Topics    Alcohol use: Yes    Drug use: Yes     Types: Marijuana     Comment: just a little     Allergies  Patient has no known allergies.  (Not in a hospital admission)      Review of Systems  Neurological Exam  Mental Status  Awake, alert and oriented to person, place and time. Speech is normal. Language is fluent with no aphasia. Attention and concentration are normal. Fund of knowledge is appropriate for level of education.    Cranial Nerves  CN II: Visual fields full to confrontation.  CN III, IV, VI: No nystagmus. Normal smooth pursuit. Pupils equal round and reactive to light bilaterally.  CN V: Facial sensation is normal.  CN VII: Full and symmetric facial movement.  CN VIII: Hearing is normal.  CN IX, X: Palate elevates symmetrically  CN XI: Shoulder shrug strength is normal.  CN XII: Tongue midline without atrophy or fasciculations.    Motor  Normal muscle bulk throughout. No fasciculations present. Normal muscle tone. No abnormal involuntary movements.                                               Right                     Left   Shoulder  "abduction               5                          5   Shoulder adduction               5                          5  Elbow flexion                         5                          5  Elbow extension                    5                          5  Wrist flexion                           5                          5  Wrist extension                      5                          5  Hip flexion                              5                          5  Knee flexion                           5                          5  Knee extension                      5                          5  Plantarflexion                         5                          5  Dorsiflexion                            5                          5    Sensory  Light touch is normal in upper and lower extremities. Pinprick is normal in upper and lower extremities. Temperature is normal in upper and lower extremities.     Reflexes  Deep tendon reflexes are 2+ and symmetric in all four extremities.    Coordination  Right: Finger-to-nose normal. Heel-to-shin normal.Left: Finger-to-nose normal. Heel-to-shin normal.    Gait  Casual gait is normal including stance, stride, and arm swing.    Physical Exam  Eyes:      Extraocular Movements: No nystagmus.      Pupils: Pupils are equal, round, and reactive to light.   Neurological:      Deep Tendon Reflexes: Reflexes are normal and symmetric.   Psychiatric:         Speech: Speech normal.       Last Recorded Vitals  Blood pressure 125/79, pulse 89, temperature 36.7 °C (98 °F), temperature source Oral, resp. rate 11, height 1.676 m (5' 6\"), weight 94.3 kg (208 lb), SpO2 97%.    Relevant Results                    [place] Coma Scale  Best Eye Response: Spontaneous  Best Verbal Response: Oriented  Best Motor Response: Follows commands  [place] Coma Scale Score: 15                    Assessment/Plan   Assessment & Plan      Lisa Ramirez \"Spenser\" is a 19 y.o. gender-queer patient (preferred pronouns " "\"he/they;\" assigned female sex at birth) adult with a history notable for asthma, fibromyalgia, depression, idiopathic hypersomnia vs. Narcolepsy and paroxysmal events  presenting with breakthrough seizure.      Patient was witnessed to have an left sided clonic activity followed by a GTC lasting ~8 minutes with event consistent with prior. Currently at neurologic baseline but complaining of generalized muscle soreness. LTG level obtained in the ED found to be <0.1.    Patient initially had declined admission gien concerns for costs and was given Keppra 750mg and dose of 150mg of LTG. Afterwhich, he had changed his mind and agreed for admission for further workup of epilepsy. Thus will admit to neurology to complete epilepsy workup with cvEEG, MRI brain and titration of ASM.       4D Classification of Paroxysmal Events  Type: Unspecified Paroxysmal Events  Semiology: Olfactory Aura -> Sensory aura -> LUE clonic (D) -> LLE clonic -> GTC  EZ: unknown  Etiology: unknown  Comorbidities: depression, fibromyalgia  Add'l Features:     - Lateralizing Signs: NA     - Diagnostic Signs: NA     - Event Triggers: NA  Onset/Frequency: 11yo / Every few months  AEDs: Current - LTG (mood-dose), (GBP)  Prior- UNK          #Breakthrough seizure  #C/f mesial temporal epilepsy  :: on home LTG 100mg daily & Gabapentin 300mg daily  :: received Keppra 750mg in the ED prior to admission  - Continue LTG 100mg daily. Will discuss uptitration of LTG to a more therapeutic dose.  - Start Keppra 750mg bid  - MRI brain (seizure protocol)  - cvEEG  - Ativan 2 mg IV for prolonged motor seizure lasting more than 3 minutes or a cluster of 3 or more motor seizures in an 8 hour period.    #Hypersomnia vs Narcolepsy  - Continue home Adderrall and Modafinil     #Depression  #Fibromyalgia  - Hold home Wellbutrin given seizure threshold lowering risk  - Continue Gabapentin 300mg daily    #Asthma  - continue home inhalers     Zaheer Cordova" DO  Neurology, PGY-2

## 2025-01-16 NOTE — LETTER
January 18, 2025     Patient: Spenser Ramirez   YOB: 2005   Date of Visit: 1/16/2025       To Whom It May Concern:    Spenser Ramirez was admitted to the hospital from 1/16/2025 to 1/18/2025 . Please excuse Spenser for his absence from classes and any school-related activities on these days.    If you have any questions or concerns, please don't hesitate to contact our team.         Sincerely,     Anthony Lopez MD        CC: No Recipients

## 2025-01-17 ENCOUNTER — PHARMACY VISIT (OUTPATIENT)
Dept: PHARMACY | Facility: CLINIC | Age: 20
End: 2025-01-17
Payer: COMMERCIAL

## 2025-01-17 ENCOUNTER — APPOINTMENT (OUTPATIENT)
Dept: NEUROLOGY | Facility: HOSPITAL | Age: 20
End: 2025-01-17
Payer: COMMERCIAL

## 2025-01-17 ENCOUNTER — APPOINTMENT (OUTPATIENT)
Dept: RADIOLOGY | Facility: HOSPITAL | Age: 20
End: 2025-01-17
Payer: COMMERCIAL

## 2025-01-17 LAB
HOLD SPECIMEN: NORMAL
LAMOTRIGINE SERPL-MCNC: 2.2 UG/ML (ref 2.5–15)

## 2025-01-17 PROCEDURE — 2500000004 HC RX 250 GENERAL PHARMACY W/ HCPCS (ALT 636 FOR OP/ED): Performed by: STUDENT IN AN ORGANIZED HEALTH CARE EDUCATION/TRAINING PROGRAM

## 2025-01-17 PROCEDURE — 2500000001 HC RX 250 WO HCPCS SELF ADMINISTERED DRUGS (ALT 637 FOR MEDICARE OP)

## 2025-01-17 PROCEDURE — RXMED WILLOW AMBULATORY MEDICATION CHARGE

## 2025-01-17 PROCEDURE — 2500000002 HC RX 250 W HCPCS SELF ADMINISTERED DRUGS (ALT 637 FOR MEDICARE OP, ALT 636 FOR OP/ED)

## 2025-01-17 PROCEDURE — 36415 COLL VENOUS BLD VENIPUNCTURE: CPT | Performed by: STUDENT IN AN ORGANIZED HEALTH CARE EDUCATION/TRAINING PROGRAM

## 2025-01-17 PROCEDURE — 2500000001 HC RX 250 WO HCPCS SELF ADMINISTERED DRUGS (ALT 637 FOR MEDICARE OP): Performed by: STUDENT IN AN ORGANIZED HEALTH CARE EDUCATION/TRAINING PROGRAM

## 2025-01-17 PROCEDURE — 99232 SBSQ HOSP IP/OBS MODERATE 35: CPT

## 2025-01-17 PROCEDURE — 70551 MRI BRAIN STEM W/O DYE: CPT

## 2025-01-17 PROCEDURE — 96376 TX/PRO/DX INJ SAME DRUG ADON: CPT

## 2025-01-17 PROCEDURE — G0378 HOSPITAL OBSERVATION PER HR: HCPCS

## 2025-01-17 PROCEDURE — 80175 DRUG SCREEN QUAN LAMOTRIGINE: CPT | Performed by: STUDENT IN AN ORGANIZED HEALTH CARE EDUCATION/TRAINING PROGRAM

## 2025-01-17 PROCEDURE — 96372 THER/PROPH/DIAG INJ SC/IM: CPT | Mod: 59 | Performed by: STUDENT IN AN ORGANIZED HEALTH CARE EDUCATION/TRAINING PROGRAM

## 2025-01-17 PROCEDURE — 70551 MRI BRAIN STEM W/O DYE: CPT | Performed by: RADIOLOGY

## 2025-01-17 PROCEDURE — 2500000004 HC RX 250 GENERAL PHARMACY W/ HCPCS (ALT 636 FOR OP/ED)

## 2025-01-17 RX ORDER — LAMOTRIGINE 25 MG/1
TABLET ORAL
Qty: 212 TABLET | Refills: 0 | Status: SHIPPED | OUTPATIENT
Start: 2025-01-17 | End: 2025-02-14

## 2025-01-17 RX ORDER — ONDANSETRON 4 MG/1
4 TABLET, ORALLY DISINTEGRATING ORAL ONCE
Status: COMPLETED | OUTPATIENT
Start: 2025-01-17 | End: 2025-01-17

## 2025-01-17 RX ORDER — WATER
1000 LIQUID (ML) MISCELLANEOUS ONCE
Status: DISCONTINUED | OUTPATIENT
Start: 2025-01-17 | End: 2025-01-18 | Stop reason: HOSPADM

## 2025-01-17 RX ORDER — LAMOTRIGINE 100 MG/1
100 TABLET ORAL 2 TIMES DAILY
Qty: 60 TABLET | Refills: 11 | Status: SHIPPED | OUTPATIENT
Start: 2025-01-17 | End: 2025-01-17

## 2025-01-17 RX ORDER — ONDANSETRON HYDROCHLORIDE 2 MG/ML
4 INJECTION, SOLUTION INTRAVENOUS ONCE
Status: COMPLETED | OUTPATIENT
Start: 2025-01-17 | End: 2025-01-17

## 2025-01-17 RX ORDER — BUPROPION HYDROCHLORIDE 150 MG/1
450 TABLET ORAL DAILY
Start: 2025-01-17

## 2025-01-17 RX ORDER — LEVETIRACETAM 750 MG/1
750 TABLET ORAL 2 TIMES DAILY
Qty: 60 TABLET | Refills: 1 | Status: SHIPPED | OUTPATIENT
Start: 2025-01-17 | End: 2025-01-20

## 2025-01-17 RX ORDER — LAMOTRIGINE 25 MG/1
TABLET ORAL
Qty: 212 TABLET | Refills: 0 | Status: SHIPPED | OUTPATIENT
Start: 2025-01-17 | End: 2025-01-17

## 2025-01-17 RX ORDER — IBUPROFEN 600 MG/1
600 TABLET ORAL ONCE
Status: COMPLETED | OUTPATIENT
Start: 2025-01-17 | End: 2025-01-17

## 2025-01-17 RX ORDER — FERROUS SULFATE 325(65) MG
325 TABLET ORAL
Start: 2025-01-17

## 2025-01-17 RX ORDER — LAMOTRIGINE 100 MG/1
100 TABLET ORAL 2 TIMES DAILY
Qty: 60 TABLET | Refills: 11 | Status: SHIPPED | OUTPATIENT
Start: 2025-02-14

## 2025-01-17 RX ORDER — LAMOTRIGINE 25 MG/1
TABLET ORAL
Qty: 212 TABLET | Refills: 0 | Status: CANCELLED | OUTPATIENT
Start: 2025-01-17 | End: 2025-02-14

## 2025-01-17 RX ORDER — LAMOTRIGINE 100 MG/1
100 TABLET ORAL 2 TIMES DAILY
Qty: 60 TABLET | Refills: 11 | Status: SHIPPED | OUTPATIENT
Start: 2025-03-01 | End: 2025-01-17

## 2025-01-17 RX ADMIN — FERROUS SULFATE TAB 325 MG (65 MG ELEMENTAL FE) 325 MG: 325 (65 FE) TAB at 06:11

## 2025-01-17 RX ADMIN — GABAPENTIN 300 MG: 300 CAPSULE ORAL at 21:22

## 2025-01-17 RX ADMIN — SERTRALINE 50 MG: 50 TABLET, FILM COATED ORAL at 08:43

## 2025-01-17 RX ADMIN — LEVETIRACETAM 750 MG: 250 TABLET, FILM COATED ORAL at 08:44

## 2025-01-17 RX ADMIN — ONDANSETRON 4 MG: 2 INJECTION INTRAMUSCULAR; INTRAVENOUS at 15:32

## 2025-01-17 RX ADMIN — ENOXAPARIN SODIUM 40 MG: 100 INJECTION SUBCUTANEOUS at 06:11

## 2025-01-17 RX ADMIN — LAMOTRIGINE 75 MG: 25 TABLET ORAL at 06:11

## 2025-01-17 RX ADMIN — DEXTROAMPHETAMINE SACCHARATE, AMPHETAMINE ASPARTATE MONOHYDRATE, DEXTROAMPHETAMINE SULFATE AND AMPHETAMINE SULFATE 20 MG: 2.5; 2.5; 2.5; 2.5 CAPSULE, EXTENDED RELEASE ORAL at 12:19

## 2025-01-17 RX ADMIN — MODAFINIL 200 MG: 100 TABLET ORAL at 12:19

## 2025-01-17 RX ADMIN — DEXTROAMPHETAMINE SACCHARATE, AMPHETAMINE ASPARTATE, DEXTROAMPHETAMINE SULFATE AND AMPHETAMINE SULFATE 10 MG: 2.5; 2.5; 2.5; 2.5 TABLET ORAL at 08:44

## 2025-01-17 RX ADMIN — IBUPROFEN 600 MG: 600 TABLET, FILM COATED ORAL at 15:30

## 2025-01-17 RX ADMIN — DEXTROAMPHETAMINE SACCHARATE, AMPHETAMINE ASPARTATE, DEXTROAMPHETAMINE SULFATE AND AMPHETAMINE SULFATE 10 MG: 2.5; 2.5; 2.5; 2.5 TABLET ORAL at 12:19

## 2025-01-17 RX ADMIN — LAMOTRIGINE 75 MG: 25 TABLET ORAL at 21:22

## 2025-01-17 RX ADMIN — ACETAMINOPHEN 650 MG: 325 TABLET ORAL at 08:44

## 2025-01-17 RX ADMIN — LEVETIRACETAM 750 MG: 250 TABLET, FILM COATED ORAL at 21:22

## 2025-01-17 ASSESSMENT — PAIN SCALES - GENERAL: PAINLEVEL_OUTOF10: 2

## 2025-01-17 NOTE — CARE PLAN
The patient's goals for the shift include rest    The clinical goals for the shift include safety      Problem: Pain - Adult  Goal: Verbalizes/displays adequate comfort level or baseline comfort level  Outcome: Met     Problem: Safety - Adult  Goal: Free from fall injury  Outcome: Progressing     Problem: Discharge Planning  Goal: Discharge to home or other facility with appropriate resources  Outcome: Progressing     Problem: Chronic Conditions and Co-morbidities  Goal: Patient's chronic conditions and co-morbidity symptoms are monitored and maintained or improved  Outcome: Progressing

## 2025-01-17 NOTE — PROGRESS NOTES
"Lisa Ramirez \"Alf" is a 19 y.o. adult on day 0 of admission presenting with Seizure (Multi).      Subjective   No acute events overnight. EEG was not connected as planned. Patient reports nausea and fatigue. Denies any other acute complaints.       Objective   Last Recorded Vitals  Blood pressure 112/77, pulse 83, temperature 36.4 °C (97.5 °F), temperature source Temporal, resp. rate 16, height 1.676 m (5' 6\"), weight 94.3 kg (208 lb), SpO2 97%.    GEN: In no acute distress, resting comfortably in bed  HEENT: Mucous membranes moist; eyes, ears, and nose without exudates; no scleral icterus  CV: Regular rate and rhythm; no murmurs, rubs, or gallops  RESP: Clear to auscultation bilaterally; no accessory muscle use; normal work of breathing  ABD: Soft, non-tender, non-distended; bowel sounds present  EXT: Radial pulses strong bilaterally; no peripheral edema  SKIN: Warm and dry; no visible rashes    MENTAL STATE:  Oriented to person, place, and date. Recent and remote memory intact. Normal attention span and concentration. Language testing was normal for comprehension, repetition, expression, and naming.    CRANIAL NERVES:   CN 2:  Visual fields full to confrontation.  CN 3, 4, 6: Pupils round, 4 mm in diameter, equally reactive to light. No ptosis. EOMs  normal alignment, full range with normal saccades, pursuit and   convergence. No nystagmus.  CN 5:  Facial sensation intact bilaterally.  CN 7:  Normal and symmetric facial strength. Nasolabial folds symmetric.  CN 8:  Hearing intact to conversation.  CN 9/10: Palate elevates symmetrically.  CN 11:  5/5 strength of bilateral shoulder shrug and neck turn.  CN 12:  Tongue midline, with normal bulk and strength; no fasciculations.     MOTOR:   Normal muscle bulk and tone in both upper and lower extremities.   No fasciculations, tremor or other abnormal movements were observed.       R L  Shoulder abduction 5 5  Elbow flexion  5 5  Elbow extension 5 5  Wrist " flexion  5 5  Wrist extension 5 5    Hip flexion  5 5  Knee flexion  5 5  Knee extension 5 5  Dorsiflexion  5 5  Plantarflexion  5 5    REFLEXES:      R L  BR   2 2  Biceps   2 2  Triceps   2 2  Knee   2 2  Ankle   2 2    Babinski: Toes downgoing to plantar stimulation. No clonus or other pathologic reflexes present.     SENSORY:   Intact to light touch in both upper and lower extremities.    COORDINATION:    Finger-to-nose intact without dysmetria or overshoot in bilateral upper extremities.  Heel-to-shin intact in bilateral lower extremities.    GAIT:   Not assessed.    Relevant Results  Scheduled medications  amphetamine-dextroamphetamine, 10 mg, oral, BID  amphetamine-dextroamphetamine XR, 20 mg, oral, Daily  [Held by provider] buPROPion XL, 150 mg, oral, Daily  [Held by provider] buPROPion XL, 300 mg, oral, q AM  enoxaparin, 40 mg, subcutaneous, q24h  ferrous sulfate (325 mg ferrous sulfate), 65 mg of iron, oral, Daily  fluticasone furoate-vilanteroL, 1 puff, inhalation, Daily  gabapentin, 300 mg, oral, Nightly  ibuprofen, 600 mg, oral, Once  lamoTRIgine, 75 mg, oral, BID  levETIRAcetam, 750 mg, oral, BID  modafinil, 200 mg, oral, BID  ondansetron ODT, 4 mg, oral, Once   Or  ondansetron, 4 mg, intravenous, Once  oral hydration, 1,000 mL, oral, Once  sertraline, 50 mg, oral, Daily    Continuous medications     PRN medications  PRN medications: acetaminophen **OR** acetaminophen, albuterol, LORazepam    Results from last 72 hours   Lab Units 01/16/25  0116   WBC AUTO x10*3/uL 10.1   HEMOGLOBIN g/dL 11.4*   PLATELETS AUTO x10*3/uL 275   SODIUM mmol/L 139   POTASSIUM mmol/L 3.3*   CO2 mmol/L 23   BUN mg/dL 14   GLUCOSE mg/dL 75      MRI brain w/o contrast (1/17/2025)  Slightly thickened left hippocampal body and tail with elevated T2 and FLAIR hyperintensity. These may reflect sequela of recent seizure activity. The other differential considerations such as sequela of acute ischemia, encephalitis/cerebritis are  "considered less likely.       Assessment/Plan      Assessment & Plan  Seizure (Multi)    Lisa Ramirez \"Spenser\" is a 19-year-old gender-queer patient (prefers \"he/they\", AFAB) with PMH notable for asthma, fibromyalgia, depression, idiopathic hypersomnia vs narcolepsy, and paroxysmal events presenting with breakthrough seizure.     Patient was witnessed to have an left-sided clonic activity followed by a GTC lasting ~8 minutes with event consistent with prior. Currently at neurologic baseline but complaining of generalized muscle soreness. LTG level obtained in the ED found to be <0.1. Given levetiracetam 750 mg and lamotrigine 150 mg. Admitted to Neurology for epilepsy workup with cvEEG, MRI brain w/o contrast, and ASM titration. cvEEG was deferred due to patient preference. MRI brain showed      Updates (1/17)  - MRI brain w/o contrast showed slightly thickened left hippocampal body and tail with elevated T2 and FLAIR hyperintensity, possibly reflecting sequela of recent seizure activity  - cvEEG was not connected, deferred to future EMU admission     4D Classification of Paroxysmal Events  Type: Unspecified Paroxysmal Events  Semiology: Olfactory Aura -> Sensory aura -> LUE clonic (D) -> LLE clonic -> GTC  EZ: Unknown  Etiology: Unknown  Comorbidities: Depression, fibromyalgia  Add'l Features:     - Lateralizing Signs: NA     - Diagnostic Signs: NA     - Event Triggers: NA  Onset/Frequency: 12-years-old, occurs once \"every few months\"  AEDs: Current - LTG (mood-dose), (GBP)  Prior- UNK     #Breakthrough Seizure  #C/f Epilepsy, mesial temporal  : : Home lamotrigine 100 mg daily and gabapentin 300 mg daily  : : Received levetiracetam 750 mg in the ED prior to admission  : : MRI brain seizure protocol (1/17) showed slightly thickened left hippocampal body and tail with elevated T2 and FLAIR hyperintensity  - Continue lamotrigine 75 mg BID  - Continue levetiracetam 750 mg BID  - Start lorazepam 2 mg IV PRN for " motor seizure lasting more >3 minutes or a cluster of 3 or more motor seizures in an 8 hour period     #Hypersomnia vs Narcolepsy  - Continue home amphetamine-dextroamphetamine 10 mg BID  - Continue home modafinil 200 mg BID     #Depression  #Fibromyalgia  - Hold home bupropion given risk of lowering seizure threshold  - Continue gabapentin 300 mg daily     #Asthma  - Continue home inhalers      F: PRN  E: PRN  N: Regular  A: PIV  GI: None  DVT: Enoxaparin     Code Status: Full Code  Surrogate Decision-Maker: Roommate (David Levi [phone])          This patient was seen, discussed and examined with the attending, Dr. Norris, who agrees with the management plan.    Anthony Lopez MD  PGY-1, Neurology  Epilepsy: [phone]

## 2025-01-17 NOTE — SIGNIFICANT EVENT
"Preliminary EEG Report     This vEEG is normal. No epileptiform discharges or lateralizing signs are seen.     This EEG was read from 1721 to 1744 on 01/17/25 .     The final impression will be available tomorrow under Chart Review in the Media tab. If the plan is to discontinue the video EEG, please place a \"Discontinue Continuous VEEG\" order in the EMR; otherwise the EEG tech will not receive the notification.      Ariella Stevenson MD  Adult Epilepsy Fellow  [facility] [place]    "

## 2025-01-17 NOTE — CARE PLAN
Patient needed information about transportation for most everything. A paratransit application was provided at bedside.    Community Resource Name:   Phone Number:   Staff Member:      Discussed the following topics on behalf of the patient:  []  Behavioral Health Assistance     []  Case Management  []   Assistance  []  Digital Equity Assistance  []  Dental Health Assistance  []  Education Assistance  []  Employment Assistance  []  Financial Strain Relief Assistance  []  Food Insecurity Assistance  []  Healthcare Coverage Assistance  []  Housing Stability Assistance  []  IP Violence Relief Assistance  []  Legal Assistance  []  Physical Activity Assistance  []  Social Connection Assistance  []  Stress Relief Assistance   []  Substance Abuse Assistance  [x]  Transportation Assistance  []  Utility Assistance  []  Other: [insert comment here]    Next Steps:         Vicki Sharp LCSW

## 2025-01-18 VITALS
OXYGEN SATURATION: 97 % | HEIGHT: 66 IN | DIASTOLIC BLOOD PRESSURE: 80 MMHG | SYSTOLIC BLOOD PRESSURE: 116 MMHG | HEART RATE: 85 BPM | BODY MASS INDEX: 33.43 KG/M2 | WEIGHT: 208 LBS | RESPIRATION RATE: 17 BRPM | TEMPERATURE: 98.1 F

## 2025-01-18 PROCEDURE — 2500000004 HC RX 250 GENERAL PHARMACY W/ HCPCS (ALT 636 FOR OP/ED): Performed by: STUDENT IN AN ORGANIZED HEALTH CARE EDUCATION/TRAINING PROGRAM

## 2025-01-18 PROCEDURE — 95715 VEEG EA 12-26HR INTMT MNTR: CPT

## 2025-01-18 PROCEDURE — G0378 HOSPITAL OBSERVATION PER HR: HCPCS

## 2025-01-18 PROCEDURE — 95720 EEG PHY/QHP EA INCR W/VEEG: CPT | Performed by: STUDENT IN AN ORGANIZED HEALTH CARE EDUCATION/TRAINING PROGRAM

## 2025-01-18 PROCEDURE — 94640 AIRWAY INHALATION TREATMENT: CPT

## 2025-01-18 PROCEDURE — 96372 THER/PROPH/DIAG INJ SC/IM: CPT | Performed by: STUDENT IN AN ORGANIZED HEALTH CARE EDUCATION/TRAINING PROGRAM

## 2025-01-18 PROCEDURE — 99239 HOSP IP/OBS DSCHRG MGMT >30: CPT

## 2025-01-18 PROCEDURE — 2500000001 HC RX 250 WO HCPCS SELF ADMINISTERED DRUGS (ALT 637 FOR MEDICARE OP): Performed by: STUDENT IN AN ORGANIZED HEALTH CARE EDUCATION/TRAINING PROGRAM

## 2025-01-18 PROCEDURE — 2500000002 HC RX 250 W HCPCS SELF ADMINISTERED DRUGS (ALT 637 FOR MEDICARE OP, ALT 636 FOR OP/ED)

## 2025-01-18 PROCEDURE — 95700 EEG CONT REC W/VID EEG TECH: CPT

## 2025-01-18 RX ADMIN — LAMOTRIGINE 75 MG: 25 TABLET ORAL at 09:27

## 2025-01-18 RX ADMIN — SERTRALINE 50 MG: 50 TABLET, FILM COATED ORAL at 09:27

## 2025-01-18 RX ADMIN — MODAFINIL 200 MG: 100 TABLET ORAL at 09:27

## 2025-01-18 RX ADMIN — LEVETIRACETAM 750 MG: 250 TABLET, FILM COATED ORAL at 09:27

## 2025-01-18 RX ADMIN — FLUTICASONE FUROATE AND VILANTEROL TRIFENATATE 1 PUFF: 200; 25 POWDER RESPIRATORY (INHALATION) at 08:21

## 2025-01-18 RX ADMIN — DEXTROAMPHETAMINE SACCHARATE, AMPHETAMINE ASPARTATE, DEXTROAMPHETAMINE SULFATE AND AMPHETAMINE SULFATE 10 MG: 2.5; 2.5; 2.5; 2.5 TABLET ORAL at 13:08

## 2025-01-18 RX ADMIN — ENOXAPARIN SODIUM 40 MG: 100 INJECTION SUBCUTANEOUS at 06:35

## 2025-01-18 RX ADMIN — DEXTROAMPHETAMINE SACCHARATE, AMPHETAMINE ASPARTATE MONOHYDRATE, DEXTROAMPHETAMINE SULFATE AND AMPHETAMINE SULFATE 20 MG: 2.5; 2.5; 2.5; 2.5 CAPSULE, EXTENDED RELEASE ORAL at 09:28

## 2025-01-18 RX ADMIN — DEXTROAMPHETAMINE SACCHARATE, AMPHETAMINE ASPARTATE, DEXTROAMPHETAMINE SULFATE AND AMPHETAMINE SULFATE 10 MG: 2.5; 2.5; 2.5; 2.5 TABLET ORAL at 09:28

## 2025-01-18 RX ADMIN — FERROUS SULFATE TAB 325 MG (65 MG ELEMENTAL FE) 325 MG: 325 (65 FE) TAB at 06:35

## 2025-01-18 ASSESSMENT — COGNITIVE AND FUNCTIONAL STATUS - GENERAL
MOBILITY SCORE: 24
DAILY ACTIVITIY SCORE: 24

## 2025-01-18 ASSESSMENT — PAIN SCALES - GENERAL
PAINLEVEL_OUTOF10: 0 - NO PAIN
PAINLEVEL_OUTOF10: 0 - NO PAIN

## 2025-01-18 NOTE — DISCHARGE SUMMARY
Discharge Diagnosis  Seizure (Multi)    Epilepsy Quality and Core Measure Topics  The patient was encouraged to keep a seizure diary  The patient was encouraged to practice good sleep hygiene  The risks and benefits of pertinent medications were discussed with the patient and/or family  Addressed all relevant psychiatric comorbidities  First Time Seizures  No this is not the patient's first seizure  Is this patient seizure free?  Yes, no treatment changes at this time  Should this patient observe standard seizure precautions?  Yes Reviewed seizure precautions with patient; specifically, the patient may not drive, may not operate heavy machinery, ought not swim unsupervised, should shower rather than bath, should be cautious with hot or heavy objects, and should not perform any activities at heights such as on a ladder. This will be re-assessed at the patient's next appointment.   Medication Side Effects Discussion Statement  The risks and benefits of pertinent medications were discussed with the patient and/or kin.  Women with Epilepsy Discussion  Discussion for a female patient with epilepsy of childbearing age consisted of: The risks and benefits of oral contraceptive pills and/or hormone replacement therapy as it pertains to her epilepsy and the treatment thereof., The benefits of daily high-dose folic acid in women with epilepsy of childbearing age., The potential benefits of Vitamin D and calcium supplementation., To please inform us if you plan to get pregnant so that we can review your antiepileptic options and monitor you more frequently., and To please inform us if contraception is changed or discontinued.    Issues Requiring Follow-Up  - Plan to increase lamotrigine to goal 100 mg BID, and levetiracetam was started at 750 mg BID  - Neurology follow-up with scheduled with Dr. Aleman  - EMU admission (order placed prior to discharge)    Test Results Pending At Discharge  Pending Labs       No current  "pending labs.          Hospital Course  19-year-old gender-queer patient (uses \"he/they\", AFAB) with PMH notable for asthma, fibromyalgia, depression, idiopathic hypersomnia vs narcolepsy, and paroxysmal events presenting with breakthrough seizure.     Patient was witnessed to have an left-sided clonic activity followed by a GTC lasting ~8 minutes with event consistent with prior. Patient returned to neurologic baseline following the event but complained of generalized muscle soreness. Lamotrigine level on admission <0.1. Admitted to Neurology for epilepsy workup with cvEEG, MRI brain w/o contrast, and ASM titration.    Patient initially deferred EEG, preferring not to have EEG completed in the presence of patient's father and complex social situation. Lamotrigine was continued at 75 mg BID with goal 100 mg BID after two weeks. MRI brain epilepsy protocol performed. On review by the Neurology team, no gross abnormalities in the right hemisphere as might be expected given left-sided semiology. Per Radiology report, slightly thickened left  hippocampal body and tail with T2/FLAIR hyperintensity.    Patient agrees with plan for EMU admission in late May 2025 after their academic school year. Patient connected to EEG on 1/17, which showed no epileptiform activity.    Follow-ups:  - Plan to increase lamotrigine to goal 100 mg BID, and levetiracetam was started at 750 mg BID  - Neurology follow-up with scheduled with Dr. Aleman  - EMU admission (order placed prior to discharge)    Pertinent Physical Exam At Time of Discharge  GEN: In no acute distress, resting comfortably in bed  HEENT: Mucous membranes moist; eyes, ears, and nose without exudates; no scleral icterus  CV: Regular rate and rhythm; no murmurs, rubs, or gallops  RESP: Clear to auscultation bilaterally; no accessory muscle use; normal work of breathing  ABD: Soft, non-tender, non-distended; bowel sounds present  EXT: Radial pulses strong bilaterally; no " peripheral edema  SKIN: Warm and dry; no visible rashes     MENTAL STATE:  Oriented to person, place, and date. Recent and remote memory intact. Normal attention span and concentration. Language testing was normal for comprehension, repetition, expression, and naming.     CRANIAL NERVES:  CN 2:  Visual fields full to confrontation.  CN 3, 4, 6: Pupils round, 4 mm in diameter, equally reactive to light. No ptosis. EOMs  normal alignment, full range with normal saccades, pursuit and convergence. No nystagmus.  CN 5:  Facial sensation intact bilaterally.  CN 7:  Normal and symmetric facial strength. Nasolabial folds symmetric.  CN 8:  Hearing intact to conversation.  CN 9/10: Palate elevates symmetrically.  CN 11:  5/5 strength of bilateral shoulder shrug and neck turn.  CN 12:  Tongue midline, with normal bulk and strength; no fasciculations.      MOTOR:   Normal muscle bulk and tone in both upper and lower extremities.   No fasciculations, tremor or other abnormal movements were observed.        R L  Shoulder abduction 5 5  Elbow flexion  5 5  Elbow extension 5 5  Wrist flexion  5 5  Wrist extension 5 5     Hip flexion  5 5  Knee flexion  5 5  Knee extension 5 5  Dorsiflexion  5 5  Plantarflexion  5 5     REFLEXES:      R L  BR   2 2  Biceps   2 2  Triceps   2 2  Knee   2 2  Ankle   2 2     Babinski: Toes downgoing to plantar stimulation. No clonus or other pathologic reflexes present.      SENSORY:   Intact to light touch in both upper and lower extremities.     COORDINATION:    Finger-to-nose intact without dysmetria or overshoot in bilateral upper extremities.  Heel-to-shin intact in bilateral lower extremities.     GAIT:   Not assessed.    Home Medications     Medication List      START taking these medications     levETIRAcetam 750 mg tablet; Commonly known as: Keppra; Take 1 tablet   (750 mg) by mouth 2 times a day.     CHANGE how you take these medications     acetaminophen 500 mg tablet; Commonly known as:  Tylenol; What changed:   Another medication with the same name was removed. Continue taking this   medication, and follow the directions you see here.   budesonide-formoteroL 160-4.5 mcg/actuation inhaler; Commonly known as:   Symbicort; What changed: when to take this, reasons to take this, Another   medication with the same name was removed. Continue taking this   medication, and follow the directions you see here.   ferrous sulfate (325 mg ferrous sulfate) tablet; Take 1 tablet (325 mg)   by mouth once daily.; What changed: how much to take   fluticasone propion-salmeteroL 250-50 mcg/dose diskus inhaler; Commonly   known as: Advair Diskus; Inhale 1 puff 2 times a day. Rinse mouth with   water after use to reduce aftertaste and incidence of candidiasis. Do not   swallow.; What changed: when to take this, reasons to take this   * lamoTRIgine 25 mg tablet; Commonly known as: LaMICtal; Take 3 tablets   (75 mg) by mouth 2 times a day for 14 days, THEN 4 tablets (100 mg) 2   times a day for 14 days.; Start taking on: January 17, 2025; What changed:   You were already taking a medication with the same name, and this   prescription was added. Make sure you understand how and when to take   each.   * lamoTRIgine 100 mg tablet; Commonly known as: LaMICtal; Take 1 tablet   (100 mg) by mouth 2 times a day. Do not fill before February 14, 2025.;   Start taking on: February 14, 2025; What changed: when to take this, These   instructions start on February 14, 2025. If you are unsure what to do   until then, ask your doctor or other care provider.  * This list has 2 medication(s) that are the same as other medications   prescribed for you. Read the directions carefully, and ask your doctor or   other care provider to review them with you.     CONTINUE taking these medications     * albuterol 90 mcg/actuation inhaler; Inhale 2 puffs every 4 hours if   needed.   * albuterol 2.5 mg/0.5 mL nebulizer solution; USE ONE DOSE EVERY 4  HOURS   AS NEEDED FOR SHORTNESS OF BREATH   * amphetamine-dextroamphetamine 10 mg tablet; Commonly known as:   AdderalL; Take 1 tablet (10 mg) by mouth 2 times a day.   buPROPion  mg 24 hr tablet; Commonly known as: Wellbutrin XL; Take   3 tablets (450 mg) by mouth once daily.   gabapentin 300 mg capsule; Commonly known as: Neurontin; Take 1 capsule   (300 mg) by mouth once daily at bedtime.   humidifiers misc; 1 Units once daily as needed (for shortness of breath,   wheezing).   ibuprofen 200 mg tablet   ipratropium 17 mcg/actuation inhaler; Commonly known as: Atrovent   modafinil 200 mg tablet; Commonly known as: Provigil; Take 1 tablet (200   mg) by mouth 2 times a day.   sertraline 50 mg tablet; Commonly known as: Zoloft  * This list has 3 medication(s) that are the same as other medications   prescribed for you. Read the directions carefully, and ask your doctor or   other care provider to review them with you.     ASK your doctor about these medications     * amphetamine-dextroamphetamine XR 10 mg 24 hr capsule; Commonly known   as: Adderall XR; Take 2 capsules (20 mg) by mouth once daily in the   morning. Do not crush or chew.  * This list has 1 medication(s) that are the same as other medications   prescribed for you. Read the directions carefully, and ask your doctor or   other care provider to review them with you.     Outpatient Follow-Up  Future Appointments   Date Time Provider Department [place]   2/3/2025  3:40 PM Omayra Buitrago MD M[facility] [id number] Academic   2/13/2025  1:30 PM Ezekiel Aguilera MD [id number] Academic   2/25/2025  9:00 AM Omayra Buitrago MD M[facility] [id number] Academic   4/23/2025  3:00 PM Maurice Aleman MD [id number] Academic     Anthony Lopez MD

## 2025-01-18 NOTE — CARE PLAN
The patient's goals for the shift include rest    The clinical goals for the shift include Pt. will remain safe and free from injury throughout shift.    Over the shift, the patient met these goals.

## 2025-01-18 NOTE — NURSING NOTE
Pt discharged to home.  Discharge instructions given & reviewed with pt.  Discussed new meds & dose changes.  Meds received from pharmacy here.  Reviewed seizure precautions.  Pt obtained note for school.  IV removed.  All questions answered.  CASSI Mayberry RN

## 2025-01-20 ENCOUNTER — APPOINTMENT (OUTPATIENT)
Dept: RADIOLOGY | Facility: HOSPITAL | Age: 20
End: 2025-01-20
Payer: COMMERCIAL

## 2025-01-20 ENCOUNTER — PHARMACY VISIT (OUTPATIENT)
Dept: PHARMACY | Facility: CLINIC | Age: 20
End: 2025-01-20
Payer: COMMERCIAL

## 2025-01-20 ENCOUNTER — HOSPITAL ENCOUNTER (OUTPATIENT)
Facility: HOSPITAL | Age: 20
Setting detail: OBSERVATION
LOS: 1 days | Discharge: HOME | End: 2025-01-23
Attending: EMERGENCY MEDICINE | Admitting: PSYCHIATRY & NEUROLOGY
Payer: COMMERCIAL

## 2025-01-20 DIAGNOSIS — R56.9 SEIZURE (MULTI): Primary | ICD-10-CM

## 2025-01-20 DIAGNOSIS — S73.101A HIP SPRAIN, RIGHT, INITIAL ENCOUNTER: ICD-10-CM

## 2025-01-20 LAB
ALBUMIN SERPL BCP-MCNC: 4.6 G/DL (ref 3.4–5)
ALP SERPL-CCNC: 64 U/L (ref 33–120)
ALT SERPL W P-5'-P-CCNC: 9 U/L (ref 7–52)
ANION GAP BLDV CALCULATED.4IONS-SCNC: 13 MMOL/L (ref 10–25)
ANION GAP SERPL CALC-SCNC: 14 MMOL/L (ref 10–20)
AST SERPL W P-5'-P-CCNC: 12 U/L (ref 9–39)
B-HCG SERPL-ACNC: <3 MIU/ML
BASE EXCESS BLDV CALC-SCNC: -2.7 MMOL/L (ref -2–3)
BASOPHILS # BLD AUTO: 0.07 X10*3/UL (ref 0–0.1)
BASOPHILS NFR BLD AUTO: 0.6 %
BILIRUB SERPL-MCNC: 0.3 MG/DL (ref 0–1.2)
BODY TEMPERATURE: 37 DEGREES CELSIUS
BUN SERPL-MCNC: 10 MG/DL (ref 6–23)
CA-I BLDV-SCNC: 1.12 MMOL/L (ref 1.1–1.33)
CALCIUM SERPL-MCNC: 9.5 MG/DL (ref 8.6–10.6)
CHLORIDE BLDV-SCNC: 104 MMOL/L (ref 98–107)
CHLORIDE SERPL-SCNC: 104 MMOL/L (ref 98–107)
CO2 SERPL-SCNC: 23 MMOL/L (ref 21–32)
CREAT SERPL-MCNC: 0.8 MG/DL (ref 0.5–1.3)
EGFRCR SERPLBLD CKD-EPI 2021: >90 ML/MIN/1.73M*2
EOSINOPHIL # BLD AUTO: 0.08 X10*3/UL (ref 0–0.7)
EOSINOPHIL NFR BLD AUTO: 0.6 %
ERYTHROCYTE [DISTWIDTH] IN BLOOD BY AUTOMATED COUNT: 15.7 % (ref 11.5–14.5)
GLUCOSE BLDV-MCNC: 75 MG/DL (ref 74–99)
GLUCOSE SERPL-MCNC: 77 MG/DL (ref 74–99)
HCO3 BLDV-SCNC: 21.8 MMOL/L (ref 22–26)
HCT VFR BLD AUTO: 37.3 % (ref 36–52)
HCT VFR BLD EST: 37 % (ref 36–52)
HGB BLD-MCNC: 12.3 G/DL (ref 12–17.5)
HGB BLDV-MCNC: 12.2 G/DL (ref 12–17.5)
HOLD SPECIMEN: NORMAL
IMM GRANULOCYTES # BLD AUTO: 0.05 X10*3/UL (ref 0–0.7)
IMM GRANULOCYTES NFR BLD AUTO: 0.4 % (ref 0–0.9)
INHALED O2 CONCENTRATION: 21 %
LACTATE BLDV-SCNC: 2 MMOL/L (ref 0.4–2)
LAMOTRIGINE SERPL-MCNC: 3.1 UG/ML (ref 2.5–15)
LEVETIRACETAM SERPL-MCNC: 20 UG/ML (ref 10–40)
LYMPHOCYTES # BLD AUTO: 2.85 X10*3/UL (ref 1.2–4.8)
LYMPHOCYTES NFR BLD AUTO: 22.7 %
MCH RBC QN AUTO: 22.9 PG (ref 26–34)
MCHC RBC AUTO-ENTMCNC: 33 G/DL (ref 32–36)
MCV RBC AUTO: 69 FL (ref 80–100)
MONOCYTES # BLD AUTO: 1.15 X10*3/UL (ref 0.1–1)
MONOCYTES NFR BLD AUTO: 9.2 %
NEUTROPHILS # BLD AUTO: 8.34 X10*3/UL (ref 1.2–7.7)
NEUTROPHILS NFR BLD AUTO: 66.5 %
NRBC BLD-RTO: 0 /100 WBCS (ref 0–0)
OXYHGB MFR BLDV: 68.2 % (ref 45–75)
PCO2 BLDV: 36 MM HG (ref 41–51)
PH BLDV: 7.39 PH (ref 7.33–7.43)
PLATELET # BLD AUTO: 333 X10*3/UL (ref 150–450)
PO2 BLDV: 45 MM HG (ref 35–45)
POTASSIUM BLDV-SCNC: 3.2 MMOL/L (ref 3.5–5.3)
POTASSIUM SERPL-SCNC: 3.3 MMOL/L (ref 3.5–5.3)
PROT SERPL-MCNC: 7.1 G/DL (ref 6.4–8.2)
RBC # BLD AUTO: 5.38 X10*6/UL (ref 4–5.9)
SAO2 % BLDV: 69 % (ref 45–75)
SODIUM BLDV-SCNC: 136 MMOL/L (ref 136–145)
SODIUM SERPL-SCNC: 138 MMOL/L (ref 136–145)
WBC # BLD AUTO: 12.5 X10*3/UL (ref 4.4–11.3)

## 2025-01-20 PROCEDURE — 73700 CT LOWER EXTREMITY W/O DYE: CPT | Mod: RT

## 2025-01-20 PROCEDURE — 36415 COLL VENOUS BLD VENIPUNCTURE: CPT | Performed by: EMERGENCY MEDICINE

## 2025-01-20 PROCEDURE — 73502 X-RAY EXAM HIP UNI 2-3 VIEWS: CPT | Mod: RIGHT SIDE | Performed by: RADIOLOGY

## 2025-01-20 PROCEDURE — 96365 THER/PROPH/DIAG IV INF INIT: CPT

## 2025-01-20 PROCEDURE — 85025 COMPLETE CBC W/AUTO DIFF WBC: CPT | Performed by: EMERGENCY MEDICINE

## 2025-01-20 PROCEDURE — 84132 ASSAY OF SERUM POTASSIUM: CPT | Performed by: EMERGENCY MEDICINE

## 2025-01-20 PROCEDURE — RXMED WILLOW AMBULATORY MEDICATION CHARGE

## 2025-01-20 PROCEDURE — 2500000004 HC RX 250 GENERAL PHARMACY W/ HCPCS (ALT 636 FOR OP/ED): Performed by: EMERGENCY MEDICINE

## 2025-01-20 PROCEDURE — 2500000001 HC RX 250 WO HCPCS SELF ADMINISTERED DRUGS (ALT 637 FOR MEDICARE OP)

## 2025-01-20 PROCEDURE — 2500000001 HC RX 250 WO HCPCS SELF ADMINISTERED DRUGS (ALT 637 FOR MEDICARE OP): Performed by: EMERGENCY MEDICINE

## 2025-01-20 PROCEDURE — 2500000005 HC RX 250 GENERAL PHARMACY W/O HCPCS

## 2025-01-20 PROCEDURE — 73700 CT LOWER EXTREMITY W/O DYE: CPT | Mod: RIGHT SIDE | Performed by: RADIOLOGY

## 2025-01-20 PROCEDURE — 2500000004 HC RX 250 GENERAL PHARMACY W/ HCPCS (ALT 636 FOR OP/ED)

## 2025-01-20 PROCEDURE — 73130 X-RAY EXAM OF HAND: CPT | Mod: LT

## 2025-01-20 PROCEDURE — 73503 X-RAY EXAM HIP UNI 4/> VIEWS: CPT | Mod: RT

## 2025-01-20 PROCEDURE — 80175 DRUG SCREEN QUAN LAMOTRIGINE: CPT

## 2025-01-20 PROCEDURE — 84702 CHORIONIC GONADOTROPIN TEST: CPT

## 2025-01-20 PROCEDURE — 99285 EMERGENCY DEPT VISIT HI MDM: CPT | Performed by: EMERGENCY MEDICINE

## 2025-01-20 PROCEDURE — 99285 EMERGENCY DEPT VISIT HI MDM: CPT | Mod: 25 | Performed by: EMERGENCY MEDICINE

## 2025-01-20 PROCEDURE — 36415 COLL VENOUS BLD VENIPUNCTURE: CPT

## 2025-01-20 PROCEDURE — 73130 X-RAY EXAM OF HAND: CPT | Mod: LEFT SIDE | Performed by: RADIOLOGY

## 2025-01-20 PROCEDURE — 99223 1ST HOSP IP/OBS HIGH 75: CPT

## 2025-01-20 PROCEDURE — 80177 DRUG SCRN QUAN LEVETIRACETAM: CPT

## 2025-01-20 PROCEDURE — 96375 TX/PRO/DX INJ NEW DRUG ADDON: CPT

## 2025-01-20 RX ORDER — OXYCODONE HYDROCHLORIDE 5 MG/1
5 TABLET ORAL ONCE
Status: DISCONTINUED | OUTPATIENT
Start: 2025-01-20 | End: 2025-01-21

## 2025-01-20 RX ORDER — LEVETIRACETAM 10 MG/ML
1000 INJECTION INTRAVASCULAR ONCE
Status: COMPLETED | OUTPATIENT
Start: 2025-01-20 | End: 2025-01-20

## 2025-01-20 RX ORDER — ACETAMINOPHEN 325 MG/1
975 TABLET ORAL ONCE
Status: COMPLETED | OUTPATIENT
Start: 2025-01-20 | End: 2025-01-20

## 2025-01-20 RX ORDER — LIDOCAINE 560 MG/1
1 PATCH PERCUTANEOUS; TOPICAL; TRANSDERMAL ONCE
Status: COMPLETED | OUTPATIENT
Start: 2025-01-20 | End: 2025-01-21

## 2025-01-20 RX ORDER — KETOROLAC TROMETHAMINE 10 MG/1
10 TABLET, FILM COATED ORAL ONCE
Status: COMPLETED | OUTPATIENT
Start: 2025-01-20 | End: 2025-01-20

## 2025-01-20 RX ORDER — METHOCARBAMOL 100 MG/ML
1000 INJECTION, SOLUTION INTRAMUSCULAR; INTRAVENOUS ONCE
Status: COMPLETED | OUTPATIENT
Start: 2025-01-20 | End: 2025-01-20

## 2025-01-20 RX ORDER — ONDANSETRON HYDROCHLORIDE 2 MG/ML
4 INJECTION, SOLUTION INTRAVENOUS ONCE
Status: COMPLETED | OUTPATIENT
Start: 2025-01-20 | End: 2025-01-20

## 2025-01-20 RX ORDER — IBUPROFEN 600 MG/1
600 TABLET ORAL ONCE
Status: COMPLETED | OUTPATIENT
Start: 2025-01-20 | End: 2025-01-20

## 2025-01-20 RX ORDER — LEVETIRACETAM 1000 MG/1
1000 TABLET ORAL 2 TIMES DAILY
Qty: 60 TABLET | Refills: 0 | Status: SHIPPED | OUTPATIENT
Start: 2025-01-20 | End: 2026-01-20

## 2025-01-20 RX ORDER — KETOROLAC TROMETHAMINE 15 MG/ML
15 INJECTION, SOLUTION INTRAMUSCULAR; INTRAVENOUS ONCE
Status: COMPLETED | OUTPATIENT
Start: 2025-01-20 | End: 2025-01-20

## 2025-01-20 RX ORDER — LAMOTRIGINE 25 MG/1
75 TABLET ORAL ONCE
Status: COMPLETED | OUTPATIENT
Start: 2025-01-20 | End: 2025-01-20

## 2025-01-20 RX ORDER — MIDAZOLAM 5 MG/.1ML
5 SPRAY NASAL ONCE
Qty: 5 EACH | Refills: 0 | Status: CANCELLED | OUTPATIENT
Start: 2025-01-20 | End: 2025-01-20

## 2025-01-20 RX ADMIN — LEVETIRACETAM 1000 MG: 10 INJECTION INTRAVENOUS at 03:25

## 2025-01-20 RX ADMIN — LAMOTRIGINE 75 MG: 25 TABLET ORAL at 12:15

## 2025-01-20 RX ADMIN — ACETAMINOPHEN 975 MG: 325 TABLET ORAL at 12:15

## 2025-01-20 RX ADMIN — IBUPROFEN 600 MG: 600 TABLET, FILM COATED ORAL at 09:49

## 2025-01-20 RX ADMIN — ACETAMINOPHEN 975 MG: 325 TABLET ORAL at 18:36

## 2025-01-20 RX ADMIN — KETOROLAC TROMETHAMINE 10 MG: 10 TABLET, FILM COATED ORAL at 23:02

## 2025-01-20 RX ADMIN — LIDOCAINE 4% 1 PATCH: 40 PATCH TOPICAL at 14:43

## 2025-01-20 RX ADMIN — KETOROLAC TROMETHAMINE 15 MG: 15 INJECTION, SOLUTION INTRAMUSCULAR; INTRAVENOUS at 14:44

## 2025-01-20 RX ADMIN — ONDANSETRON 4 MG: 2 INJECTION INTRAMUSCULAR; INTRAVENOUS at 09:49

## 2025-01-20 RX ADMIN — LEVETIRACETAM 1000 MG: 10 INJECTION INTRAVENOUS at 03:24

## 2025-01-20 RX ADMIN — METHOCARBAMOL 1000 MG: 100 INJECTION INTRAMUSCULAR; INTRAVENOUS at 12:15

## 2025-01-20 ASSESSMENT — PAIN SCALES - GENERAL
PAINLEVEL_OUTOF10: 7
PAINLEVEL_OUTOF10: 8
PAINLEVEL_OUTOF10: 7

## 2025-01-20 ASSESSMENT — PAIN DESCRIPTION - ONSET: ONSET: ONGOING

## 2025-01-20 ASSESSMENT — PAIN DESCRIPTION - PROGRESSION: CLINICAL_PROGRESSION: NOT CHANGED

## 2025-01-20 ASSESSMENT — PAIN - FUNCTIONAL ASSESSMENT
PAIN_FUNCTIONAL_ASSESSMENT: 0-10
PAIN_FUNCTIONAL_ASSESSMENT: 0-10

## 2025-01-20 ASSESSMENT — PAIN DESCRIPTION - PAIN TYPE
TYPE: ACUTE PAIN
TYPE: ACUTE PAIN

## 2025-01-20 ASSESSMENT — ENCOUNTER SYMPTOMS
SEIZURES: 1
RESPIRATORY NEGATIVE: 1
CONSTITUTIONAL NEGATIVE: 1

## 2025-01-20 ASSESSMENT — PAIN DESCRIPTION - LOCATION
LOCATION: HIP
LOCATION: HIP

## 2025-01-20 ASSESSMENT — PAIN DESCRIPTION - ORIENTATION
ORIENTATION: RIGHT
ORIENTATION: RIGHT

## 2025-01-20 NOTE — ED PROVIDER NOTES
Emergency Department Provider Note          History of Present Illness     CC: No chief complaint on file.     History provided by: Patient  Limitations to History: None    HPI:   Lisa Ramirez is a 19 y.o.adult with PMH asthma, fibromyalgia, depression, idiopathic hypersomnia, paroxysmal events with breakthrough seizure, presenting to the Emergency Department for seizure-like activity.  Patient is gender queer and uses the pronouns he/they (goes by Spenser).  Accompanied today by his friends.  Reports he had a witnessed 3-4 episodes of generalized tonic-clonic shaking that lasted around a minute each.  No return to baseline between these episodes.  Was just discharged from the hospital 2 days ago for similar episode of generalized tonic-clonic motion.  EEG was performed at that time that showed no epileptiform activity. MRI brain epilepsy was also performed at that time that showed no epileptiform activity.  On arrival to emergency department, patient is arousable to sternal rub.  Moving all 4 extremities appropriately.  No focal neurologic deficits noted.  Denies fevers, chills, chest pain, shortness of breath, abdominal pain, or changes in urination/stool.  Friend believes that he has been compliant with his medications.    Records Reviewed: Recent available ED and inpatient notes reviewed in EMR.    PMHx/PSHx:  Per HPI.   - has a past medical history of Asthma, Exercise induced bronchospasm (HHS-HCC), Personal history of diseases of the blood and blood-forming organs and certain disorders involving the immune mechanism, Personal history of other mental and behavioral disorders, Personal history of other mental and behavioral disorders, and Personal history of other specified conditions.  - has no past surgical history on file.  - has Asthma, acute (HHS-HCC); Stridor; Anemia; Anxiety; Depression; Obesity (BMI 30-39.9); Idiopathic hypersomnia; and Seizure (Multi) on their problem list.    Medications:  Current  Outpatient Medications   Medication Instructions    acetaminophen (TYLENOL) 1,000 mg, oral, Every 6 hours PRN    albuterol 2.5 mg/0.5 mL nebulizer solution USE ONE DOSE EVERY 4 HOURS AS NEEDED FOR SHORTNESS OF BREATH    albuterol 90 mcg/actuation inhaler 2 puffs, inhalation, Every 4 hours PRN    amphetamine-dextroamphetamine (AdderalL) 10 mg tablet 10 mg, oral, 2 times daily    amphetamine-dextroamphetamine XR (Adderall XR) 10 mg 24 hr capsule 20 mg, oral, Every morning, Do not crush or chew.    budesonide-formoteroL (Symbicort) 160-4.5 mcg/actuation inhaler inhalation, 2 times daily    buPROPion XL (WELLBUTRIN XL) 450 mg, oral, Daily    ferrous sulfate (325 mg ferrous sulfate) 325 mg, oral, Daily RT    fluticasone propion-salmeteroL (Advair Diskus) 250-50 mcg/dose diskus inhaler 1 puff, inhalation, 2 times daily RT, Rinse mouth with water after use to reduce aftertaste and incidence of candidiasis. Do not swallow.    gabapentin (NEURONTIN) 300 mg, oral, Nightly    humidifiers misc 1 Units, miscellaneous, Daily PRN    ibuprofen 800 mg, oral, Every 8 hours PRN    ipratropium (Atrovent) 17 mcg/actuation inhaler 1 puff, inhalation, Daily PRN    lamoTRIgine (LaMICtal) 25 mg tablet Take 3 tablets (75 mg) by mouth 2 times a day for 14 days, THEN 4 tablets (100 mg) 2 times a day for 14 days.    [START ON 2/14/2025] lamoTRIgine (LAMICTAL) 100 mg, oral, 2 times daily    levETIRAcetam (KEPPRA) 1,000 mg, oral, 2 times daily    modafinil (PROVIGIL) 200 mg, oral, 2 times daily    nasal spray midazolam (Versed) 5 mg/spray (0.1 mL) spray,non-aerosol 1 spray, nasal, Once    sertraline (ZOLOFT) 50 mg, Daily        Allergies:  Patient has no known allergies.    Social History:  - Tobacco:  reports that he has never smoked. He has never used smokeless tobacco.   - Alcohol:  reports current alcohol use.   - Illicit Drugs:  reports current drug use. Drug: Marijuana.     ROS:  Per HPI.       Physical Exam     Triage Vitals:  T 36.4 °C  (97.5 °F)  HR (!) 109  /72  RR 20  O2 98 % None (Room air)    General: Post-ictal  Eyes: Gaze conjugate.  No scleral icterus or injection  HENT: Normo-cephalic, atraumatic. No stridor  CV: Tachycardic rate, regular rhythm. Radial pulses 2+ bilaterally  Resp: Breathing non-labored, Clear to auscultation bilaterally  GI: Soft, non-distended, non-tender. No rebound or guarding.  MSK/Extremities: No gross bony deformities. Moving all extremities  Skin: Warm. Appropriate color  Neuro: Sleepy, arousable to sternal rub and moving all extremities spontaneously.  Psych: Appropriate mood and affect          Bonita Coma Scale Score: 15                    Medical Decision Making & ED Course     EKG: EKG interpreted by myself. Please see ED Course for full interpretation.    Medical Decision Making   Lisa Ramirez is a 19 y.o.adult presenting to the Emergency Department for seizure-like activity.  On arrival, blood pressure 114/72, tachycardic to 109, rest of vital signs within normal limits.  On examination, patient appears otherwise clinically well.  Clear heart lung sounds bilaterally.  Is notably postictal but arousable to sternal rub.  Moving all 4 extremities appropriately.  Given patient's seizure-like episode today, will get basic labs, venous blood gas, Keppra/Lamictal levels, and consult the neuro epilepsy team for further assessment.    Update: Labs notable for white count of 12.5 with associated left shift.  No fevers today, no other infectious symptoms.  Likely reactive in the setting of seizures.  Chemistry relatively unremarkable.  Initial lactate on venous blood gas showed 2.0.  hCG less than 3, not pregnant.  Lamotrigine and Keppra levels within normal limits, patient has been compliant.  Loaded with additional dose of Keppra here in the ED. on reassessment patient is now back to their neurologic and clinical baseline.  Vital signs remained stable.  Signed out to oncoming provider with final  neuro epilepsy recommendations pending.        ED Course as of 01/20/25 2211 Mon Jan 20, 2025   1127 XR hip right with pelvis when performed 4+ views  No acute fracture and malalignment [SC]      ED Course User Index  [SC] Kay Graves,          Diagnoses as of 01/20/25 2211   Seizure (Multi)   Hip sprain, right, initial encounter       Independent Result Review and Interpretation: Relevant laboratory and radiographic results were reviewed and independently interpreted by myself.  As necessary, they are commented on in the ED Course.    Chronic conditions affecting the patient's care: As documented above in MDM      Disposition   Sign Out    Yasmani Campo MD  Emergency Medicine PGY3      Procedures     Procedures ? SmartLinks last updated 1/20/2025 10:11 PM        Yasmani Campo MD  Resident  01/20/25 2211
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
## 2025-01-20 NOTE — ED TRIAGE NOTES
Pt presents to ED for c/o seizures. Pt has pmhx of epilepsy on keppra. Per EMS< pt's significant other stated pt had an 8 minute tonic clonic seizure. Pt postictal upon arrival.

## 2025-01-20 NOTE — PROGRESS NOTES
"Emergency Medicine Transition of Care Note.    I received Lisa Ramirez in signout from Dr. Graves.  Please see the previous ED provider note for all HPI, PE and MDM up to the time of signout at 1500. This is in addition to the primary record.    In brief Lisa Ramirez is an 19 y.o. adult presenting for No chief complaint on file.    At the time of signout we were awaiting: Reevaluation.    ED Course as of 01/21/25 0005 Mon Jan 20, 2025   1127 XR hip right with pelvis when performed 4+ views  No acute fracture and malalignment [SC]      ED Course User Index  [SC] Kay M Ely, DO         Diagnoses as of 01/21/25 0005   Seizure (Multi)   Hip sprain, right, initial encounter       Medical Decision Making  Patient is a 19-year-old adult (gender queer, identifies identifies as he/they, goes by \"Spenser\") presenting for seizure.  Patient signed out to me.  Please refer to previous provider for H&P and work up until this point.  Patient had unremarkable workup.  Did complain of right hip pain after her seizure and x-ray was negative.  She had what she believed was another seizure here in the emergency department.  Discussed with neurology who evaluated her and felt that this was not a seizure.  However, she is having significant right hip pain and does not feel comfortable being discharged.  Given the persistent pain and inability to ambulate even with negative x-rays, obtain CT imaging of the hip.  He also reports left thumb pain and deformity that occurred after the seizure like activity.  Will obtain x-ray to evaluate for any dislocation or fracture.  Patient has already had ibuprofen, Toradol, Tylenol, lidocaine patches, Robaxin without relief of her pain.  She was offered opioids but she declines.    CT imaging negative.  Hand x-ray negative.  Discussed with the patient.  Offered admission given the persistent pain.  However, he would like to try to ambulate after receiving additional pain medications.  " Patient signed out to Dr. Campo pending ambulation trial.        Final diagnoses:   [R56.9] Seizure (Multi)   [S73.101A] Hip sprain, right, initial encounter           Procedure  Procedures    Smooth Barber DO

## 2025-01-20 NOTE — PROGRESS NOTES
Emergency Medicine Transition of Care Note.    I received Lisa Ramirez in signout from Dr. Campo.  Please see the previous ED provider note for all HPI, PE and MDM up to the time of signout at 0700. This is in addition to the primary record.    In brief Lisa Ramirez is an 19 y.o. adult presenting for No chief complaint on file.    At the time of signout we were awaiting: Neuro recs    Medical Decision Making  19 y.o.adult with PMH asthma, fibromyalgia, depression, idiopathic hypersomnia, paroxysmal events with breakthrough seizure, presenting to the Emergency Department for seizure-like activity.  He was evaluated by neurology bedside received EEG and patient was given their home Lamictal and Keppra was increased to thousand twice daily.  Patient was having significant right hip pain.  Pain with hip flexion internal rotation, no obvious deformity.  Patient endorses a history of hypermobility and could have hip sprain strain or ligamentous injury in the setting of seizure-like activity.  Patient has been having pain prior to presenting to the ED today with breakthrough seizures.  Patient trialed Tylenol Motrin and lidocaine patch without improvement of symptoms.  Trialed Robaxin without improvement trialed oxycodone without significant improvement.  X-ray reviewed with no acute fracture or malalignment.  Patient did not want to be admitted to the hospital initially though did offer for pain management and PT OT.  Patient had breakthrough seizure not requiring intervention at time of signout.  Patient likely to be admitted for ongoing seizure-like activity and acute pain management of right hip.        Please see ED course for updates on patient status, results, and disposition.     ED Course as of 01/20/25 1549   Mon Jan 20, 2025   1127 XR hip right with pelvis when performed 4+ views  No acute fracture and malalignment [SC]      ED Course User Index  [SC] Kay Graves DO         Diagnoses as of  01/20/25 1549   Seizure (Multi)   Hip sprain, right, initial encounter           Final diagnoses:   [R56.9] Seizure (Multi)           Procedure  Procedures    Patient seen and discussed with Dr. Lc Graves,   PGY-3 Emergency Medicine

## 2025-01-20 NOTE — DISCHARGE INSTRUCTIONS
Dear Spenser,    You were admitted to the hospital for seizure evaluation, which you refused. You complained of hip pain, so an x-ray and CT scan were obtained. These showed no fracture and no soft tissue injury. Physical therapy evaluated you and recommended outpatient physical therapy after leaving the hospital. You can continue to take acetaminophen and ibuprofen to manage your pain.    In addition to the above management of your hip pain, for your seizures, you will continue to take lamotrigine and levetiracetam as described below. You should also be sent with an intranasal medication, called midazolam (Versed), which can be used if you are having a seizure more than 3 minutes. You originally were scheduled for a virtual appointment with an Epilepsy specialist on 1/24/2025. This will be rescheduled for a few weeks after you leave the hospital to check in.    Remember your seizure precautions as followed:    Seizure Precautions  Safety at Home:  Avoid Heights: Use safety ho on stairs and avoid high places, such as ladders.  Bathroom Safety: Take showers instead of baths to prevent drowning. Use a shower chair if needed.  Kitchen Safety: Use the back burners on the stove and turn pot handles inward. Avoid using sharp objects when alone.  Safety Outside:  Swimming: Always swim with a  who knows how to respond to a seizure.  Sports: Wear protective gear and avoid activities with a high risk of injury, such as climbing or contact sports.  Driving: Do not drive. Continue to follow your doctor's advice regarding driving. Many people with epilepsy can drive if their seizures are well-controlled.  General Precautions:  Medication Adherence: Take your medications as prescribed. Do not skip doses.  Sleep: Ensure you get enough sleep, as lack of sleep can trigger seizures.  Alcohol and Drugs: Avoid alcohol and recreational drugs, as they can increase the risk of seizures.  Emergency Plan:  Seizure Action Plan: Have  a written plan that includes what to do during a seizure, when to call for emergency help, and contact information for your healthcare provider.  Medical ID: Wear a medical alert bracelet or carry an ID card that states you have epilepsy.  First Aid for Seizures:  Stay Calm: Keep calm and reassure others.  Protect from Injury: Move objects away from the person having a seizure. Do not restrain them.  Time the Seizure: Note the start and end time of the seizure.  Recovery Position: After the seizure, turn the person onto their side to keep the airway clear.  Do Not: Do not put anything in the person's mouth during a seizure.  Recognizing Triggers:  Identify Triggers: Keep a diary to identify and avoid seizure triggers such as stress, flashing lights, or certain foods.  Support:  Education: Educate family, friends, and coworkers about epilepsy and seizure first aid.  Support Groups: Join support groups for people with epilepsy to share experiences and gain support.    - Continue the lamotrigine titration schedule  - Take 3 tablets of 25 mg lamotrigine (total 75 mg) twice daily for 2 weeks (1/18- 1/31) then increase to 4 tablets 25 mg (total 100 mg) twice daily until your next follow-up visit  - Continue levetiracetam 1000 mg twice daily  - Close virtual follow-up with Alisa Houston (scheduled 1/24/2025)   - Please use your Versed spray if you are having a seizure that is lasting longer than 5 minutes    Thank you for the opportunity to care for you here at [facility].    Sincerely,  [facility]rology Team    --   Neurology Clinic   [facility] [place]  Phone: [phone]

## 2025-01-20 NOTE — CONSULTS
"Consults  Reason for consult: Concerns for breakthrough seizure    History Of Present Illness  Lisa Ramirez \"Alf" is a 19 y.o. 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 Neurology was consulted for the evaluation for breakthrough seizure episodes.     The patient recently visited [facility] ED on 1/16/2025 after the patient was witnessed to have an LUE clonic activity that followed by LLE clonic activity and then to GTC lasting ~ 8min. During his stay, MRI brain epilepsy protocol was performed. The MRI brain showed no no gross abnormalities in the right hemisphere. Per Radiology report, slightly thickened left hippocampal body and tail with T2/FLAIR hyperintensity. EEG on 1/17/2025 showed no epileptiform activity. On 1/18/2025, patient was discharged with the goal of increasing lamotrigine to 100mg BID while starting levitiracetam at 750mg BID; neurology follow-up with Dr. Aleman; and EMU admission scheduled in late May 2025 after his academic school year.      Today, the patient is with his friends. During the encounter, the patient remained drowsy, so friends provided most of the history. Friends witnessed at least three \"big\" seizures and several \"small\" seizures since the discharge. On the day he was discharged (1/18/2025), he had two seizures which lasted less than 5min each. During each episode, the patient developed RLE followed by RUE clonic activity. The most recent one occurred around 1:40 am 1/20/2025. Friends noticed him having RLE clonic activity which developed into bilateral LE clonic activity followed by GTC. This episode lasted ~10 min which triggered friends to bring the patient to the ED. The patient did not have urinary incontinence, lip biting and tongue biting. " "Additionally, the friends reports that the patient has \"small\" seizures when he falls asleep (facial twitching and left arm jerking movements) and throughout the day (twitching of RLE). Friends endorse that the patient has been taking the new medication regimen as instructed.     Previous Neurologic History (Summarized from previous ED visit note)  Briefly, the patient was first seen in neurology clinic on 1/31/24 for his daytime sleepiness and paroxysmal events. His daytime sleepiness started in highschool and gradually progressed. He often dreams initially with LOC which was endorsed by his friends. Regarding paroxysmal events, patient endorsed episodes of foul smell followed by LOC and diffuse motor actions since childhood. Per patient, he found himself waking up on th floor with all muscles hurting after LOC. He often had minor injuries. Usually has urinary incontinence and has once or twice awakened with blood in the mouth, but does not have stimata of tongue biting.    Past Medical History  Past Medical History:   Diagnosis Date    Asthma     Exercise induced bronchospasm ([facility])     Asthma, exercise induced    Personal history of diseases of the blood and blood-forming organs and certain disorders involving the immune mechanism     History of iron deficiency anemia    Personal history of other mental and behavioral disorders     History of depression    Personal history of other mental and behavioral disorders     History of OCD (obsessive compulsive disorder)    Personal history of other specified conditions     History of pseudoseizure     Surgical History  No past surgical history on file.  Social History  Social History     Tobacco Use    Smoking status: Never    Smokeless tobacco: Never   Vaping Use    Vaping status: Never Used   Substance Use Topics    Alcohol use: Yes    Drug use: Yes     Types: Marijuana     Comment: just a little     Allergies  Patient has no known allergies.  (Not in a hospital " "admission)    Review of Systems   Constitutional: Negative.    Respiratory: Negative.     Neurological:  Positive for seizures.     NEUROLOGICAL:  A limited neurological examination was performed d/t patient's altered level of consciousness. The patient was asleep and drowsy. The patient woke up to vocal stimulus (calling his name). When he was awake he answered orientation questions correctly (AO 3x person, time, and place). He was able to follow simple commands like \"show us your thumb\" and \"lift your left leg\".    Cognition & [employer] Coma Scale:      - Exam is confounded by the patient's altered mental status (drowsy)     - Patient was drowsy but could be awaken up.      - GCS 13    Cranial Nerves & Reflexes:  CN II: Eyes are closed. When eyelids are opened, pupils are aligned and gaze remains stationary in primary position midline. Extraocular movements intact bilaterally. No nystagmus. Normal saccades. Normal smooth pursuit.     Pupillary (II;III): Intact    CN V: Facial sensation is normal.   CN XII: Tongue midline without atrophy or fasciculations.     Sensorimotor:    Bulk: Normal   Tone: Normal   Tremor: Absent                              Reflexes:     R L  Biceps  2+ 2+  Brachioradialis 2+ 2+  Patellar  2+ 2+  Achilles 2+ 2+    Toes: flexor plantar bilaterally  Torres's: absent  Ankle Clonus: absent     Last Recorded Vitals  Blood pressure 133/79, pulse (!) 103, temperature 36.4 °C (97.5 °F), temperature source Axillary, resp. rate 18, SpO2 97%.    Relevant Results  [place] Coma Scale from ED 1/20/2025  Best Eye Response: To pain  Best Verbal Response: Confused  Best Motor Response: Localizes pain  [employer] Coma Scale Score: 11    Results for orders placed or performed during the hospital encounter of 01/20/25 (from the past 24 hours)   CBC with Differential   Result Value Ref Range    WBC 12.5 (H) 4.4 - 11.3 x10*3/uL    nRBC 0.0 0.0 - 0.0 /100 WBCs    RBC 5.38 4.00 - 5.90 x10*6/uL    Hemoglobin 12.3 12.0 " - 17.5 g/dL    Hematocrit 37.3 36.0 - 52.0 %    MCV 69 (L) 80 - 100 fL    MCH 22.9 (L) 26.0 - 34.0 pg    MCHC 33.0 32.0 - 36.0 g/dL    RDW 15.7 (H) 11.5 - 14.5 %    Platelets 333 150 - 450 x10*3/uL    Neutrophils % 66.5 40.0 - 80.0 %    Immature Granulocytes %, Automated 0.4 0.0 - 0.9 %    Lymphocytes % 22.7 13.0 - 44.0 %    Monocytes % 9.2 2.0 - 10.0 %    Eosinophils % 0.6 0.0 - 6.0 %    Basophils % 0.6 0.0 - 2.0 %    Neutrophils Absolute 8.34 (H) 1.20 - 7.70 x10*3/uL    Immature Granulocytes Absolute, Automated 0.05 0.00 - 0.70 x10*3/uL    Lymphocytes Absolute 2.85 1.20 - 4.80 x10*3/uL    Monocytes Absolute 1.15 (H) 0.10 - 1.00 x10*3/uL    Eosinophils Absolute 0.08 0.00 - 0.70 x10*3/uL    Basophils Absolute 0.07 0.00 - 0.10 x10*3/uL   Comprehensive Metabolic Panel   Result Value Ref Range    Glucose 77 74 - 99 mg/dL    Sodium 138 136 - 145 mmol/L    Potassium 3.3 (L) 3.5 - 5.3 mmol/L    Chloride 104 98 - 107 mmol/L    Bicarbonate 23 21 - 32 mmol/L    Anion Gap 14 10 - 20 mmol/L    Urea Nitrogen 10 6 - 23 mg/dL    Creatinine 0.80 0.50 - 1.30 mg/dL    eGFR >90 >60 mL/min/1.73m*2    Calcium 9.5 8.6 - 10.6 mg/dL    Albumin 4.6 3.4 - 5.0 g/dL    Alkaline Phosphatase 64 33 - 120 U/L    Total Protein 7.1 6.4 - 8.2 g/dL    AST 12 9 - 39 U/L    Bilirubin, Total 0.3 0.0 - 1.2 mg/dL    ALT 9 7 - 52 U/L   BLOOD GAS VENOUS FULL PANEL   Result Value Ref Range    POCT pH, Venous 7.39 7.33 - 7.43 pH    POCT pCO2, Venous 36 (L) 41 - 51 mm Hg    POCT pO2, Venous 45 35 - 45 mm Hg    POCT SO2, Venous 69 45 - 75 %    POCT Oxy Hemoglobin, Venous 68.2 45.0 - 75.0 %    POCT Hematocrit Calculated, Venous 37.0 36.0 - 52.0 %    POCT Sodium, Venous 136 136 - 145 mmol/L    POCT Potassium, Venous 3.2 (L) 3.5 - 5.3 mmol/L    POCT Chloride, Venous 104 98 - 107 mmol/L    POCT Ionized Calicum, Venous 1.12 1.10 - 1.33 mmol/L    POCT Glucose, Venous 75 74 - 99 mg/dL    POCT Lactate, Venous 2.0 0.4 - 2.0 mmol/L    POCT Base Excess, Venous -2.7 (L)  "-2.0 - 3.0 mmol/L    POCT HCO3 Calculated, Venous 21.8 (L) 22.0 - 26.0 mmol/L    POCT Hemoglobin, Venous 12.2 12.0 - 17.5 g/dL    POCT Anion Gap, Venous 13.0 10.0 - 25.0 mmol/L    Patient Temperature 37.0 degrees Celsius    FiO2 21 %   SST TOP   Result Value Ref Range    Extra Tube Hold for add-ons.    PST Top   Result Value Ref Range    Extra Tube Hold for add-ons.    hCG, quantitative, pregnancy   Result Value Ref Range    HCG, Beta-Quantitative <3 <5 mIU/mL   Levetiracetam   Result Value Ref Range    Keppra 20 10 - 40 ug/mL   Light Blue Top   Result Value Ref Range    Extra Tube Hold for add-ons.    Gray Top   Result Value Ref Range    Extra Tube Hold for add-ons.       Assessment/Plan   Assessment & Plan      Lisa Ramirez \"Spenser\" is a 19 y.o. 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 Neurology was consulted for the evaluation for breakthrough seizure episodes.     Previously the patient presented to [facility] ED on 1/16/2025 after left sided clonic activity. During his stay MRI brain showed no no gross abnormalities in the right hemisphere. Per Radiology report, slightly thickened left hippocampal body and tail with T2/FLAIR hyperintensity. EEG on 1/17/2025 showed no epileptiform activity. This time the patient presents with right sided clonic activity that is not responding to increased dose of lamotrigine to 100mg BID while starting levitiracetam at 750mg BID. Levetiracetam and lamotrigine values from today's ED visit are 21 and 3.1, respectively. This suggest that his seizure-like episode is less likely due to medication nonadherance. From the limited neurological examination, the patient was noted to not have any signs of lateral tong biting from his seizure-like episodes. " Post-ictal lab analyses were unrevealing. WBC was mildly elevated (12.5) and venous lactate was normal (2).     4D Classification of the Paroxysmal Episodes:  Unspecified paroxysmal events  Episodes semiology:   1) Olfactory Aura -> Sensory aura -> LUE clonic (D) -> LLE clonic -> GTC   2) facial twitching and left arm jerking movements   3) RLE jerking -> BLE jerking -> gtc  Onset/Frequency: 13yo / inconsistent frequency. 1/16, 1/18, 1/20.   Localization: Unknown  Etiology: unknown   Co-morbidities: Depression  ASM: Keppra 750mg bid, LTG 100mg bid      Recommendations:  - Recommend routine EEG given the patient is not back to his baseline.   - Will discuss potential changes to home AEM regimen   - To be staffed formally in AM with attending    HUI MAYBERRY MS3    I have reviewed and edited the information above and I agree with the assessment and plan as outlined by the medical student.     Zaheer Cordova DO  Neurology, PGY-2

## 2025-01-21 LAB
ALBUMIN SERPL BCP-MCNC: 4.1 G/DL (ref 3.4–5)
ANION GAP SERPL CALC-SCNC: 12 MMOL/L (ref 10–20)
BUN SERPL-MCNC: 13 MG/DL (ref 6–23)
CALCIUM SERPL-MCNC: 9 MG/DL (ref 8.6–10.6)
CHLORIDE SERPL-SCNC: 107 MMOL/L (ref 98–107)
CO2 SERPL-SCNC: 24 MMOL/L (ref 21–32)
CREAT SERPL-MCNC: 0.79 MG/DL (ref 0.5–1.3)
EGFRCR SERPLBLD CKD-EPI 2021: >90 ML/MIN/1.73M*2
ERYTHROCYTE [DISTWIDTH] IN BLOOD BY AUTOMATED COUNT: 16.2 % (ref 11.5–14.5)
GLUCOSE SERPL-MCNC: 84 MG/DL (ref 74–99)
HCT VFR BLD AUTO: 36.9 % (ref 36–52)
HGB BLD-MCNC: 11.1 G/DL (ref 12–17.5)
MAGNESIUM SERPL-MCNC: 2.08 MG/DL (ref 1.6–2.4)
MCH RBC QN AUTO: 22.8 PG (ref 26–34)
MCHC RBC AUTO-ENTMCNC: 30.1 G/DL (ref 32–36)
MCV RBC AUTO: 76 FL (ref 80–100)
NRBC BLD-RTO: 0 /100 WBCS (ref 0–0)
PHOSPHATE SERPL-MCNC: 4.2 MG/DL (ref 2.5–4.9)
PLATELET # BLD AUTO: 230 X10*3/UL (ref 150–450)
POTASSIUM SERPL-SCNC: 4.3 MMOL/L (ref 3.5–5.3)
RBC # BLD AUTO: 4.87 X10*6/UL (ref 4–5.9)
SODIUM SERPL-SCNC: 139 MMOL/L (ref 136–145)
WBC # BLD AUTO: 5.6 X10*3/UL (ref 4.4–11.3)

## 2025-01-21 PROCEDURE — 2500000001 HC RX 250 WO HCPCS SELF ADMINISTERED DRUGS (ALT 637 FOR MEDICARE OP)

## 2025-01-21 PROCEDURE — 80069 RENAL FUNCTION PANEL: CPT

## 2025-01-21 PROCEDURE — 36415 COLL VENOUS BLD VENIPUNCTURE: CPT

## 2025-01-21 PROCEDURE — G0378 HOSPITAL OBSERVATION PER HR: HCPCS

## 2025-01-21 PROCEDURE — 97161 PT EVAL LOW COMPLEX 20 MIN: CPT | Mod: GP

## 2025-01-21 PROCEDURE — 2500000002 HC RX 250 W HCPCS SELF ADMINISTERED DRUGS (ALT 637 FOR MEDICARE OP, ALT 636 FOR OP/ED)

## 2025-01-21 PROCEDURE — RXMED WILLOW AMBULATORY MEDICATION CHARGE

## 2025-01-21 PROCEDURE — 83735 ASSAY OF MAGNESIUM: CPT

## 2025-01-21 PROCEDURE — 2500000005 HC RX 250 GENERAL PHARMACY W/O HCPCS

## 2025-01-21 PROCEDURE — 85027 COMPLETE CBC AUTOMATED: CPT

## 2025-01-21 PROCEDURE — 97116 GAIT TRAINING THERAPY: CPT | Mod: GP

## 2025-01-21 RX ORDER — SERTRALINE HYDROCHLORIDE 50 MG/1
50 TABLET, FILM COATED ORAL DAILY
Status: CANCELLED | OUTPATIENT
Start: 2025-01-21

## 2025-01-21 RX ORDER — LAMOTRIGINE 25 MG/1
75 TABLET ORAL 2 TIMES DAILY
Status: DISCONTINUED | OUTPATIENT
Start: 2025-01-21 | End: 2025-01-23 | Stop reason: HOSPADM

## 2025-01-21 RX ORDER — METHOCARBAMOL 500 MG/1
500 TABLET, FILM COATED ORAL EVERY 6 HOURS PRN
Status: DISCONTINUED | OUTPATIENT
Start: 2025-01-21 | End: 2025-01-23 | Stop reason: HOSPADM

## 2025-01-21 RX ORDER — MODAFINIL 100 MG/1
200 TABLET ORAL
Status: DISCONTINUED | OUTPATIENT
Start: 2025-01-21 | End: 2025-01-23 | Stop reason: HOSPADM

## 2025-01-21 RX ORDER — LIDOCAINE 560 MG/1
1 PATCH PERCUTANEOUS; TOPICAL; TRANSDERMAL ONCE
Status: DISCONTINUED | OUTPATIENT
Start: 2025-01-21 | End: 2025-01-21

## 2025-01-21 RX ORDER — ORPHENADRINE CITRATE 30 MG/ML
60 INJECTION INTRAMUSCULAR; INTRAVENOUS ONCE
Status: DISCONTINUED | OUTPATIENT
Start: 2025-01-21 | End: 2025-01-21

## 2025-01-21 RX ORDER — IBUPROFEN 600 MG/1
600 TABLET ORAL EVERY 6 HOURS PRN
Status: DISCONTINUED | OUTPATIENT
Start: 2025-01-21 | End: 2025-01-23 | Stop reason: HOSPADM

## 2025-01-21 RX ORDER — LIDOCAINE 560 MG/1
1 PATCH PERCUTANEOUS; TOPICAL; TRANSDERMAL DAILY
Status: DISCONTINUED | OUTPATIENT
Start: 2025-01-21 | End: 2025-01-23 | Stop reason: HOSPADM

## 2025-01-21 RX ORDER — BUDESONIDE AND FORMOTEROL FUMARATE DIHYDRATE 160; 4.5 UG/1; UG/1
2 AEROSOL RESPIRATORY (INHALATION)
Status: DISCONTINUED | OUTPATIENT
Start: 2025-01-21 | End: 2025-01-21

## 2025-01-21 RX ORDER — GABAPENTIN 300 MG/1
300 CAPSULE ORAL NIGHTLY
Status: DISCONTINUED | OUTPATIENT
Start: 2025-01-21 | End: 2025-01-23 | Stop reason: HOSPADM

## 2025-01-21 RX ORDER — SERTRALINE HYDROCHLORIDE 50 MG/1
50 TABLET, FILM COATED ORAL DAILY
Status: DISCONTINUED | OUTPATIENT
Start: 2025-01-21 | End: 2025-01-23 | Stop reason: HOSPADM

## 2025-01-21 RX ORDER — ENOXAPARIN SODIUM 100 MG/ML
40 INJECTION SUBCUTANEOUS EVERY 24 HOURS
Status: DISCONTINUED | OUTPATIENT
Start: 2025-01-21 | End: 2025-01-23 | Stop reason: HOSPADM

## 2025-01-21 RX ORDER — FLUTICASONE FUROATE AND VILANTEROL 200; 25 UG/1; UG/1
1 POWDER RESPIRATORY (INHALATION)
Status: DISCONTINUED | OUTPATIENT
Start: 2025-01-21 | End: 2025-01-23 | Stop reason: HOSPADM

## 2025-01-21 RX ORDER — LIDOCAINE 560 MG/1
1 PATCH PERCUTANEOUS; TOPICAL; TRANSDERMAL DAILY
Qty: 7 PATCH | Refills: 0 | Status: SHIPPED | OUTPATIENT
Start: 2025-01-21

## 2025-01-21 RX ORDER — KETOROLAC TROMETHAMINE 15 MG/ML
15 INJECTION, SOLUTION INTRAMUSCULAR; INTRAVENOUS ONCE
Status: DISCONTINUED | OUTPATIENT
Start: 2025-01-21 | End: 2025-01-21

## 2025-01-21 RX ORDER — LAMOTRIGINE 100 MG/1
100 TABLET ORAL 2 TIMES DAILY
Status: DISCONTINUED | OUTPATIENT
Start: 2025-01-31 | End: 2025-01-23 | Stop reason: HOSPADM

## 2025-01-21 RX ORDER — ALBUTEROL SULFATE 90 UG/1
2 INHALANT RESPIRATORY (INHALATION) EVERY 4 HOURS PRN
Status: DISCONTINUED | OUTPATIENT
Start: 2025-01-21 | End: 2025-01-23 | Stop reason: HOSPADM

## 2025-01-21 RX ORDER — MODAFINIL 100 MG/1
200 TABLET ORAL 2 TIMES DAILY
Status: DISCONTINUED | OUTPATIENT
Start: 2025-01-21 | End: 2025-01-21

## 2025-01-21 RX ORDER — LEVETIRACETAM 500 MG/1
1000 TABLET ORAL 2 TIMES DAILY
Status: DISCONTINUED | OUTPATIENT
Start: 2025-01-21 | End: 2025-01-23 | Stop reason: HOSPADM

## 2025-01-21 RX ORDER — ACETAMINOPHEN 325 MG/1
650 TABLET ORAL EVERY 6 HOURS
Status: DISCONTINUED | OUTPATIENT
Start: 2025-01-21 | End: 2025-01-23 | Stop reason: HOSPADM

## 2025-01-21 RX ORDER — DEXTROAMPHETAMINE SACCHARATE, AMPHETAMINE ASPARTATE, DEXTROAMPHETAMINE SULFATE AND AMPHETAMINE SULFATE 2.5; 2.5; 2.5; 2.5 MG/1; MG/1; MG/1; MG/1
10 TABLET ORAL 2 TIMES DAILY
Status: DISCONTINUED | OUTPATIENT
Start: 2025-01-21 | End: 2025-01-21

## 2025-01-21 RX ORDER — DEXTROAMPHETAMINE SACCHARATE, AMPHETAMINE ASPARTATE, DEXTROAMPHETAMINE SULFATE AND AMPHETAMINE SULFATE 2.5; 2.5; 2.5; 2.5 MG/1; MG/1; MG/1; MG/1
10 TABLET ORAL
Status: DISCONTINUED | OUTPATIENT
Start: 2025-01-21 | End: 2025-01-23 | Stop reason: HOSPADM

## 2025-01-21 RX ADMIN — MODAFINIL 200 MG: 100 TABLET ORAL at 17:45

## 2025-01-21 RX ADMIN — FLUTICASONE FUROATE AND VILANTEROL TRIFENATATE 1 PUFF: 200; 25 POWDER RESPIRATORY (INHALATION) at 08:48

## 2025-01-21 RX ADMIN — LAMOTRIGINE 75 MG: 25 TABLET ORAL at 08:49

## 2025-01-21 RX ADMIN — GABAPENTIN 300 MG: 300 CAPSULE ORAL at 06:38

## 2025-01-21 RX ADMIN — IBUPROFEN 600 MG: 600 TABLET ORAL at 19:10

## 2025-01-21 RX ADMIN — LEVETIRACETAM 1000 MG: 500 TABLET, FILM COATED ORAL at 08:49

## 2025-01-21 RX ADMIN — METHOCARBAMOL 500 MG: 500 TABLET ORAL at 15:44

## 2025-01-21 RX ADMIN — DEXTROAMPHETAMINE SACCHARATE, AMPHETAMINE ASPARTATE, DEXTROAMPHETAMINE SULFATE AND AMPHETAMINE SULFATE 10 MG: 2.5; 2.5; 2.5; 2.5 TABLET ORAL at 08:48

## 2025-01-21 RX ADMIN — ACETAMINOPHEN 650 MG: 325 TABLET ORAL at 12:00

## 2025-01-21 RX ADMIN — ACETAMINOPHEN 650 MG: 325 TABLET ORAL at 17:10

## 2025-01-21 RX ADMIN — SERTRALINE 50 MG: 50 TABLET, FILM COATED ORAL at 12:00

## 2025-01-21 RX ADMIN — MODAFINIL 200 MG: 100 TABLET ORAL at 08:48

## 2025-01-21 RX ADMIN — ACETAMINOPHEN 650 MG: 325 TABLET ORAL at 23:06

## 2025-01-21 RX ADMIN — LIDOCAINE 4% 1 PATCH: 40 PATCH TOPICAL at 13:29

## 2025-01-21 RX ADMIN — GABAPENTIN 300 MG: 300 CAPSULE ORAL at 20:31

## 2025-01-21 RX ADMIN — LEVETIRACETAM 1000 MG: 500 TABLET, FILM COATED ORAL at 20:31

## 2025-01-21 RX ADMIN — BUPROPION 450 MG: 150 TABLET, EXTENDED RELEASE ORAL at 08:48

## 2025-01-21 RX ADMIN — LAMOTRIGINE 75 MG: 25 TABLET ORAL at 20:31

## 2025-01-21 RX ADMIN — DEXTROAMPHETAMINE SACCHARATE, AMPHETAMINE ASPARTATE, DEXTROAMPHETAMINE SULFATE AND AMPHETAMINE SULFATE 10 MG: 2.5; 2.5; 2.5; 2.5 TABLET ORAL at 17:45

## 2025-01-21 SDOH — ECONOMIC STABILITY: FOOD INSECURITY: WITHIN THE PAST 12 MONTHS, THE FOOD YOU BOUGHT JUST DIDN'T LAST AND YOU DIDN'T HAVE MONEY TO GET MORE.: SOMETIMES TRUE

## 2025-01-21 SDOH — ECONOMIC STABILITY: FOOD INSECURITY
WITHIN THE PAST 12 MONTHS, YOU WORRIED THAT YOUR FOOD WOULD RUN OUT BEFORE YOU GOT THE MONEY TO BUY MORE.: SOMETIMES TRUE

## 2025-01-21 SDOH — SOCIAL STABILITY: SOCIAL INSECURITY: HAVE YOU HAD THOUGHTS OF HARMING ANYONE ELSE?: NO

## 2025-01-21 SDOH — SOCIAL STABILITY: SOCIAL INSECURITY: ARE YOU MARRIED, WIDOWED, DIVORCED, SEPARATED, NEVER MARRIED, OR LIVING WITH A PARTNER?: NEVER MARRIED

## 2025-01-21 SDOH — SOCIAL STABILITY: SOCIAL INSECURITY
WITHIN THE LAST YEAR, HAVE YOU BEEN KICKED, HIT, SLAPPED, OR OTHERWISE PHYSICALLY HURT BY YOUR PARTNER OR EX-PARTNER?: NO

## 2025-01-21 SDOH — HEALTH STABILITY: PHYSICAL HEALTH: ON AVERAGE, HOW MANY MINUTES DO YOU ENGAGE IN EXERCISE AT THIS LEVEL?: 20 MIN

## 2025-01-21 SDOH — SOCIAL STABILITY: SOCIAL INSECURITY: WITHIN THE LAST YEAR, HAVE YOU BEEN HUMILIATED OR EMOTIONALLY ABUSED IN OTHER WAYS BY YOUR PARTNER OR EX-PARTNER?: NO

## 2025-01-21 SDOH — SOCIAL STABILITY: SOCIAL NETWORK: HOW OFTEN DO YOU ATTEND MEETINGS OF THE CLUBS OR ORGANIZATIONS YOU BELONG TO?: MORE THAN 4 TIMES PER YEAR

## 2025-01-21 SDOH — HEALTH STABILITY: PHYSICAL HEALTH
HOW OFTEN DO YOU NEED TO HAVE SOMEONE HELP YOU WHEN YOU READ INSTRUCTIONS, PAMPHLETS, OR OTHER WRITTEN MATERIAL FROM YOUR DOCTOR OR PHARMACY?: NEVER

## 2025-01-21 SDOH — SOCIAL STABILITY: SOCIAL NETWORK
DO YOU BELONG TO ANY CLUBS OR ORGANIZATIONS SUCH AS CHURCH GROUPS, UNIONS, FRATERNAL OR ATHLETIC GROUPS, OR SCHOOL GROUPS?: YES

## 2025-01-21 SDOH — SOCIAL STABILITY: SOCIAL INSECURITY: WITHIN THE LAST YEAR, HAVE YOU BEEN AFRAID OF YOUR PARTNER OR EX-PARTNER?: NO

## 2025-01-21 SDOH — ECONOMIC STABILITY: HOUSING INSECURITY: IN THE PAST 12 MONTHS, HOW MANY TIMES HAVE YOU MOVED WHERE YOU WERE LIVING?: 2

## 2025-01-21 SDOH — SOCIAL STABILITY: SOCIAL INSECURITY
WITHIN THE LAST YEAR, HAVE YOU BEEN RAPED OR FORCED TO HAVE ANY KIND OF SEXUAL ACTIVITY BY YOUR PARTNER OR EX-PARTNER?: NO

## 2025-01-21 SDOH — HEALTH STABILITY: MENTAL HEALTH: HOW OFTEN DO YOU HAVE SIX OR MORE DRINKS ON ONE OCCASION?: NEVER

## 2025-01-21 SDOH — SOCIAL STABILITY: SOCIAL NETWORK
IN A TYPICAL WEEK, HOW MANY TIMES DO YOU TALK ON THE PHONE WITH FAMILY, FRIENDS, OR NEIGHBORS?: MORE THAN THREE TIMES A WEEK

## 2025-01-21 SDOH — SOCIAL STABILITY: SOCIAL INSECURITY: WERE YOU ABLE TO COMPLETE ALL THE BEHAVIORAL HEALTH SCREENINGS?: YES

## 2025-01-21 SDOH — SOCIAL STABILITY: SOCIAL NETWORK: HOW OFTEN DO YOU GET TOGETHER WITH FRIENDS OR RELATIVES?: MORE THAN THREE TIMES A WEEK

## 2025-01-21 SDOH — HEALTH STABILITY: MENTAL HEALTH: HOW OFTEN DO YOU HAVE A DRINK CONTAINING ALCOHOL?: MONTHLY OR LESS

## 2025-01-21 SDOH — HEALTH STABILITY: MENTAL HEALTH: EXPERIENCED ANY OF THE FOLLOWING LIFE EVENTS: DEATH OF FAMILY/FRIEND;NATURAL DISASTER AFFECTING HOME OR WORK

## 2025-01-21 SDOH — SOCIAL STABILITY: SOCIAL NETWORK: HOW OFTEN DO YOU ATTEND CHURCH OR RELIGIOUS SERVICES?: NEVER

## 2025-01-21 SDOH — HEALTH STABILITY: MENTAL HEALTH
DO YOU FEEL STRESS - TENSE, RESTLESS, NERVOUS, OR ANXIOUS, OR UNABLE TO SLEEP AT NIGHT BECAUSE YOUR MIND IS TROUBLED ALL THE TIME - THESE DAYS?: NOT AT ALL

## 2025-01-21 SDOH — HEALTH STABILITY: MENTAL HEALTH: HOW MANY DRINKS CONTAINING ALCOHOL DO YOU HAVE ON A TYPICAL DAY WHEN YOU ARE DRINKING?: 1 OR 2

## 2025-01-21 SDOH — HEALTH STABILITY: PHYSICAL HEALTH: ON AVERAGE, HOW MANY DAYS PER WEEK DO YOU ENGAGE IN MODERATE TO STRENUOUS EXERCISE (LIKE A BRISK WALK)?: 7 DAYS

## 2025-01-21 SDOH — ECONOMIC STABILITY: INCOME INSECURITY: IN THE PAST 12 MONTHS HAS THE ELECTRIC, GAS, OIL, OR WATER COMPANY THREATENED TO SHUT OFF SERVICES IN YOUR HOME?: NO

## 2025-01-21 ASSESSMENT — COGNITIVE AND FUNCTIONAL STATUS - GENERAL
MOVING TO AND FROM BED TO CHAIR: A LITTLE
DAILY ACTIVITIY SCORE: 24
STANDING UP FROM CHAIR USING ARMS: A LITTLE
STANDING UP FROM CHAIR USING ARMS: A LITTLE
MOVING FROM LYING ON BACK TO SITTING ON SIDE OF FLAT BED WITH BEDRAILS: A LITTLE
CLIMB 3 TO 5 STEPS WITH RAILING: A LITTLE
DAILY ACTIVITIY SCORE: 24
MOVING TO AND FROM BED TO CHAIR: A LITTLE
WALKING IN HOSPITAL ROOM: A LITTLE
MOBILITY SCORE: 18
PATIENT BASELINE BEDBOUND: NO
WALKING IN HOSPITAL ROOM: A LITTLE
TURNING FROM BACK TO SIDE WHILE IN FLAT BAD: A LITTLE
MOBILITY SCORE: 22
MOBILITY SCORE: 22
CLIMB 3 TO 5 STEPS WITH RAILING: A LITTLE

## 2025-01-21 ASSESSMENT — ACTIVITIES OF DAILY LIVING (ADL)
HEARING - RIGHT EAR: FUNCTIONAL
BATHING: INDEPENDENT
WALKS IN HOME: UNABLE TO ASSESS
FEEDING YOURSELF: INDEPENDENT
JUDGMENT_ADEQUATE_SAFELY_COMPLETE_DAILY_ACTIVITIES: YES
TOILETING: INDEPENDENT
PATIENT'S MEMORY ADEQUATE TO SAFELY COMPLETE DAILY ACTIVITIES?: YES
EFFECT OF PAIN ON DAILY ACTIVITIES: DEBILITATING
GROOMING: INDEPENDENT
PATIENT'S MEMORY ADEQUATE TO SAFELY COMPLETE DAILY ACTIVITIES?: YES
ADEQUATE_TO_COMPLETE_ADL: YES
ASSISTIVE_DEVICE: CRUTCHES
HEARING - RIGHT EAR: FUNCTIONAL
TOILETING: INDEPENDENT
HEARING - LEFT EAR: FUNCTIONAL
BATHING: INDEPENDENT
ASSISTIVE_DEVICE: CRUTCHES
HEARING - LEFT EAR: FUNCTIONAL
WALKS IN HOME: INDEPENDENT
FEEDING YOURSELF: INDEPENDENT
GROOMING: INDEPENDENT
LACK_OF_TRANSPORTATION: NO
ADEQUATE_TO_COMPLETE_ADL: YES
ADL_ASSISTANCE: INDEPENDENT
DRESSING YOURSELF: INDEPENDENT
DRESSING YOURSELF: INDEPENDENT
JUDGMENT_ADEQUATE_SAFELY_COMPLETE_DAILY_ACTIVITIES: YES

## 2025-01-21 ASSESSMENT — PAIN SCALES - GENERAL
PAINLEVEL_OUTOF10: 7
PAINLEVEL_OUTOF10: 8
PAINLEVEL_OUTOF10: 6
PAINLEVEL_OUTOF10: 8
PAINLEVEL_OUTOF10: 8
PAINLEVEL_OUTOF10: 5 - MODERATE PAIN
PAINLEVEL_OUTOF10: 0 - NO PAIN

## 2025-01-21 ASSESSMENT — PAIN - FUNCTIONAL ASSESSMENT
PAIN_FUNCTIONAL_ASSESSMENT: 0-10

## 2025-01-21 ASSESSMENT — PATIENT HEALTH QUESTIONNAIRE - PHQ9
SUM OF ALL RESPONSES TO PHQ9 QUESTIONS 1 & 2: 0
2. FEELING DOWN, DEPRESSED OR HOPELESS: NOT AT ALL
1. LITTLE INTEREST OR PLEASURE IN DOING THINGS: NOT AT ALL

## 2025-01-21 ASSESSMENT — PAIN DESCRIPTION - PAIN TYPE: TYPE: ACUTE PAIN

## 2025-01-21 ASSESSMENT — LIFESTYLE VARIABLES
SKIP TO QUESTIONS 9-10: 1
SUBSTANCE_ABUSE_PAST_12_MONTHS: YES
PRESCIPTION_ABUSE_PAST_12_MONTHS: NO
AUDIT-C TOTAL SCORE: 1

## 2025-01-21 ASSESSMENT — PAIN DESCRIPTION - DESCRIPTORS
DESCRIPTORS: ACHING;THROBBING;TENDER;STABBING
DESCRIPTORS: ACHING

## 2025-01-21 NOTE — PROGRESS NOTES
Physical Therapy    Physical Therapy Evaluation & Treatment    Patient Name: Spenser Ramirez  MRN: [id number]  Department: [facility]  Room: [facility]4062-A  Today's Date: 1/21/2025   Time Calculation  Start Time: 1542  Stop Time: 1631  Time Calculation (min): 49 min    Assessment/Plan   PT Assessment  PT Assessment Results: Decreased strength, Decreased range of motion, Decreased endurance, Impaired balance, Decreased mobility, Pain  Rehab Prognosis: Excellent  Barriers to Discharge Home: No anticipated barriers  Evaluation/Treatment Tolerance: Patient limited by fatigue, Patient limited by pain  Medical Staff Made Aware: Yes  Strengths: Attitude of self, Coping skills, Physical health  Barriers to Participation: Comorbidities  End of Session Communication: Bedside nurse, Care Coordinator  Assessment Comment: Pt presented with a slightly unsteady gait using Loftstrand crutches, but safe and appropriate for low intensity outpatient therapy after d/c.  End of Session Patient Position: Bed, 3 rail up, Alarm off, caregiver present   IP OR SWING BED PT PLAN  Inpatient or Swing Bed: Inpatient  PT Plan  Treatment/Interventions: Bed mobility, Transfer training, Gait training, Stair training, Balance training, Strengthening, Endurance training, Range of motion, Therapeutic exercise, Therapeutic activity, Home exercise program  PT Plan: Ongoing PT  PT Frequency: 5 times per week  PT Discharge Recommendations: Low intensity level of continued care, Other (comment) (Outpatient PT)  Equipment Recommended upon Discharge:  (none)  PT Recommended Transfer Status: Stand by assist  PT - OK to Discharge: Yes      Subjective     General Visit Information:  General  Reason for Referral: Seizure-like activity and LE pain  Past Medical History Relevant to Rehab: Asthma, fibromyalgia, depression, idiopathic hypersomnia, paroxysmal events with breakthrough seizure  Family/Caregiver Present: Yes  Caregiver Feedback: Friend present with good  support.  Prior to Session Communication: Bedside nurse, Care Coordinator  Patient Position Received: Bed, 3 rail up, Alarm off, not on at start of session  Preferred Learning Style: verbal, visual, written  General Comment: Pt was pleasant, cooperative and willing to participate in therapy.  Home Living:  Home Living  Type of Home: Apartment  Lives With: Alone  Home Adaptive Equipment: Crutches (Loftstrand crutches)  Home Layout: One level  Home Access: Elevator  Bathroom Shower/Tub: Tub/shower unit  Bathroom Toilet: Standard  Bathroom Equipment: Grab bars in shower, Tub transfer bench  Prior Level of Function:  Prior Function Per Pt/Caregiver Report  Level of [place]: Independent with ADLs and functional transfers, Independent with homemaking with ambulation  Receives Help From: Family, Friends  ADL Assistance: Independent  Homemaking Assistance: Independent  Ambulatory Assistance: Independent  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  Precautions:  Precautions  Hearing/Visual Limitations: Hearing and vision WFL with glasses.  Medical Precautions: Fall precautions, Seizure precautions  Precautions Comment: Pt in compliance with precautions throughout therapy session.    Vital Signs (Past 2hrs)        Date/Time Vitals Session Patient Position Pulse Resp SpO2 BP MAP (mmHg)    01/21/25 1542 Pre PT  Lying  84  --  98 %  --  --                   Vital Signs Comment: vitals stable    Objective   Pain:  Pain Assessment  Pain Assessment: 0-10  0-10 (Numeric) Pain Score: 8  Pain Type: Acute pain  Pain Location: Leg  Pain Orientation: Right  Pain Descriptors: Aching, Throbbing, Tender, Stabbing  Pain Frequency: Constant/continuous  Pain Onset: Ongoing  Clinical Progression: Gradually worsening (with movement)  Effect of Pain on Daily  Activities: Debilitating  Patient's Stated Pain Goal: No pain  Pain Interventions: Ambulation/increased activity, Therapeutic presence  Response to Interventions: Increase in pain  Cognition:  Cognition  Overall Cognitive Status: Within Functional Limits  Orientation Level: Oriented X4  Following Commands: Follows all commands and directions without difficulty  Safety Judgment: Good awareness of safety precautions  Problem Solving: Able to problem solve independently    General Assessments:  General Observation  General Observation: Pt presented with a slightly unsteady gait using Loftstrand crutches.     Activity Tolerance  Endurance: Decreased tolerance for upright activites    Sensation  Light Touch: No apparent deficits    Strength  Strength Comments: R LE decreased strength  Perception  Inattention/Neglect: Appears intact    Coordination  Movements are Fluid and Coordinated: Yes  Coordination Comment: WFL    Postural Control  Postural Control: Within Functional Limits  Posture Comment: Pt presented with good sitting posture and good standing posture using Loftstrand crutches.    Static Sitting Balance  Static Sitting-Balance Support: No upper extremity supported, Feet supported  Static Sitting-Level of Assistance: Distant supervision  Static Sitting-Comment/Number of Minutes: Sitting EOB  Dynamic Sitting Balance  Dynamic Sitting-Balance Support: No upper extremity supported, Feet supported  Dynamic Sitting-Level of Assistance: Distant supervision  Dynamic Sitting-Comments: Sitting EOB    Static Standing Balance  Static Standing-Balance Support: Bilateral upper extremity supported  Static Standing-Level of Assistance: Close supervision  Static Standing-Comment/Number of Minutes: using Loftstrand crutches  Dynamic Standing Balance  Dynamic Standing-Balance Support: Bilateral upper extremity supported  Dynamic Standing-Level of Assistance: Close supervision  Dynamic Standing-Comments: using Loftstrand  crutches  Functional Assessments:  Bed Mobility  Bed Mobility: Yes  Bed Mobility 1  Bed Mobility 1: Supine to sitting  Level of Assistance 1: Distant supervision  Bed Mobility Comments 1: long sitting pivot  Bed Mobility 2  Bed Mobility  2: Sitting to supine  Level of Assistance 2: Distant supervision  Bed Mobility Comments 2: long sitting pivot    Transfers  Transfer: Yes  Transfer 1  Transfer From 1: Sit to  Transfer to 1: Stand  Transfer Device 1: Crutches  Transfer Level of Assistance 1: Close supervision  Transfers 2  Transfer From 2: Stand to  Transfer to 2: Sit  Transfer Device 2: Crutches  Transfer Level of Assistance 2: Close supervision    Ambulation/Gait Training  Ambulation/Gait Training Performed: Yes  Ambulation/Gait Training 1  Surface 1: Level tile  Device 1: Forearm crutches  Assistance 1: Close supervision  Quality of Gait 1: Inconsistent stride length, Decreased step length, Antalgic, Soft knee(s) (slightly unsteady, toe touch weight bearing, step-to gait, decreased endurance)  Comments/Distance (ft) 1: 10ft    Stairs  Stairs: No  Extremity/Trunk Assessments:  Cervical Spine   Cervical Spine: Within Functional Limits  Lumbar Spine   Lumbar Spine : Within Functional Limits    RUE   RUE : Within Functional Limits  LUE   LUE: Within Functional Limits  RLE   RLE : Exceptions to WFL  AROM RLE (degrees)  RLE AROM Comment: Decreased hip and knee flexion  Strength RLE  R Hip Flexion: 3-/5  R Knee Flexion: 3-/5  R Knee Extension: 3-/5  R Ankle Dorsiflexion: 4+/5  R Ankle Plantar Flexion: 4+/5  LLE   LLE : Within Functional Limits  Treatments:     Bed Mobility  Bed Mobility: Yes  Bed Mobility 1  Bed Mobility 1: Supine to sitting  Level of Assistance 1: Distant supervision  Bed Mobility Comments 1: long sitting pivot  Bed Mobility 2  Bed Mobility  2: Sitting to supine  Level of Assistance 2: Distant supervision  Bed Mobility Comments 2: long sitting pivot    Ambulation/Gait Training  Ambulation/Gait Training  Performed: Yes  Ambulation/Gait Training 1  Surface 1: Level tile  Device 1: Forearm crutches  Assistance 1: Close supervision  Quality of Gait 1: Inconsistent stride length, Decreased step length, Antalgic, Soft knee(s) (slightly unsteady, toe touch weight bearing, step-to gait, decreased endurance)  Comments/Distance (ft) 1: 10ft  Transfers  Transfer: Yes  Transfer 1  Transfer From 1: Sit to  Transfer to 1: Stand  Transfer Device 1: Crutches  Transfer Level of Assistance 1: Close supervision  Transfers 2  Transfer From 2: Stand to  Transfer to 2: Sit  Transfer Device 2: Crutches  Transfer Level of Assistance 2: Close supervision    Stairs  Stairs: No  Outcome Measures:  [facility] Basic Mobility  Turning from your back to your side while in a flat bed without using bedrails: A little  Moving from lying on your back to sitting on the side of a flat bed without using bedrails: A little  Moving to and from bed to chair (including a wheelchair): A little  Standing up from a chair using your arms (e.g. wheelchair or bedside chair): A little  To walk in hospital room: A little  Climbing 3-5 steps with railing: A little  Basic Mobility - Total Score: 18    Encounter Problems       Encounter Problems (Active)       Balance       STG - Maintains independent dynamic standing balance with upper extremity support using Loftstrand crutches. (Progressing)       Start:  01/21/25    Expected End:  02/04/25            STG - Maintains independent static standing balance with upper extremity support using Loftstrand crutches. (Progressing)       Start:  01/21/25    Expected End:  02/04/25               Mobility       STG - Patient will ambulate 125ft, independently using Loftstrand crutches. (Progressing)       Start:  01/21/25    Expected End:  02/04/25            STG - Patient will ascend and descend a flight of stairs, independently using Loftstrand crutches. (Progressing)       Start:  01/21/25    Expected End:  02/04/25                PT Transfers       STG - Transfer from bed to chair, independently using Loftstrand crutches. (Progressing)       Start:  01/21/25    Expected End:  02/04/25            STG - Patient to transfer to and from sit to supine, independently. (Progressing)       Start:  01/21/25    Expected End:  02/04/25            STG - Patient will transfer sit to and from stand, independently using Loftstrand crutches. (Progressing)       Start:  01/21/25    Expected End:  02/04/25                   Education Documentation  Body Mechanics, taught by Art Collins, PT at 1/21/2025  5:31 PM.  Learner: Other, Patient  Readiness: Acceptance  Method: Explanation, Demonstration  Response: Verbalizes Understanding, Demonstrated Understanding  Comment: Pt received education on d/c recommendations.    Home Exercise Program, taught by Art Collins, PT at 1/21/2025  5:31 PM.  Learner: Other, Patient  Readiness: Acceptance  Method: Explanation, Demonstration  Response: Verbalizes Understanding, Demonstrated Understanding  Comment: Pt received education on d/c recommendations.    Mobility Training, taught by Art Collins, PT at 1/21/2025  5:31 PM.  Learner: Other, Patient  Readiness: Acceptance  Method: Explanation, Demonstration  Response: Verbalizes Understanding, Demonstrated Understanding  Comment: Pt received education on d/c recommendations.    Education Comments  No comments found.

## 2025-01-21 NOTE — DISCHARGE SUMMARY
Discharge Diagnosis  Seizure (Multi)    First Time Seizures  No this is not the patient's first seizure  Is this patient seizure free?  No, The risks and benefits of additional treatment were discussed with the patient and/or family, and no changes will be made at this time.   Should this patient observe standard seizure precautions?  Yes Reviewed seizure precautions with patient; specifically, the patient may not drive, may not operate heavy machinery, ought not swim unsupervised, should shower rather than bath, should be cautious with hot or heavy objects, and should not perform any activities at heights such as on a ladder. This will be re-assessed at the patient's next appointment.     Issues Requiring Follow-Up  - Continue Lamotrigine titration schedule. Continue Keppra 1g bid  - Take 3 tablets of 25mg lamotrigen BID for 2 weeks ( 1/18- 1/31) then increase to 4 tablets 25mg BID till next follow-up   - Continue Keppra 1000mg bid  - Close virtual follow-up with Alisa Houston (scheduled 1/24/2025)     Test Results Pending At Discharge  Pending Labs       No current pending labs.            Hospital Course   Patient was briefly admitted to the Neurology epilepsy service. Was initially declining admission after being seen as a consult in the ED for breakthrough paroxysmal event. Ultimately, after remaining in the ED for greater than 24 hours secondary to hip pain, patient was agreeable for admission. Upon arrival to the floor in the EMU, patient expressed frustration where they believed they were being admitted for their hip pain. Declined further neurologic workup. Xray & CT R Hip were unremarkable.    I explained that the hip xray obtained in the ED was negative for any fractures and performed a thorough assessment of the RLE. Right hip was nontender to palpation with no visible bruising, signs of injury, or palpable hematoma. Muscle strength testing was initially with low effort and pain limited however with  repeat encouragement was 5/5 tested throughout the lower extremity. No sensory deficits. Pulses intact with warm feet.    I explained to the patient these findings and that the hip injury was likely secondary to the paroxysmal event given it occurred after last episode. With reassuring workup and examination there was no need for further imaging and workup. I offered to continue to treat the patient's pain and an evaluation with physical therapy however they declined and stated they would like to leave to go to a different hospital. Patient was ultimately discharged.    Pertinent Physical Exam At Time of Discharge  Physical Exam    Home Medications     Medication List      START taking these medications     nasal spray Nayzilam 5 mg/spray (0.1 mL) spray,non-aerosol; Generic   drug: midazolam; Administer 1 spray into affected nostril(s) 1 time for 1   dose.     CHANGE how you take these medications     amphetamine-dextroamphetamine 10 mg tablet; Commonly known as: AdderalL;   Take 1 tablet (10 mg) by mouth 2 times a day.; What changed: Another   medication with the same name was removed. Continue taking this   medication, and follow the directions you see here.   fluticasone propion-salmeteroL 250-50 mcg/dose diskus inhaler; Commonly   known as: Advair Diskus; Inhale 1 puff 2 times a day. Rinse mouth with   water after use to reduce aftertaste and incidence of candidiasis. Do not   swallow.; What changed: when to take this, reasons to take this   levETIRAcetam 1,000 mg tablet; Commonly known as: Keppra; Take 1 tablet   (1,000 mg) by mouth 2 times a day.; What changed: medication strength, how   much to take     CONTINUE taking these medications     acetaminophen 500 mg tablet; Commonly known as: Tylenol   * albuterol 90 mcg/actuation inhaler; Inhale 2 puffs every 4 hours if   needed.   * albuterol 2.5 mg/0.5 mL nebulizer solution; USE ONE DOSE EVERY 4 HOURS   AS NEEDED FOR SHORTNESS OF BREATH   budesonide-formoteroL  160-4.5 mcg/actuation inhaler; Commonly known as:   Symbicort   buPROPion  mg 24 hr tablet; Commonly known as: Wellbutrin XL; Take   3 tablets (450 mg) by mouth once daily.   ferrous sulfate (325 mg ferrous sulfate) tablet; Take 1 tablet (325 mg)   by mouth once daily.   gabapentin 300 mg capsule; Commonly known as: Neurontin; Take 1 capsule   (300 mg) by mouth once daily at bedtime.   humidifiers misc; 1 Units once daily as needed (for shortness of breath,   wheezing).   ibuprofen 200 mg tablet   ipratropium 17 mcg/actuation inhaler; Commonly known as: Atrovent   * lamoTRIgine 25 mg tablet; Commonly known as: LaMICtal; Take 3 tablets   (75 mg) by mouth 2 times a day for 14 days, THEN 4 tablets (100 mg) 2   times a day for 14 days.; Start taking on: January 17, 2025   * lamoTRIgine 100 mg tablet; Commonly known as: LaMICtal; Take 1 tablet   (100 mg) by mouth 2 times a day. Do not fill before February 14, 2025.;   Start taking on: February 14, 2025   modafinil 200 mg tablet; Commonly known as: Provigil; Take 1 tablet (200   mg) by mouth 2 times a day.   sertraline 50 mg tablet; Commonly known as: Zoloft  * This list has 4 medication(s) that are the same as other medications   prescribed for you. Read the directions carefully, and ask your doctor or   other care provider to review them with you.       Outpatient Follow-Up  Future Appointments   Date Time Provider Department Center   [facility]20[facility]   1/24/2025  1:00 PM Alisa Houston, APRN-CNP [id number] Academic   2/3/2025  3:40 PM Omayra Buitrago MD MPH [id number] Academic   2/13/2025  1:30 PM Ezekiel Aguilera MD [id number] Academic   2/25/2025  9:00 AM mOayra Buitrago MD MPH [id number] Academic   4/23/2025  3:00 PM Maurice Aleman MD [id number] Academic       Zaheer Cordova DO

## 2025-01-21 NOTE — PROGRESS NOTES
"   01/21/25 8014   Discharge Planning   Living Arrangements Alone   Support Systems Friends/neighbors   Assistance Needed Pending therapy recommendations.   Type of Residence Other (Comment)  ([facility] residence teran.)   Do you have animals or pets at home? Yes   Type of Animals or Pets Cat   Who is requesting discharge planning? Patient   Home or Post Acute Services   (Pending PT/OT evaluations.)   Expected Discharge Disposition   (Pending therapy recommendations.)   Does the patient need discharge transport arranged? Yes   RoundTrip coordination needed? Yes   Has discharge transport been arranged? No   Financial Resource Strain   How hard is it for you to pay for the very basics like food, housing, medical care, and heating? Not very   Housing Stability   In the last 12 months, was there a time when you were not able to pay the mortgage or rent on time? N   At any time in the past 12 months, were you homeless or living in a shelter (including now)? N   Transportation Needs   In the past 12 months, has lack of transportation kept you from medical appointments or from getting medications? no   In the past 12 months, has lack of transportation kept you from meetings, work, or from getting things needed for daily living? No   Intensity of Service   Intensity of Service 0-30 min     Assessment Note:  Met with pt and introduced myself as care coordinator and member of the Care Transitions team for discharge planning.   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  Pt was also working full-time in \"[employer] [place]\".  Pt will need a return to work/school slip.  Pt was able to walk to  for any drs appts.  Pt's address, phone number and contact information was verified.  Pt complains of pain not being controlled and inability to ambulate (PT and OT evaluations have been ordered).  Pt is also requesting to an ortho consult.  Pt was tearful during this conversation and states she may have to leave this hospital " and go to another for treatment.  Pt does not have any other questions/concerns at this time.     Previous Home Care: None  DME: Forearm crutches.  Pharmacy: [facility].  Falls: Denies  PCP:   Dr. Omayra Buitrago (last visit was early 11/2024).    Gisela Hilliard MSN, RN-BC  Transitional Care Coordinator (TCC)  [phone]

## 2025-01-21 NOTE — SIGNIFICANT EVENT
"Epilepsy - Brief Update Note    Lisa Ramirez \"Spenser\" is a 19-year-old gender-queer patient (preferred pronouns \"he/they;\" assigned female sex at birth) with history notable for asthma, fibromyalgia, depression, idiopathic hypersomnia vs narcolepsy, and paroxysmal events who presented to the ED with possible breakthrough seizure episodes. Neurology was initially consulted for the evaluation for breakthrough seizure episodes.      Previously the patient presented to [facility] ED on 1/16/2025 after left-sided clonic activity. During that admission, MRI brain w/o contrast showed no right hemisphere. Per Radiology report, slightly thickened left hippocampal body and tail with T2/FLAIR hyperintensity. EEG on 1/17/2025 showed no epileptiform activity.    This admission, patient presented with right-sided clonic activity not responsive to lamotrigine at increased dose of 100 mg BID and concurrent levetiracetam at 750 mg BID. Levetiracetam and lamotrigine values on admission 21 and 3.1, respectively, which suggests this presentation is less likely due to medication nonadherance. From the limited neurological examination on admission, patient did not have signs of lateral tongue biting. Mild leukocytosis (12.5) and normal venous lactate (2).      Patient initially declined admission for further monitoring however after remaining in the ED for >24 hours, he continued to have difficulty walking due to hip pain. Patient agreed with admission until overnight, patient again refused EMU monitoring, believing this admission to be for hip pain alone. PT/OT ordered. Acetaminophen, ibuprofen, and methocarbamol were offered for analgesia. Patient refused opioids for acute pain management.     4D Classification of the Paroxysmal Episodes  Unspecified paroxysmal events  Episodes semiology:   1) Olfactory aura -> Sensory aura -> LUE clonic (D) -> LLE clonic -> GTC   2) Facial twitching and left arm jerking movements   3) RLE jerking -> " BLE jerking -> GTC  Onset/Frequency: 11 yo / inconsistent frequency, recently 1/16, 1/18, and 1/20  Localization: Unknown  Etiology: Unknown   Co-morbidities: Depression  ASM:  mg BID,  mg BID    Updates (1/21)  - PT/OT ordered  - Acetaminophen, ibuprofen, and methocarbamol started for analgesia    #Hip Pain, right  : : Reported hip pain with inability to stand or walk following paroxysmal event prior to admission  : : CT right hip w/o contrast (1/20) normal  - Pain regimen:   - Acetaminophen 650 mg Q6H for mild-to-moderate pain  - Ibuprofen 600 mg Q6H PRN for severe pain  - Methocarbamol 500 mg Q6H PRN for muscle spasms    #Breakthrough Paroxysmal Events  #Epilepsy vs PNES  : : Home lamotrigine 750 mg BID and levetiracetam 1 g BID  : : MRI brain seizure protocol (1/17) showed slightly thickened left hippocampal body and tail with elevated T2 and FLAIR hyperintensity  : : Patient refused EMU admission  - Continue lamotrigine 75 mg BID  - Continue levetiracetam 1000 mg BID  - Ativan 2 mg IV for prolonged motor seizure lasting more than 3 minutes or a cluster of 3 or more motor seizures in an 8 hour period.     #Hypersomnia vs Narcolepsy  - Continue home amphetamine-dextroamphetamine 10 mg BID  - Continue home modafinil 200 mg BID     #Depression  #Fibromyalgia  - Continue home bupropion 450mg  - Continue gabapentin 300 mg daily     #Asthma  - Continue home inhalers      F: PRN  E: PRN  N: Regular  A: PIV  GI: None  DVT: Enoxaparin     Code Status: Full Code  Surrogate Decision-Maker: Roommate (David Levi [phone])    Anthony Lopez MD  PGY-1, Neurology  Epilepsy: [id number]

## 2025-01-21 NOTE — H&P
"Updates:  While in the ED patient remained having difficulty ambulating secondary to pain. Patient now amenable for admission. Will admit to neurology Epilepsy for further cvEEG monitoring and characterization of events.     Current patient remains sleepy but easy to arouse. Will wake up briefly to answer questions with short answers. Appears overall comfortable.     History Of Present Illness  Lisa Ramirez \"Spenser\" is a 19 y.o. 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 Neurology was consulted for the evaluation for breakthrough seizure episodes.      The patient recently visited [facility] ED on 1/16/2025 after the patient was witnessed to have an LUE clonic activity that followed by LLE clonic activity and then to GTC lasting ~ 8min. During his stay, MRI brain epilepsy protocol was performed. The MRI brain showed no no gross abnormalities in the right hemisphere. Per Radiology report, slightly thickened left hippocampal body and tail with T2/FLAIR hyperintensity. EEG on 1/17/2025 showed no epileptiform activity. On 1/18/2025, patient was discharged with the goal of increasing lamotrigine to 100mg BID while starting levitiracetam at 750mg BID; neurology follow-up with Dr. Aleman; and EMU admission scheduled in late May 2025 after his academic school year.      On 1/20/25 the patient is with his friends. During the encounter, the patient remained drowsy, so friends provided most of the history. Friends witnessed at least three \"big\" seizures and several \"small\" seizures since the discharge. On the day he was discharged (1/18/2025), he had two seizures which lasted less than 5min each. During each episode, the patient developed RLE followed by RUE clonic activity. The most recent one " "occurred around 1:40 am 1/20/2025. Friends noticed him having RLE clonic activity which developed into bilateral LE clonic activity followed by GTC. This episode lasted ~10 min which triggered friends to bring the patient to the ED. The patient did not have urinary incontinence, lip biting and tongue biting. Additionally, the friends reports that the patient has \"small\" seizures when he falls asleep (facial twitching and left arm jerking movements) and throughout the day (twitching of RLE). Friends endorse that the patient has been taking the new medication regimen as instructed.      Previous Neurologic History (Summarized from previous ED visit note)  Briefly, the patient was first seen in neurology clinic on 1/31/24 for his daytime sleepiness and paroxysmal events. His daytime sleepiness started in highschool and gradually progressed. He often dreams initially with LOC which was endorsed by his friends. Regarding paroxysmal events, patient endorsed episodes of foul smell followed by LOC and diffuse motor actions since childhood. Per patient, he found himself waking up on th floor with all muscles hurting after LOC. He often had minor injuries. Usually has urinary incontinence and has once or twice awakened with blood in the mouth, but does not have stimata of tongue biting.    Past Medical History  Past Medical History:   Diagnosis Date    Asthma     Exercise induced bronchospasm ([facility])     Asthma, exercise induced    Personal history of diseases of the blood and blood-forming organs and certain disorders involving the immune mechanism     History of iron deficiency anemia    Personal history of other mental and behavioral disorders     History of depression    Personal history of other mental and behavioral disorders     History of OCD (obsessive compulsive disorder)    Personal history of other specified conditions     History of pseudoseizure     Surgical History  No past surgical history on file.  Social " "History  Social History     Tobacco Use    Smoking status: Never    Smokeless tobacco: Never   Vaping Use    Vaping status: Never Used   Substance Use Topics    Alcohol use: Yes    Drug use: Yes     Types: Marijuana     Comment: just a little     Allergies  Patient has no known allergies.  (Not in a hospital admission)      Review of Systems  Neurological Exam  Physical Exam  NEUROLOGICAL:  A limited neurological examination was performed d/t patient's altered level of consciousness. The patient was asleep and drowsy. The patient woke up to vocal stimulus (calling his name). When he was awake he answered orientation questions correctly (AO 3x person, time, and place). He was able to follow simple commands like \"show us your thumb\" and \"lift your left leg\".       Cranial Nerves & Reflexes:  CN II: Eyes are closed. When eyelids are opened, pupils are aligned and gaze remains stationary in primary position midline. Extraocular movements intact bilaterally. No nystagmus. Normal saccades. Normal smooth pursuit.      Pupillary (II;III): Intact     CN V: Facial sensation is normal.   CN XII: Tongue midline without atrophy or fasciculations.      Sensorimotor:               Bulk: Normal              Tone: Normal              Tremor: Absent                               Reflexes:                           R          L  Biceps              2+        2+  Brachioradialis2+2+  Patellar             2+        2+  Achilles2+2+     Toes: flexor plantar bilaterally  Torres's: absent  Ankle Clonus: absent   Last Recorded Vitals  Blood pressure 109/80, pulse 89, temperature 36.7 °C (98.1 °F), temperature source Temporal, resp. rate 18, SpO2 98%.    Relevant Results                    [place] Coma Scale  Best Eye Response: Spontaneous  Best Verbal Response: Oriented  Best Motor Response: Follows commands  [place] Coma Scale Score: 15                 I have personally reviewed the following imaging results CT hip right wo IV " contrast    Result Date: 1/20/2025  Interpreted By:  Kareen Magallanes and Jiang Sirui STUDY: CT HIP RIGHT WO IV CONTRAST;  1/20/2025 7:51 pm   INDICATION: Signs/Symptoms:Pain after seizure, unable to ambulate.   COMPARISON: Right hip radiographs 01/20/2025   ACCESSION NUMBER(S): [id number]   ORDERING CLINICIAN: CLAUDIO LANDEROS   TECHNIQUE: Contiguous axial CT images of the  right hip were obtained  without contrast. Sagittal and coronal reconstructions were created.   FINDINGS: OSSEOUS STRUCTURES: No acute fracture or malalignment. No significant degenerative changes. No osseous destruction or abnormal periosteal bone formation.   SOFT TISSUES: No soft tissue abnormality is noted.       Normal CT of the right hip.   I personally reviewed the image(s) / study and I agree with the findings as stated by Marcie Obando MD. This study was interpreted at [place], [place].   MACRO: None.   Signed by: Kareen Magallanes 1/20/2025 9:22 PM Dictation workstation:   [id number]    XR hand left 3+ views    Result Date: 1/20/2025  Interpreted By:  Deejay Arechiga, STUDY: XR HAND LEFT 3+ VIEWS   INDICATION: Signs/Symptoms:Left thumb pain after seizure, possible dislocation.   COMPARISON: None   ACCESSION NUMBER(S): [id number]   ORDERING CLINICIAN: CLAUDIO LANDEROS   FINDINGS: No osseous, articular, or soft tissue abnormality.       Normal radiographs of the left hand. Thumb unremarkable.   Signed by: Deejay Arechiga 1/20/2025 5:40 PM Dictation workstation:   [id number]    XR hip right with pelvis when performed 4+ views    Result Date: 1/20/2025  Interpreted By:  Kenia Box, STUDY: Single view pelvis. Right hip, two views.   INDICATION: Signs/Symptoms:c/f subluxed hip with seizure.   COMPARISON: None.   ACCESSION NUMBER(S): [id number]   ORDERING CLINICIAN: NELY DUPONT   FINDINGS: No acute fracture or malalignment. Bilateral hip joint spaces are well preserved. Soft tissues are within normal limits.    "    1. Unremarkable pelvis and right hip radiographs.   MACRO: None.   Signed by: Kenia Box 1/20/2025 11:15 AM Dictation workstation:   [id number]    EEG    IMPRESSION This vEEG is normal. No epileptiform discharges or lateralizing signs are seen. A full report will be scanned into the patient's chart at a later time. This report has been interpreted and electronically signed by    MR brain wo IV contrast    Result Date: 1/17/2025  Interpreted By:  Radha Murdock, STUDY: MR BRAIN WO IV CONTRAST; 1/17/2025 9:47 am   INDICATION: Signs/Symptoms:Acute Seizure Protocol.   COMPARISON: None.   ACCESSION NUMBER(S): [id number]   ORDERING CLINICIAN: RACHEL ESPINOZA   TECHNIQUE: Multiplanar multisequence MR imaging of brain was performed without intravenous contrast administration.   FINDINGS: There is no diffusion restriction abnormality to suggest acute infarct.   There is asymmetric mildly increased T2 and FLAIR hyperintensity involving the left hippocampal region, particularly involving the body and tail with very faint diffusion bright signal. The T1-T2 measurements in this region based on finger printing are also elevated as compared to contralateral side. There is blurring of the left hippocampal architecture with mild swelling of the left hippocampal region as compared to right.   The ventricles, sulci and basal cisterns are within normal limits. There is no focal parenchymal abnormality.   Visualized paranasal sinuses and bilateral mastoids are clear.       Slightly thickened left hippocampal body and tail with elevated T2 and FLAIR hyperintensity. These may reflect sequela of recent seizure activity. The other differential considerations such as sequela of acute ischemia, encephalitis/cerebritis are considered less likely.   Signed by: Radha Murdock 1/17/2025 12:14 PM Dictation workstation:   [id number]  .      Assessment/Plan   Assessment & Plan      Lisa Ramirez \"Spenser\" is a 19 y.o. 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 Narcolepsy and paroxysmal events presenting to the ED with possible breakthrough seizure episodes. Neurology was consulted for the evaluation for breakthrough seizure episodes.      Previously the patient presented to [facility] ED on 1/16/2025 after left sided clonic activity. During his stay MRI brain showed no no gross abnormalities in the right hemisphere. Per Radiology report, slightly thickened left hippocampal body and tail with T2/FLAIR hyperintensity. EEG on 1/17/2025 showed no epileptiform activity. This time the patient presents with right sided clonic activity that is not responding to increased dose of lamotrigine to 100mg BID while starting levitiracetam at 750mg BID. Levetiracetam and lamotrigine values from today's ED visit are 21 and 3.1, respectively. This suggest that his seizure-like episode is less likely due to medication nonadherance. From the limited neurological examination, the patient was noted to not have any signs of lateral tong biting from his seizure-like episodes. Post-ictal lab analyses were unrevealing. WBC was mildly elevated (12.5) and venous lactate was normal (2).     Patient initially declined admission for further monitoring however after remaining in the ED for >24 hours, he was continuing to have difficulty ambulating secondary to hip pain. Now amenable for admission. Thus we will admit him to our service for further cvEEG monitoring and characterization of events.      4D Classification of the Paroxysmal Episodes:  Unspecified paroxysmal events  Episodes semiology:   1) Olfactory Aura -> Sensory aura -> LUE clonic (D) -> LLE clonic -> GTC   2) facial twitching and left arm jerking movements   3) RLE jerking -> BLE jerking -> gtc  Onset/Frequency: 11yo / inconsistent frequency. 1/16, 1/18, 1/20.   Localization: " Unknown  Etiology: unknown   Co-morbidities: Depression  ASM: Keppra 750mg bid, LTG 100mg bid          #Breakthrough paroxysmal events  #C/f Epilepsy vs PNES  : : Home lamotrigine 750mg bid and Keppra 1g bid  : : MRI brain seizure protocol (1/17) showed slightly thickened left hippocampal body and tail with elevated T2 and FLAIR hyperintensity  - Continue lamotrigine 75 mg BID  - Continue levetiracetam 1000 mg BID  - Ativan 2 mg IV for prolonged motor seizure lasting more than 3 minutes or a cluster of 3 or more motor seizures in an 8 hour period.  - cvEEG      #Hypersomnia vs Narcolepsy  - Continue home amphetamine-dextroamphetamine 10 mg BID  - Continue home modafinil 200 mg BID     #Depression  #Fibromyalgia  - Continue home bupropion 450mg  - Continue gabapentin 300 mg daily     #Asthma  - Continue home inhalers      F: PRN  E: PRN  N: Regular  A: PIV  GI: None  DVT: Enoxaparin     Code Status: Full Code  Surrogate Decision-Maker: Roommate (David Levi [phone])    Zaheer Cordova DO

## 2025-01-22 LAB
ALBUMIN SERPL BCP-MCNC: 3.8 G/DL (ref 3.4–5)
ANION GAP SERPL CALC-SCNC: 13 MMOL/L (ref 10–20)
BUN SERPL-MCNC: 15 MG/DL (ref 6–23)
CALCIUM SERPL-MCNC: 8.5 MG/DL (ref 8.6–10.6)
CHLORIDE SERPL-SCNC: 108 MMOL/L (ref 98–107)
CO2 SERPL-SCNC: 24 MMOL/L (ref 21–32)
CREAT SERPL-MCNC: 0.77 MG/DL (ref 0.5–1.3)
EGFRCR SERPLBLD CKD-EPI 2021: >90 ML/MIN/1.73M*2
ERYTHROCYTE [DISTWIDTH] IN BLOOD BY AUTOMATED COUNT: 15.9 % (ref 11.5–14.5)
GLUCOSE SERPL-MCNC: 90 MG/DL (ref 74–99)
HCT VFR BLD AUTO: 35.7 % (ref 36–52)
HGB BLD-MCNC: 11.1 G/DL (ref 12–17.5)
MAGNESIUM SERPL-MCNC: 2 MG/DL (ref 1.6–2.4)
MCH RBC QN AUTO: 23.4 PG (ref 26–34)
MCHC RBC AUTO-ENTMCNC: 31.1 G/DL (ref 32–36)
MCV RBC AUTO: 75 FL (ref 80–100)
NRBC BLD-RTO: 0 /100 WBCS (ref 0–0)
PHOSPHATE SERPL-MCNC: 4.6 MG/DL (ref 2.5–4.9)
PLATELET # BLD AUTO: 238 X10*3/UL (ref 150–450)
POTASSIUM SERPL-SCNC: 4 MMOL/L (ref 3.5–5.3)
RBC # BLD AUTO: 4.74 X10*6/UL (ref 4–5.9)
SODIUM SERPL-SCNC: 141 MMOL/L (ref 136–145)
WBC # BLD AUTO: 6.9 X10*3/UL (ref 4.4–11.3)

## 2025-01-22 PROCEDURE — 2500000002 HC RX 250 W HCPCS SELF ADMINISTERED DRUGS (ALT 637 FOR MEDICARE OP, ALT 636 FOR OP/ED)

## 2025-01-22 PROCEDURE — G0378 HOSPITAL OBSERVATION PER HR: HCPCS

## 2025-01-22 PROCEDURE — 2500000001 HC RX 250 WO HCPCS SELF ADMINISTERED DRUGS (ALT 637 FOR MEDICARE OP)

## 2025-01-22 PROCEDURE — 97116 GAIT TRAINING THERAPY: CPT | Mod: GP,CQ

## 2025-01-22 PROCEDURE — 97530 THERAPEUTIC ACTIVITIES: CPT | Mod: GP,CQ

## 2025-01-22 PROCEDURE — 83735 ASSAY OF MAGNESIUM: CPT

## 2025-01-22 PROCEDURE — 2500000004 HC RX 250 GENERAL PHARMACY W/ HCPCS (ALT 636 FOR OP/ED)

## 2025-01-22 PROCEDURE — 85027 COMPLETE CBC AUTOMATED: CPT

## 2025-01-22 PROCEDURE — 2500000005 HC RX 250 GENERAL PHARMACY W/O HCPCS

## 2025-01-22 PROCEDURE — 80069 RENAL FUNCTION PANEL: CPT

## 2025-01-22 PROCEDURE — 96372 THER/PROPH/DIAG INJ SC/IM: CPT

## 2025-01-22 PROCEDURE — 36415 COLL VENOUS BLD VENIPUNCTURE: CPT

## 2025-01-22 RX ORDER — DEXTROAMPHETAMINE SACCHARATE, AMPHETAMINE ASPARTATE, DEXTROAMPHETAMINE SULFATE AND AMPHETAMINE SULFATE 2.5; 2.5; 2.5; 2.5 MG/1; MG/1; MG/1; MG/1
10 TABLET ORAL
Status: DISCONTINUED | OUTPATIENT
Start: 2025-01-22 | End: 2025-01-23 | Stop reason: HOSPADM

## 2025-01-22 RX ORDER — OXYCODONE HYDROCHLORIDE 5 MG/1
2.5 TABLET ORAL ONCE
Status: DISCONTINUED | OUTPATIENT
Start: 2025-01-22 | End: 2025-01-23 | Stop reason: HOSPADM

## 2025-01-22 RX ADMIN — ACETAMINOPHEN 650 MG: 325 TABLET ORAL at 10:45

## 2025-01-22 RX ADMIN — SERTRALINE 50 MG: 50 TABLET, FILM COATED ORAL at 08:42

## 2025-01-22 RX ADMIN — METHOCARBAMOL 500 MG: 500 TABLET ORAL at 08:53

## 2025-01-22 RX ADMIN — ACETAMINOPHEN 650 MG: 325 TABLET ORAL at 05:41

## 2025-01-22 RX ADMIN — LIDOCAINE 4% 1 PATCH: 40 PATCH TOPICAL at 08:42

## 2025-01-22 RX ADMIN — GABAPENTIN 300 MG: 300 CAPSULE ORAL at 20:42

## 2025-01-22 RX ADMIN — LEVETIRACETAM 1000 MG: 500 TABLET, FILM COATED ORAL at 08:42

## 2025-01-22 RX ADMIN — ACETAMINOPHEN 650 MG: 325 TABLET ORAL at 23:04

## 2025-01-22 RX ADMIN — LEVETIRACETAM 1000 MG: 500 TABLET, FILM COATED ORAL at 20:42

## 2025-01-22 RX ADMIN — MODAFINIL 200 MG: 100 TABLET ORAL at 17:12

## 2025-01-22 RX ADMIN — BUPROPION 450 MG: 150 TABLET, EXTENDED RELEASE ORAL at 08:42

## 2025-01-22 RX ADMIN — FLUTICASONE FUROATE AND VILANTEROL TRIFENATATE 1 PUFF: 200; 25 POWDER RESPIRATORY (INHALATION) at 08:41

## 2025-01-22 RX ADMIN — DEXTROAMPHETAMINE SACCHARATE, AMPHETAMINE ASPARTATE, DEXTROAMPHETAMINE SULFATE AND AMPHETAMINE SULFATE 10 MG: 2.5; 2.5; 2.5; 2.5 TABLET ORAL at 08:42

## 2025-01-22 RX ADMIN — LAMOTRIGINE 75 MG: 25 TABLET ORAL at 08:42

## 2025-01-22 RX ADMIN — LAMOTRIGINE 75 MG: 25 TABLET ORAL at 20:42

## 2025-01-22 RX ADMIN — ENOXAPARIN SODIUM 40 MG: 100 INJECTION SUBCUTANEOUS at 08:42

## 2025-01-22 RX ADMIN — DEXTROAMPHETAMINE SACCHARATE, AMPHETAMINE ASPARTATE, DEXTROAMPHETAMINE SULFATE AND AMPHETAMINE SULFATE 10 MG: 2.5; 2.5; 2.5; 2.5 TABLET ORAL at 17:12

## 2025-01-22 RX ADMIN — IBUPROFEN 600 MG: 600 TABLET ORAL at 20:44

## 2025-01-22 RX ADMIN — DEXTROAMPHETAMINE SACCHARATE, AMPHETAMINE ASPARTATE, DEXTROAMPHETAMINE SULFATE AND AMPHETAMINE SULFATE 10 MG: 2.5; 2.5; 2.5; 2.5 TABLET ORAL at 11:26

## 2025-01-22 RX ADMIN — ACETAMINOPHEN 650 MG: 325 TABLET ORAL at 17:12

## 2025-01-22 RX ADMIN — MODAFINIL 200 MG: 100 TABLET ORAL at 08:42

## 2025-01-22 ASSESSMENT — COGNITIVE AND FUNCTIONAL STATUS - GENERAL
STANDING UP FROM CHAIR USING ARMS: A LITTLE
MOVING TO AND FROM BED TO CHAIR: A LITTLE
MOVING TO AND FROM BED TO CHAIR: A LITTLE
STANDING UP FROM CHAIR USING ARMS: A LITTLE
CLIMB 3 TO 5 STEPS WITH RAILING: A LITTLE
MOVING FROM LYING ON BACK TO SITTING ON SIDE OF FLAT BED WITH BEDRAILS: A LITTLE
MOBILITY SCORE: 23
MOBILITY SCORE: 20
DAILY ACTIVITIY SCORE: 24
WALKING IN HOSPITAL ROOM: A LITTLE
CLIMB 3 TO 5 STEPS WITH RAILING: A LITTLE
DAILY ACTIVITIY SCORE: 24
CLIMB 3 TO 5 STEPS WITH RAILING: A LITTLE
MOBILITY SCORE: 18
WALKING IN HOSPITAL ROOM: A LITTLE
TURNING FROM BACK TO SIDE WHILE IN FLAT BAD: A LITTLE

## 2025-01-22 ASSESSMENT — PAIN SCALES - GENERAL
PAINLEVEL_OUTOF10: 6
PAINLEVEL_OUTOF10: 8
PAINLEVEL_OUTOF10: 8
PAINLEVEL_OUTOF10: 0 - NO PAIN

## 2025-01-22 ASSESSMENT — PAIN - FUNCTIONAL ASSESSMENT
PAIN_FUNCTIONAL_ASSESSMENT: 0-10
PAIN_FUNCTIONAL_ASSESSMENT: 0-10

## 2025-01-22 ASSESSMENT — PAIN DESCRIPTION - ORIENTATION: ORIENTATION: RIGHT

## 2025-01-22 ASSESSMENT — PAIN DESCRIPTION - LOCATION: LOCATION: HIP

## 2025-01-22 ASSESSMENT — PAIN DESCRIPTION - DESCRIPTORS: DESCRIPTORS: ACHING

## 2025-01-22 NOTE — CARE PLAN
The patient's goals for the shift include      The clinical goals for the shift include Pt will tolerate VEEG monitoring to capture events for clinical correlation and medical management.

## 2025-01-22 NOTE — PROGRESS NOTES
Social Work Note:    DMITRYW spoke with the patient over the phone.  Patient reported that he would like to speak with someone in person due to possibly falling asleep on the phone.  Patient reported concern about hospital bill.  He reported that he was told he was in OBS status.  He explained that he agreed to be put into inpatient but not OBS,  DMITRYW explained that patients do not always agree to be put in OBS status and that there are specific qualifications patients need to meet in order to be considered inpatient status.  HOLLY explained that she believes patient would be billed as if he was getting treatment as an outpatient.  HOLLY explained that she would see if someone could see the patient in person to discuss.  HOLLY will continue to follow  SABINE Austin, HOLLY-S

## 2025-01-22 NOTE — PROGRESS NOTES
"Physical Therapy    Physical Therapy Treatment    Patient Name: Lisa Ramirez \"Alf"  MRN: [id number]  Department: [facility]  Room: [address] [place] [address]  Today's Date: 1/22/2025  Time Calculation  Start Time: 1050  Stop Time: 1114  Time Calculation (min): 24 min         Assessment/Plan   PT Assessment  End of Session Communication: Bedside nurse, Care Coordinator  Assessment Comment: .  End of Session Patient Position: Bed, 3 rail up, Alarm off, caregiver present     PT Plan  Treatment/Interventions: Bed mobility, Transfer training, Gait training, Stair training, Balance training, Strengthening, Endurance training, Range of motion, Therapeutic exercise, Therapeutic activity, Home exercise program  PT Plan: Ongoing PT  PT Frequency: 5 times per week  PT Discharge Recommendations: Low intensity level of continued care, Other (comment) (Outpatient PT)  Equipment Recommended upon Discharge:  (none)  PT Recommended Transfer Status: Stand by assist  PT - OK to Discharge: Yes      General Visit Information:   PT  Visit  PT Received On: 01/22/25  General  Prior to Session Communication: Bedside nurse, Care Coordinator  Patient Position Received: Bed, 3 rail up, Alarm off, not on at start of session  General Comment: Pt was pleasant, cooperative and willing to participate in therapy.  pt apprehensive of WBing however able to ambulate 30' using forearm crutches and maintaining TTWBing reamining apprehensive of full WBing    Subjective   Precautions:  Precautions  Medical Precautions: Fall precautions, Seizure precautions     Date/Time Vitals Session Patient Position Pulse Resp SpO2 BP MAP (mmHg)    01/22/25 14:15:30 --  --  88  20  99 %  108/71  83           Vital Signs Comment: vitals stable     Objective   Pain:  Pain Assessment  0-10 (Numeric) Pain Score: 0 - No pain  Cognition:  Cognition  Orientation Level: Oriented X4       Treatments:  Therapeutic Exercise  Therapeutic Exercise Performed: Yes  Therapeutic Exercise Activity " 1: instructed pt in supine AP, heel slides, and quad sets x15 reps AROM    Bed Mobility 1  Bed Mobility 1: Supine to sitting, Sitting to supine  Level of Assistance 1: Distant supervision  Bed Mobility Comments 1: cues    Ambulation/Gait Training 1  Surface 1: Level tile  Device 1: Forearm crutches  Assistance 1: Close supervision  Quality of Gait 1: Inconsistent stride length, Decreased step length, Antalgic, Soft knee(s)  Comments/Distance (ft) 1: 35', cues to promote increased WBing  Transfer 1  Technique 1: Sit to stand, Stand to sit  Transfer Device 1: Crutches  Transfer Level of Assistance 1: Close supervision         Outcome Measures:  [facility] Basic Mobility  Turning from your back to your side while in a flat bed without using bedrails: A little  Moving from lying on your back to sitting on the side of a flat bed without using bedrails: A little  Moving to and from bed to chair (including a wheelchair): A little  Standing up from a chair using your arms (e.g. wheelchair or bedside chair): A little  To walk in hospital room: A little  Climbing 3-5 steps with railing: A little  Basic Mobility - Total Score: 18    Education Documentation  Home Exercise Program, taught by Kayode Gonzalez PTA at 1/22/2025  3:20 PM.  Learner: Patient  Readiness: Acceptance  Method: Explanation  Response: Verbalizes Understanding    Education Comments  No comments found.        OP EDUCATION:       Encounter Problems       Encounter Problems (Active)       Balance       STG - Maintains independent dynamic standing balance with upper extremity support using [employer] crutches. (Progressing)       Start:  01/21/25    Expected End:  02/04/25            STG - Maintains independent static standing balance with upper extremity support using [employer] crutches. (Progressing)       Start:  01/21/25    Expected End:  02/04/25               Mobility       STG - Patient will ambulate 125ft, independently using [employer] crutches.  (Progressing)       Start:  01/21/25    Expected End:  02/04/25            STG - Patient will ascend and descend a flight of stairs, independently using [employer] crutches. (Progressing)       Start:  01/21/25    Expected End:  02/04/25               PT Transfers       STG - Transfer from bed to chair, independently using [employer] crutches. (Progressing)       Start:  01/21/25    Expected End:  02/04/25            STG - Patient to transfer to and from sit to supine, independently. (Progressing)       Start:  01/21/25    Expected End:  02/04/25            STG - Patient will transfer sit to and from stand, independently using [employer] crutches. (Progressing)       Start:  01/21/25    Expected End:  02/04/25

## 2025-01-22 NOTE — PROGRESS NOTES
"Lisa Ramirez \"Spenser\" is a 19 y.o. adult on day 1 of admission presenting with Seizure (Multi).      Subjective   Patient evaluated by PT on 1/21 PM. PT recommended low-intensity, outpatient rehab at discharge. Speaking with their physical therapist, patient was able to walk to door with crutches independently. Told therapist \"if I was sent home, I'd come back to the hospital because of the pain\". Per therapist, patient is able to walk with toe-touch weight-bearing. Effort was report as \"excellent\". Per patient's nurse, patient is able to move from bed to toilet independently with use of crutches.    When asked to report pain on a numerical scale, patient reports pain as \"high\". States acetaminophen has only mildly reduced pain by an estimated 1-2 points. Patient has refused oxycodone to manage hip pain. Patient has not tried ibuprofen PRN, and patient feels methocarbamol makes his leg \"crumple\".    On exam on 1/22 AM, patient states \"there's something wrong, even if you can't see it... it could be a soft tissue injury\". Patient states \"I will have to drop out of school if this admission isn't covered [by insurance]... so yeah I'd say it's pretty fucking serious\". Pain Management consult was discussed, which the patient dismissed as \"I don't have a pain disorder\". Patient expresses frustration that his hypersomnolence medications are not ordered as prescribed outpatient; however, Adderall XR is not on formulary and the equivalent order is active.    Patient is revisited on 1/22 PM. Patient continues to endorse pain which limits his ability to walk. Patient also continues to express concern regarding ability to pay and requests to speak with the utilization manager, patient advocate, and .    The results of the physical exam were discussed with the patient, which were not suggestive of fracture or dislocation and were not suggestive of decreased range of motion. The potential for soft tissue injury " "was acknowledged, and patient was informed that next step in management would be to complete a course of physical therapy, as recommended by the physical therapist. If the pain does not improve, this could be managed outpatient. Patient was informed that there was no indication for further imaging or Orthopedic Surgery consult at this time, and patient expressed understanding. As part of this discussion, the possibility of a functional neurologic disorder complicating the patient's current presentation was raised. Initially, patient dismissed examiner, stating \"I've seen so many doctors before, and they all say that\". The stigmata surrounding a diagnosis of a functional disorder was discussed, which was well received by the patient. Patient appreciated that, while it may not constitute the entirety of their experience, he acknowledged it could be a complicating factor. The possibility of a comorbid psychogenic non-epileptic spell disorder, in addition to suspected epilepsy, was also discussed. Patient expressed appreciation for learning about the co-incidence of these disorders.    Ultimately, patient preferred to remain inpatient and carried out conversations with interdisciplinary team members throughout the day regarding social and financial situation.       Objective   Last Recorded Vitals  Blood pressure 103/64, pulse 86, temperature 37.1 °C (98.8 °F), temperature source Temporal, resp. rate 20, height 1.676 m (5' 6\"), weight 85.7 kg (189 lb), SpO2 99%.    GEN: In no acute distress, sleeping  CV: Regular rate and rhythm; no murmurs, rubs, or gallops  RESP: Clear to auscultation bilaterally; no accessory muscle use; normal work of breathing  SKIN: Warm and dry; no visible rashes  MSK: Pain with deep palpation of anterior hips bilaterally. BLE full range of passive motion throughout hip, knee, and ankle. RLE active motion limited by     MENTAL STATE:  Oriented to person, place, and date. Recent and remote memory " intact. Normal attention span and concentration. Language testing was normal for comprehension, repetition, expression, and naming. General fund of knowledge intact.    CRANIAL NERVES:   CN 2:  Visual fields full to confrontation.  CN 3, 4, 6: Pupils round, 5 mm in diameter, equally reactive to light. No ptosis. EOMs normal alignment, full range with normal saccades, pursuit and convergence. No nystagmus.  CN 5:  Facial sensation intact bilaterally.  CN 7:  Normal and symmetric facial strength. Nasolabial folds symmetric.  CN 8:  Hearing intact to finger rub.  CN 9/10: Palate elevates symmetrically.  CN 11:  5/5 strength of bilateral shoulder shrug and neck turn.  CN 12:  Tongue midline, with normal bulk and strength; no fasciculations.     MOTOR:   Normal muscle bulk and tone in both upper and lower extremities.   No fasciculations, tremor or other abnormal movements were observed.       R L  Shoulder abduction 5 5  Elbow flexion  5 5  Elbow extension 5 5     5 5    Hip flexion  4 5  Knee flexion  4 5  Knee extension 4 5  Dorsiflexion  4 5  Plantarflexion  5 5    REFLEXES:      R L  BR   2 2  Biceps   2 2  Triceps   2 2  Knee   2 2  Ankle   2 2    Babinski: Toes downgoing to plantar stimulation. No clonus or other pathologic reflexes present.     SENSORY:   Intact to light touch in both upper and lower extremities.    COORDINATION:    Finger-to-nose intact without dysmetria or overshoot in bilateral upper extremities.  Heel-to-shin intact in LLE and limited by pain in RLE.    GAIT:   Observed standing with and without assistance from forearm crutches without unsteadiness or sway.    Relevant Results  Scheduled medications  acetaminophen, 650 mg, oral, q6h  amphetamine-dextroamphetamine, 10 mg, oral, BID after meals  buPROPion XL, 450 mg, oral, Daily  enoxaparin, 40 mg, subcutaneous, q24h  fluticasone furoate-vilanteroL, 1 puff, inhalation, Daily  gabapentin, 300 mg, oral, Nightly  lamoTRIgine, 75 mg, oral,  "BID   Followed by  [START ON 1/31/2025] lamoTRIgine, 100 mg, oral, BID  levETIRAcetam, 1,000 mg, oral, BID  lidocaine, 1 patch, transdermal, Daily  modafinil, 200 mg, oral, BID after meals  oxyCODONE, 2.5 mg, oral, Once  sertraline, 50 mg, oral, Daily    Continuous medications     PRN medications  PRN medications: albuterol, ibuprofen, ipratropium, methocarbamol    Results from last 72 hours   Lab Units 01/22/25  0618 01/21/25  1145 01/20/25  0251   WBC AUTO x10*3/uL 6.9 5.6 12.5*   HEMOGLOBIN g/dL 11.1* 11.1* 12.3   PLATELETS AUTO x10*3/uL 238 230 333   SODIUM mmol/L 141 139 138   POTASSIUM mmol/L 4.0 4.3 3.3*   CO2 mmol/L 24 24 23   BUN mg/dL 15 13 10   MAGNESIUM mg/dL 2.00 2.08  --    GLUCOSE mg/dL 90 84 77      CT hip right wo IV contrast (1/20/2025)  Normal CT of the right hip.    XR hand left 3+ views (1/20/2025)  Normal radiographs of the left hand. Thumb unremarkable.    XR hip right with pelvis when performed 4+ views (1/20/2025)  Unremarkable pelvis and right hip radiographs.    EEG  This vEEG is normal. No epileptiform discharges or lateralizing signs are seen. A full report will be scanned into the patient's chart at a later time. This report has been interpreted and electronically signed by    MR brain wo IV contrast (1/17/2025)  Slightly thickened left hippocampal body and tail with elevated T2 and FLAIR hyperintensity. These may reflect sequela of recent seizure activity. The other differential considerations such as sequela of acute ischemia, encephalitis/cerebritis are considered less likely.         Assessment/Plan      Assessment & Plan  Seizure (Multi)    Lisa Ramirez \"Spenser\" is a 19-year-old gender-queer patient (preferred pronouns \"he/they;\" assigned female sex at birth) with history notable for asthma, fibromyalgia, depression, idiopathic hypersomnia vs narcolepsy, and paroxysmal events who presented to the ED with possible breakthrough seizure episodes. Neurology was initially consulted " for the evaluation for breakthrough seizure episodes.      Previously the patient presented to [facility] ED on 1/16/2025 after left-sided clonic activity. During that admission, MRI brain w/o contrast showed no right hemisphere. Per Radiology report, slightly thickened left hippocampal body and tail with T2/FLAIR hyperintensity. EEG on 1/17/2025 showed no epileptiform activity.     This admission, patient presented with right-sided clonic activity not responsive to lamotrigine at increased dose of 100 mg BID and concurrent levetiracetam at 750 mg BID. Levetiracetam and lamotrigine values on admission 21 and 3.1, respectively, which suggests this presentation is less likely due to medication nonadherance. From the limited neurological examination on admission, patient did not have signs of lateral tongue biting. Mild leukocytosis (12.5) and normal venous lactate (2).      Patient initially declined admission for further monitoring however after remaining in the ED for >24 hours, he continued to have difficulty walking due to hip pain. Patient agreed with admission until overnight, patient again refused EMU monitoring, believing this admission to be for hip pain alone. Acetaminophen, ibuprofen, and methocarbamol were offered for analgesia. Patient refused opioids for acute pain management. PT recommended low-intensity, outpatient therapy at discharge. Patient continues to refuse discharge owing to hip pain. Further relevant context and discussion documented above.    4D Classification of the Paroxysmal Episodes  Unspecified paroxysmal events  Episodes semiology:   1) Olfactory aura -> Sensory aura -> LUE clonic (D) -> LLE clonic -> GTC   2) Facial twitching and left arm jerking movements   3) RLE jerking -> BLE jerking -> GTC  Onset/Frequency: 13 yo / inconsistent frequency, recently 1/16, 1/18, and 1/20  Localization: Unknown  Etiology: Unknown   Co-morbidities: Depression  ASM:  mg BID,  mg BID      Updates (1/22)  - PT consulted -> Recommended low-intensity rehab at discharge  - Updated inpatient amphetamine-dextroamphetamine orders to better reflect outpatient orders  - Medically ready for discharge     #Hip Pain, right  : : Reported hip pain with inability to stand or walk following paroxysmal event prior to admission  : : CT right hip w/o contrast (1/20) normal  - Pain regimen:              - Acetaminophen 650 mg Q6H for mild-to-moderate pain  - Ibuprofen 600 mg Q6H PRN for severe pain  - Methocarbamol 500 mg Q6H PRN for muscle spasms    #Breakthrough Paroxysmal Events  #Epilepsy vs PNES  : : Home lamotrigine 750 mg BID and levetiracetam 1 g BID  : : MRI brain seizure protocol (1/17) showed slightly thickened left hippocampal body and tail with elevated T2 and FLAIR hyperintensity  : : Patient refused EMU admission  - Continue lamotrigine 75 mg BID  - Continue levetiracetam 1000 mg BID  - Ativan 2 mg IV for prolonged motor seizure lasting more than 3 minutes or a cluster of 3 or more motor seizures in an 8 hour period     #Hypersomnia vs Narcolepsy  - Continue home amphetamine-dextroamphetamine 10 mg BID  - Continue home modafinil 200 mg BID     #Depression  #Possible Fibromyalgia  - Continue home bupropion 450mg  - Continue gabapentin 300 mg daily     #Asthma  - Continue home inhalers      F: PRN  E: PRN  N: Regular  A: PIV  GI: None  DVT: Enoxaparin     Code Status: Full Code  Surrogate Decision-Maker: Roommate (David Levi [phone])         This patient was seen, discussed and examined with the attending, Dr. Acuna, who agrees with the management plan.    Anthony Lopez MD  PGY-1, Neurology  Epilepsy: [id number]

## 2025-01-22 NOTE — CARE PLAN
The clinical goals for the shift include Pt will tolerate VEEG monitoring to capture events for clinical correlation and medical management.    No events this shift. R hip pain management is patient concern for shift.

## 2025-01-23 ENCOUNTER — PHARMACY VISIT (OUTPATIENT)
Dept: PHARMACY | Facility: CLINIC | Age: 20
End: 2025-01-23
Payer: COMMERCIAL

## 2025-01-23 VITALS
HEIGHT: 66 IN | RESPIRATION RATE: 20 BRPM | BODY MASS INDEX: 30.37 KG/M2 | HEART RATE: 76 BPM | TEMPERATURE: 97.9 F | DIASTOLIC BLOOD PRESSURE: 60 MMHG | OXYGEN SATURATION: 100 % | SYSTOLIC BLOOD PRESSURE: 109 MMHG | WEIGHT: 189 LBS

## 2025-01-23 PROCEDURE — 2500000001 HC RX 250 WO HCPCS SELF ADMINISTERED DRUGS (ALT 637 FOR MEDICARE OP)

## 2025-01-23 PROCEDURE — RXMED WILLOW AMBULATORY MEDICATION CHARGE

## 2025-01-23 PROCEDURE — G0378 HOSPITAL OBSERVATION PER HR: HCPCS

## 2025-01-23 PROCEDURE — 2500000002 HC RX 250 W HCPCS SELF ADMINISTERED DRUGS (ALT 637 FOR MEDICARE OP, ALT 636 FOR OP/ED)

## 2025-01-23 PROCEDURE — 2500000005 HC RX 250 GENERAL PHARMACY W/O HCPCS

## 2025-01-23 RX ADMIN — LIDOCAINE 4% 1 PATCH: 40 PATCH TOPICAL at 08:24

## 2025-01-23 RX ADMIN — ACETAMINOPHEN 650 MG: 325 TABLET ORAL at 11:46

## 2025-01-23 RX ADMIN — BUPROPION 450 MG: 150 TABLET, EXTENDED RELEASE ORAL at 08:25

## 2025-01-23 RX ADMIN — MODAFINIL 200 MG: 100 TABLET ORAL at 09:20

## 2025-01-23 RX ADMIN — LEVETIRACETAM 1000 MG: 500 TABLET, FILM COATED ORAL at 08:25

## 2025-01-23 RX ADMIN — DEXTROAMPHETAMINE SACCHARATE, AMPHETAMINE ASPARTATE, DEXTROAMPHETAMINE SULFATE AND AMPHETAMINE SULFATE 10 MG: 2.5; 2.5; 2.5; 2.5 TABLET ORAL at 05:18

## 2025-01-23 RX ADMIN — SERTRALINE 50 MG: 50 TABLET, FILM COATED ORAL at 08:25

## 2025-01-23 RX ADMIN — ACETAMINOPHEN 650 MG: 325 TABLET ORAL at 05:18

## 2025-01-23 RX ADMIN — DEXTROAMPHETAMINE SACCHARATE, AMPHETAMINE ASPARTATE, DEXTROAMPHETAMINE SULFATE AND AMPHETAMINE SULFATE 10 MG: 2.5; 2.5; 2.5; 2.5 TABLET ORAL at 09:20

## 2025-01-23 RX ADMIN — LAMOTRIGINE 75 MG: 25 TABLET ORAL at 08:29

## 2025-01-23 RX ADMIN — IBUPROFEN 600 MG: 600 TABLET ORAL at 09:20

## 2025-01-23 RX ADMIN — DEXTROAMPHETAMINE SACCHARATE, AMPHETAMINE ASPARTATE, DEXTROAMPHETAMINE SULFATE AND AMPHETAMINE SULFATE 10 MG: 2.5; 2.5; 2.5; 2.5 TABLET ORAL at 11:46

## 2025-01-23 ASSESSMENT — PAIN SCALES - GENERAL: PAINLEVEL_OUTOF10: 8

## 2025-01-23 ASSESSMENT — PAIN DESCRIPTION - ORIENTATION: ORIENTATION: RIGHT

## 2025-01-23 ASSESSMENT — PAIN DESCRIPTION - LOCATION: LOCATION: HIP

## 2025-01-23 NOTE — CARE PLAN
The patient's goals for the shift include      The clinical goals for the shift include Pt will have increased mobility during shift    Over the shift, the patient didmeet goal. PT came by and saw patient before she left.

## 2025-01-23 NOTE — NURSING NOTE
Discharge instructions,home meds and follow up apts. Reviewed with patient. No questions/concerns. Having a friend take her back to dorm at [facility].

## 2025-01-23 NOTE — DISCHARGE SUMMARY
Discharge Diagnosis  Seizure (Multi)    Epilepsy Quality and Core Measure Topics  The patient was encouraged to keep a seizure diary  The patient was encouraged to practice good sleep hygiene  The risks and benefits of pertinent medications were discussed with the patient and/or family  Addressed all relevant psychiatric comorbidities  First Time Seizures  No this is not the patient's first seizure  Is this patient seizure free?  No, The risks and benefits of additional treatment were discussed with the patient and/or family, and no changes will be made at this time.   Should this patient observe standard seizure precautions?  Yes Reviewed seizure precautions with patient; specifically, the patient may not drive, may not operate heavy machinery, ought not swim unsupervised, should shower rather than bath, should be cautious with hot or heavy objects, and should not perform any activities at heights such as on a ladder. This will be re-assessed at the patient's next appointment.   Medication Side Effects Discussion Statement  The risks and benefits of pertinent medications were discussed with the patient and/or kin.  Women with Epilepsy Discussion  Discussion for a female patient with epilepsy of childbearing age consisted of: The risks and benefits of oral contraceptive pills and/or hormone replacement therapy as it pertains to her epilepsy and the treatment thereof., To please inform us if you plan to get pregnant so that we can review your antiepileptic options and monitor you more frequently., and To please inform us if contraception is changed or discontinued.    Issues Requiring Follow-Up  Epilepsy follow-up with Alisa Houston -> Originally scheduled for 1/24, but attempting to reschedule ~2 weeks post discharge  Lamotrigine titration schedule: 75 mg BID for two weeks (1/18 to 1/31), then 100 mg BID until follow-up  Levetiracetam 1000 mg BID  Patient requests EMU evaluation be scheduled for May 2025  Monitor  "hip pain and response to physical therapy and current pain medication regimen    Test Results Pending At Discharge  Pending Labs       No current pending labs.          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 Neurology was initially consulted for the evaluation for breakthrough seizure episodes.      Previously the patient presented to [facility] ED on 1/16/2025 after left-sided clonic activity. During that admission, MRI brain w/o contrast showed no right hemisphere. Per Radiology report, slightly thickened left hippocampal body and tail with T2/FLAIR hyperintensity. EEG on 1/17/2025 showed no epileptiform activity.     This admission, patient presented with right-sided clonic activity not responsive to lamotrigine at increased dose of 100 mg BID and concurrent levetiracetam at 750 mg BID. Levetiracetam and lamotrigine values on admission 21 and 3.1, respectively, which suggests this presentation is less likely due to medication nonadherance. From the limited neurological examination on admission, patient did not have signs of lateral tongue biting. Mild leukocytosis (12.5) and normal venous lactate (2).      Patient initially declined admission for further monitoring however after remaining in the ED for >24 hours, he continued to have difficulty walking due to hip pain. Patient agreed with admission until overnight, patient again refused EMU monitoring, believing this admission to be for hip pain alone. Acetaminophen, ibuprofen, and methocarbamol were offered for analgesia. Patient refused opioids for acute pain management. PT recommended low-intensity, outpatient therapy at discharge. Patient continues to refuse discharge owing to " hip pain. Extensive conversations with patient were held, including the lack of indication for further work-up of hip pain. Please reference Neurology progress note from 1/22 and Social Work note from 1/23 for further documentation. Patient discharged to home in good condition.    Issues Requiring Follow-up:  Epilepsy follow-up with Alisa Celso -> Originally scheduled for 1/24, but attempting to reschedule ~2 weeks post discharge  Lamotrigine titration schedule: 75 mg BID for two weeks (1/18 to 1/31), then 100 mg BID until follow-up  Levetiracetam 1000 mg BID  Patient requests EMU evaluation be scheduled for May 2025  Monitor hip pain and response to physical therapy and current pain medication regimen    Pertinent Physical Exam At Time of Discharge  GEN: In no acute distress, sleeping  CV: Regular rate and rhythm; no murmurs, rubs, or gallops  RESP: Clear to auscultation bilaterally; no accessory muscle use; normal work of breathing  SKIN: Warm and dry; no visible rashes  MSK: Pain with deep palpation of anterior hips bilaterally. BLE full range of passive motion throughout hip, knee, and ankle.    MENTAL STATE:  Oriented to person, place, and date. Recent and remote memory intact. Normal attention span and concentration. Language testing was normal for comprehension, repetition, expression, and naming. General fund of knowledge intact.     CRANIAL NERVES:   CN 2:  Visual fields full to confrontation.  CN 3, 4, 6: Pupils round, 5 mm in diameter, equally reactive to light. No ptosis. EOMs normal alignment, full range with normal saccades, pursuit and convergence. No nystagmus.  CN 5:  Facial sensation intact bilaterally.  CN 7:  Normal and symmetric facial strength. Nasolabial folds symmetric.  CN 8:  Hearing intact to finger rub.  CN 9/10: Palate elevates symmetrically.  CN 11:  5/5 strength of bilateral shoulder shrug and neck turn.  CN 12:  Tongue midline, with normal bulk and strength; no fasciculations.       MOTOR:   Normal muscle bulk and tone in both upper and lower extremities.   No fasciculations, tremor or other abnormal movements were observed.        R L  Shoulder abduction 5 5  Elbow flexion  5 5  Elbow extension 5 5     5 5     Hip flexion  4* 5  Knee flexion  4* 5  Knee extension 4* 5  Dorsiflexion  4* 5  Plantarflexion  5 5  *Limited by pain     REFLEXES:      R L  BR   2 2  Biceps   2 2  Triceps   2 2  Knee   2 2  Ankle   2 2    SENSORY:   Intact to light touch in both upper and lower extremities.     Home Medications     Medication List      START taking these medications     lidocaine 4 % patch; Place 1 patch over 12 hours on the skin once daily.   Remove & discard patch within 12 hours or as directed by MD.     CHANGE how you take these medications     amphetamine-dextroamphetamine 10 mg tablet; Commonly known as: AdderalL;   Take 1 tablet (10 mg) by mouth 2 times a day.; What changed: Another   medication with the same name was removed. Continue taking this   medication, and follow the directions you see here.   fluticasone propion-salmeteroL 250-50 mcg/dose diskus inhaler; Commonly   known as: Advair Diskus; Inhale 1 puff 2 times a day. Rinse mouth with   water after use to reduce aftertaste and incidence of candidiasis. Do not   swallow.; What changed: when to take this, reasons to take this   levETIRAcetam 1,000 mg tablet; Commonly known as: Keppra; Take 1 tablet   (1,000 mg) by mouth 2 times a day.; What changed: medication strength, how   much to take     CONTINUE taking these medications     acetaminophen 500 mg tablet; Commonly known as: Tylenol   * albuterol 90 mcg/actuation inhaler; Inhale 2 puffs every 4 hours if   needed.   * albuterol 2.5 mg/0.5 mL nebulizer solution; USE ONE DOSE EVERY 4 HOURS   AS NEEDED FOR SHORTNESS OF BREATH   budesonide-formoteroL 160-4.5 mcg/actuation inhaler; Commonly known as:   Symbicort   buPROPion  mg 24 hr tablet; Commonly known as: Wellbutrin XL;  Take   3 tablets (450 mg) by mouth once daily.   ferrous sulfate (325 mg ferrous sulfate) tablet; Take 1 tablet (325 mg)   by mouth once daily.   gabapentin 300 mg capsule; Commonly known as: Neurontin; Take 1 capsule   (300 mg) by mouth once daily at bedtime.   humidifiers misc; 1 Units once daily as needed (for shortness of breath,   wheezing).   ibuprofen 200 mg tablet   ipratropium 17 mcg/actuation inhaler; Commonly known as: Atrovent   * lamoTRIgine 25 mg tablet; Commonly known as: LaMICtal; Take 3 tablets   (75 mg) by mouth 2 times a day for 14 days, THEN 4 tablets (100 mg) 2   times a day for 14 days.; Start taking on: January 17, 2025   * lamoTRIgine 100 mg tablet; Commonly known as: LaMICtal; Take 1 tablet   (100 mg) by mouth 2 times a day. Do not fill before February 14, 2025.;   Start taking on: February 14, 2025   modafinil 200 mg tablet; Commonly known as: Provigil; Take 1 tablet (200   mg) by mouth 2 times a day.   sertraline 50 mg tablet; Commonly known as: Zoloft  * This list has 4 medication(s) that are the same as other medications   prescribed for you. Read the directions carefully, and ask your doctor or   other care provider to review them with you.     ASK your doctor about these medications     nasal spray Nayzilam 5 mg/spray (0.1 mL) spray,non-aerosol; Generic   drug: midazolam; Administer 1 spray into affected nostril(s) 1 time for 1   dose.; Ask about: Should I take this medication?     Outpatient Follow-Up  Future Appointments   Date Time Provider Department Center   1/24/2025  1:00 PM GEORGE Haney-CNP [id number] Academic   2/3/2025  3:40 PM Omayra Buitrago MD MPH [id number] Academic   2/13/2025  1:30 PM Ezekiel Aguilera MD [id number] Academic   2/25/2025  9:00 AM Omayra Buitrago MD MPH [id number] Academic   4/23/2025  3:00 PM Maurice Aleman MD [id number] Academic     Anthony Lopez MD

## 2025-01-23 NOTE — CARE PLAN
The clinical goals for the shift include Pt will have increased mobility during shift    During the shift pt remained sz free. Pt was able to ambulate 5 X's around the [facility] hallway with her crutches with a steady gait.

## 2025-01-23 NOTE — HOSPITAL COURSE
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 Neurology was initially consulted for the evaluation for breakthrough seizure episodes.      Previously the patient presented to [facility] ED on 1/16/2025 after left-sided clonic activity. During that admission, MRI brain w/o contrast showed no right hemisphere. Per Radiology report, slightly thickened left hippocampal body and tail with T2/FLAIR hyperintensity. EEG on 1/17/2025 showed no epileptiform activity.     This admission, patient presented with right-sided clonic activity not responsive to lamotrigine at increased dose of 100 mg BID and concurrent levetiracetam at 750 mg BID. Levetiracetam and lamotrigine values on admission 21 and 3.1, respectively, which suggests this presentation is less likely due to medication nonadherance. From the limited neurological examination on admission, patient did not have signs of lateral tongue biting. Mild leukocytosis (12.5) and normal venous lactate (2).      Patient initially declined admission for further monitoring however after remaining in the ED for >24 hours, he continued to have difficulty walking due to hip pain. Patient agreed with admission until overnight, patient again refused EMU monitoring, believing this admission to be for hip pain alone. Acetaminophen, ibuprofen, and methocarbamol were offered for analgesia. Patient refused opioids for acute pain management. PT recommended low-intensity, outpatient therapy at discharge. Patient continues to refuse discharge owing to hip pain. Extensive conversations with patient were held, including the lack of indication for further work-up of hip pain. Please reference Neurology progress note from 1/22 and Social " Work note from 1/23 for further documentation. Patient discharged to home in good condition.    Issues Requiring Follow-up:  Epilepsy follow-up with Alisa Houston -> Originally scheduled for 1/24, but attempting to reschedule ~2 weeks post discharge  Lamotrigine titration schedule: 75 mg BID for two weeks (1/18 to 1/31), then 100 mg BID until follow-up  Levetiracetam 1000 mg BID  Patient requests EMU evaluation be scheduled for May 2025  Monitor hip pain and response to physical therapy and current pain medication regimen

## 2025-01-23 NOTE — PROGRESS NOTES
Transitional Care Coordinator Progress Note:   Dr. Lopez, BERENICE Bay, Care Transitions Supervisor Aracelis, bedside RN Ara and I met with pt this morning to discuss discharge planning and address any of her concerns.  Dr. Lopez reviewed all of the testing ordered and results with pt.  He also explained that an ortho consult is not warranted at this time.  PT is recommending outpatient PT. Pt has been ambulating in the teran with therapy and up to bathroom without issue.  Pt already has forearm crutches at the bedside.  Pt states she is not able to bear weight on her leg.  Pt was offered home care for PT, but she declined stating she did not want her RA to have to let anyone into the building.  Pt had questions regarding being told she would be inpatient in the ED.  Again, attempted to explain the  process of review. Pt requested to speak with someone from the  dept,  Tiffany was notified.  Pt also requested to speak with a patient advocate. Pt states she left a message yesterday and was informed they have 48 hrs to return her call.   BERENICE Bay also called the patient advocate's office and left a message.  Pt was also offered transport home, but pt states she does not need it.  Dr. Lopez will place discharge order for this afternoon and complete prescription for outpatient PT.  Pt is medically ready for discharge home today.  There were no other discharge needs identified at this time.    Gisela DC, RN-BC  Transitional Care Coordinator (TCC)  [phone]

## 2025-01-24 ENCOUNTER — APPOINTMENT (OUTPATIENT)
Dept: NEUROLOGY | Facility: HOSPITAL | Age: 20
End: 2025-01-24
Payer: COMMERCIAL

## 2025-01-29 DIAGNOSIS — G47.411 NARCOLEPSY WITH CATAPLEXY (HHS-HCC): ICD-10-CM

## 2025-01-29 NOTE — TELEPHONE ENCOUNTER
Pt requesting medication refill (increasing dose to adderall 3x day 10mg tab)    This RN checked [facility] and it is consist with prescribed medications. Patient has been filling Rx appropriately. Prescription request sent to provider to review.      Next appt with sleep med provider on 2/13/25

## 2025-01-30 RX ORDER — DEXTROAMPHETAMINE SACCHARATE, AMPHETAMINE ASPARTATE, DEXTROAMPHETAMINE SULFATE AND AMPHETAMINE SULFATE 2.5; 2.5; 2.5; 2.5 MG/1; MG/1; MG/1; MG/1
10 TABLET ORAL 3 TIMES DAILY
Qty: 90 TABLET | Refills: 0 | Status: SHIPPED | OUTPATIENT
Start: 2025-01-30 | End: 2025-03-02

## 2025-01-31 ENCOUNTER — APPOINTMENT (OUTPATIENT)
Dept: NEUROLOGY | Facility: CLINIC | Age: 20
End: 2025-01-31
Payer: COMMERCIAL

## 2025-01-31 PROCEDURE — RXMED WILLOW AMBULATORY MEDICATION CHARGE

## 2025-02-03 ENCOUNTER — APPOINTMENT (OUTPATIENT)
Dept: PRIMARY CARE | Facility: CLINIC | Age: 20
End: 2025-02-03
Payer: COMMERCIAL

## 2025-02-03 DIAGNOSIS — R56.9 SEIZURE (MULTI): Primary | ICD-10-CM

## 2025-02-03 PROCEDURE — 99213 OFFICE O/P EST LOW 20 MIN: CPT | Performed by: INTERNAL MEDICINE

## 2025-02-03 PROCEDURE — RXMED WILLOW AMBULATORY MEDICATION CHARGE

## 2025-02-03 ASSESSMENT — ENCOUNTER SYMPTOMS
HEADACHES: 1
SLURRED SPEECH: 0
CLUMSINESS: 1
FATIGUE: 1
VOMITING: 1
BOWEL INCONTINENCE: 1
NAUSEA: 1
PALPITATIONS: 0
NECK PAIN: 1
NEUROLOGIC COMPLAINT: 1
CONFUSION: 1
ALTERED MENTAL STATUS: 1
NEAR-SYNCOPE: 0
FOCAL SENSORY LOSS: 1
FEVER: 0
BACK PAIN: 0
SHORTNESS OF BREATH: 0
ABDOMINAL PAIN: 0
DIAPHORESIS: 1
MEMORY LOSS: 1
VISUAL CHANGE: 0
DIZZINESS: 1
FOCAL WEAKNESS: 0
VERTIGO: 1
LOSS OF BALANCE: 1
AURA: 1
WEAKNESS: 0
LIGHT-HEADEDNESS: 0

## 2025-02-03 NOTE — PROGRESS NOTES
INTERNAL MEDICINE - PRIMARY CARE  Established patient visit - post ED follow-up - video      Lisa Ramirez is 19 y.o. a who presents for No chief complaint on file.     Problem list   Asthma    Seizures with recent activity  Left foot injury   Unintentional weight loss  Chronic pain     Subjective   HPI   The patient presents with the following concerns:    Neurological:  Patient reports two recent hospitalizations due to seizures. A breakthrough seizure occurred after discharge, but none since. Emergency services were called due to prolonged seizure duration. Versed was prescribed as an emergency medication for seizures lasting 3 minutes or longer, with one dose administered during the most recent seizure. They experience increased fatigue, cognitive slowing, and difficulty with speech fluency since the adjustment of antiepileptic medications.    Medications:  Lamotrigine was increased after the first discharge on the 18th. Keppra dosage was increased from 750 mg BID to 1000 mg BID.      Musculoskeletal:  Patient reports hip discomfort following the seizures, which worsened after the second episode. X-ray showed no dislocation or subluxation, but the patient experiences instability when bearing weight.    Respiratory:  Patient reports ongoing asthma symptoms. A recent pulmonary function test was challenging to complete. The patient wears a chest binder and expresses concern about its potential impact on breathing. The patient has been using the binder since 7th grade and maintains strict posture through upper body exercises. Patient uses unspecified inhalers for asthma.    Social and Functional Status:  Patient is currently studying and preparing for [employer]. Unable to drive, relying on others for transportation. Performs physical therapy and upper body exercises. Experiencing stress due to medical issues affecting studies and social interactions. Has friends who assist during medical emergencies.    Review of  Systems:  - Neurological: Experiences seizures, exhaustion, difficulty thinking quickly, trouble speaking fluently  - Musculoskeletal: Hip feels unstable when putting weight on it  - Respiratory: Breathing difficulties, struggles with pulmonary function test              No Known Allergies     Objective   Physical Exam     Visit Vitals  OB Status Having periods   Smoking Status Never      Appears well, no distress  Breathing comfortably at rest   Cranial nerves grossly intact      Medications     Current Outpatient Medications   Medication Instructions    acetaminophen (TYLENOL) 1,000 mg, Every 6 hours PRN    albuterol 2.5 mg/0.5 mL nebulizer solution USE ONE DOSE EVERY 4 HOURS AS NEEDED FOR SHORTNESS OF BREATH    albuterol 90 mcg/actuation inhaler 2 puffs, inhalation, Every 4 hours PRN    amphetamine-dextroamphetamine (AdderalL) 10 mg tablet 10 mg, oral, 3 times daily, Okay to fill early- increasing dose from twice daily to three times daily    budesonide-formoteroL (Symbicort) 160-4.5 mcg/actuation inhaler 2 times daily    buPROPion XL (WELLBUTRIN XL) 450 mg, oral, Daily    ferrous sulfate (325 mg ferrous sulfate) 325 mg, oral, Daily RT    fluticasone propion-salmeteroL (Advair Diskus) 250-50 mcg/dose diskus inhaler 1 puff, inhalation, 2 times daily RT, Rinse mouth with water after use to reduce aftertaste and incidence of candidiasis. Do not swallow.    gabapentin (NEURONTIN) 300 mg, oral, Nightly    humidifiers misc 1 Units, miscellaneous, Daily PRN    ibuprofen 800 mg, Every 8 hours PRN    ipratropium (Atrovent) 17 mcg/actuation inhaler 1 puff, Daily PRN    lamoTRIgine (LaMICtal) 25 mg tablet Take 3 tablets (75 mg) by mouth 2 times a day for 14 days, THEN 4 tablets (100 mg) 2 times a day for 14 days.    [START ON 2/14/2025] lamoTRIgine (LAMICTAL) 100 mg, oral, 2 times daily    levETIRAcetam (KEPPRA) 1,000 mg, oral, 2 times daily    lidocaine 4 % patch 1 patch, transdermal, Daily, Remove & discard patch within  12 hours or as directed by MD.    modafinil (PROVIGIL) 200 mg, oral, 2 times daily    nasal spray midazolam (Versed) 5 mg/spray (0.1 mL) spray,non-aerosol Administer 1 spray as needed for seizures > 3 minutes    sertraline (ZOLOFT) 50 mg, Daily      Assessment/Plan   Assessment/Plan    Epilepsy with Breakthrough Seizures:  - History of epilepsy with recent breakthrough seizures requiring two hospitalizations. Currently on increased doses of lamotrigine and Keppra (levetiracetam).  - Patient reports side effects including fatigue, cognitive slowing, and speech difficulties.  - Versed (midazolam) prescribed for emergency use in prolonged seizures.  - Plan: Refill Versed (midazolam) nasal spray. Continue current antiepileptic medications: lamotrigine and Keppra (levetiracetam) 1000 mg BID. Follow up with epileptologist on the 14th of this month. Consider Epilepsy Monitoring Unit (EMU) stay after finals. Advise patient to contact psychiatrist regarding potential stimulant medication adjustments.    Hip Pain Post-Seizure:  - Hip pain and instability reported following recent seizures. X-ray shows no dislocation or subluxation.  - Inpatient physical therapy recommended outpatient follow-up.  - Plan: Follow up with outpatient physical therapy for hip pain and instability. Patient to schedule appointment using existing referral.    Asthma:  - History of asthma with recent pulmonary function testing. Reports difficulty affording prescribed inhalers.  - Currently using inhalers obtained during inpatient stay.  - Patient wears a chest binder, which may impact respiratory function.  - Plan: Follow up with pulmonologist in April. Explore cost-effective options for inhaler prescriptions, including [facility] pharmacy. Consider referral to pharmacist for assistance with medication cost reduction programs.          Omayra Buitrago MD MPH  I am the attending physician.     Answers submitted by the patient for this  visit:  Neurological Problem Questionnaire (Submitted on 2/3/2025)  Chief Complaint: Neurologic complaint  clumsiness: Yes  altered mental status: Yes  syncope: No  loss of balance: Yes  focal sensory loss: Yes  memory loss: Yes  near-syncope: No  slurred speech: No  visual change: No  weakness: No  focal weakness: No  Chronicity: chronic  Onset: more than 1 year ago  Onset quality: suddenly  Progression since onset: rapidly worsening  Focality: facial, left-sided, right-sided, lower extremity, upper extremity  abdominal pain: No  aura: Yes  back pain: No  bladder incontinence: Yes  bowel incontinence: Yes  chest pain: No  confusion: Yes  dizziness: Yes  fatigue: Yes  vertigo: Yes  fever: No  headaches: Yes  auditory change: No  light-headedness: No  nausea: Yes  neck pain: Yes  palpitations: No  shortness of breath: No  diaphoresis: Yes  vomiting: Yes  Treatments tried: acetaminophen, aspirin, bed rest, drinking, eating, medication, sleep  Improvement on treatment: moderate

## 2025-02-04 ENCOUNTER — PHARMACY VISIT (OUTPATIENT)
Dept: PHARMACY | Facility: CLINIC | Age: 20
End: 2025-02-04
Payer: COMMERCIAL

## 2025-02-07 PROCEDURE — RXMED WILLOW AMBULATORY MEDICATION CHARGE

## 2025-02-10 ENCOUNTER — PHARMACY VISIT (OUTPATIENT)
Dept: PHARMACY | Facility: CLINIC | Age: 20
End: 2025-02-10
Payer: COMMERCIAL

## 2025-02-14 ENCOUNTER — APPOINTMENT (OUTPATIENT)
Dept: NEUROLOGY | Facility: HOSPITAL | Age: 20
End: 2025-02-14
Payer: COMMERCIAL

## 2025-02-24 ENCOUNTER — PATIENT MESSAGE (OUTPATIENT)
Dept: SLEEP MEDICINE | Facility: HOSPITAL | Age: 20
End: 2025-02-24
Payer: COMMERCIAL

## 2025-02-24 DIAGNOSIS — G47.10 HYPERSOMNIA: ICD-10-CM

## 2025-02-24 DIAGNOSIS — G47.411 NARCOLEPSY WITH CATAPLEXY (HHS-HCC): ICD-10-CM

## 2025-02-25 ENCOUNTER — APPOINTMENT (OUTPATIENT)
Dept: PRIMARY CARE | Facility: CLINIC | Age: 20
End: 2025-02-25
Payer: COMMERCIAL

## 2025-02-26 PROCEDURE — RXMED WILLOW AMBULATORY MEDICATION CHARGE

## 2025-02-26 RX ORDER — DEXTROAMPHETAMINE SACCHARATE, AMPHETAMINE ASPARTATE, DEXTROAMPHETAMINE SULFATE AND AMPHETAMINE SULFATE 2.5; 2.5; 2.5; 2.5 MG/1; MG/1; MG/1; MG/1
10 TABLET ORAL 3 TIMES DAILY
Qty: 90 TABLET | Refills: 0 | Status: SHIPPED | OUTPATIENT
Start: 2025-02-26 | End: 2025-03-30

## 2025-02-26 RX ORDER — MODAFINIL 200 MG/1
200 TABLET ORAL 2 TIMES DAILY
Qty: 60 TABLET | Refills: 0 | Status: SHIPPED | OUTPATIENT
Start: 2025-02-26 | End: 2025-03-29

## 2025-02-26 RX ORDER — DEXTROAMPHETAMINE SACCHARATE, AMPHETAMINE ASPARTATE MONOHYDRATE, DEXTROAMPHETAMINE SULFATE AND AMPHETAMINE SULFATE 5; 5; 5; 5 MG/1; MG/1; MG/1; MG/1
20 CAPSULE, EXTENDED RELEASE ORAL EVERY MORNING
Qty: 30 CAPSULE | Refills: 0 | Status: SHIPPED | OUTPATIENT
Start: 2025-02-26 | End: 2025-03-29

## 2025-02-27 ENCOUNTER — PHARMACY VISIT (OUTPATIENT)
Dept: PHARMACY | Facility: CLINIC | Age: 20
End: 2025-02-27
Payer: COMMERCIAL

## 2025-02-27 PROCEDURE — RXMED WILLOW AMBULATORY MEDICATION CHARGE

## 2025-02-28 PROCEDURE — RXMED WILLOW AMBULATORY MEDICATION CHARGE

## 2025-03-03 ENCOUNTER — PHARMACY VISIT (OUTPATIENT)
Dept: PHARMACY | Facility: CLINIC | Age: 20
End: 2025-03-03
Payer: COMMERCIAL

## 2025-03-16 PROCEDURE — RXMED WILLOW AMBULATORY MEDICATION CHARGE

## 2025-03-17 ENCOUNTER — PHARMACY VISIT (OUTPATIENT)
Dept: PHARMACY | Facility: CLINIC | Age: 20
End: 2025-03-17
Payer: COMMERCIAL

## 2025-03-17 DIAGNOSIS — R56.9 SEIZURE (MULTI): ICD-10-CM

## 2025-03-17 PROCEDURE — RXMED WILLOW AMBULATORY MEDICATION CHARGE

## 2025-03-17 RX ORDER — LEVETIRACETAM 1000 MG/1
1000 TABLET ORAL 2 TIMES DAILY
Qty: 60 TABLET | Refills: 3 | Status: SHIPPED | OUTPATIENT
Start: 2025-03-17 | End: 2026-03-17

## 2025-03-27 DIAGNOSIS — G47.411 NARCOLEPSY WITH CATAPLEXY (HHS-HCC): ICD-10-CM

## 2025-03-27 DIAGNOSIS — G47.10 HYPERSOMNIA: ICD-10-CM

## 2025-03-28 PROCEDURE — RXMED WILLOW AMBULATORY MEDICATION CHARGE

## 2025-03-29 PROCEDURE — RXMED WILLOW AMBULATORY MEDICATION CHARGE

## 2025-03-29 RX ORDER — DEXTROAMPHETAMINE SACCHARATE, AMPHETAMINE ASPARTATE MONOHYDRATE, DEXTROAMPHETAMINE SULFATE AND AMPHETAMINE SULFATE 5; 5; 5; 5 MG/1; MG/1; MG/1; MG/1
20 CAPSULE, EXTENDED RELEASE ORAL EVERY MORNING
Qty: 30 CAPSULE | Refills: 0 | Status: SHIPPED | OUTPATIENT
Start: 2025-03-29 | End: 2025-04-28

## 2025-03-29 RX ORDER — MODAFINIL 200 MG/1
200 TABLET ORAL 2 TIMES DAILY
Qty: 60 TABLET | Refills: 0 | Status: SHIPPED | OUTPATIENT
Start: 2025-03-29 | End: 2025-04-28

## 2025-03-30 DIAGNOSIS — G47.411 NARCOLEPSY WITH CATAPLEXY (HHS-HCC): ICD-10-CM

## 2025-03-31 ENCOUNTER — PHARMACY VISIT (OUTPATIENT)
Dept: PHARMACY | Facility: CLINIC | Age: 20
End: 2025-03-31
Payer: COMMERCIAL

## 2025-04-01 RX ORDER — DEXTROAMPHETAMINE SACCHARATE, AMPHETAMINE ASPARTATE, DEXTROAMPHETAMINE SULFATE AND AMPHETAMINE SULFATE 2.5; 2.5; 2.5; 2.5 MG/1; MG/1; MG/1; MG/1
10 TABLET ORAL 3 TIMES DAILY
Qty: 90 TABLET | Refills: 0 | Status: SHIPPED | OUTPATIENT
Start: 2025-04-01 | End: 2025-04-03 | Stop reason: ALTCHOICE

## 2025-04-03 ENCOUNTER — OFFICE VISIT (OUTPATIENT)
Dept: SLEEP MEDICINE | Facility: HOSPITAL | Age: 20
End: 2025-04-03
Payer: COMMERCIAL

## 2025-04-03 VITALS — BODY MASS INDEX: 28.89 KG/M2 | TEMPERATURE: 97.6 F | WEIGHT: 179 LBS

## 2025-04-03 DIAGNOSIS — G47.11 IDIOPATHIC HYPERSOMNIA: ICD-10-CM

## 2025-04-03 DIAGNOSIS — G47.411 NARCOLEPSY WITH CATAPLEXY (HHS-HCC): Primary | ICD-10-CM

## 2025-04-03 DIAGNOSIS — G47.10 HYPERSOMNIA: ICD-10-CM

## 2025-04-03 DIAGNOSIS — R56.9 SEIZURE (MULTI): ICD-10-CM

## 2025-04-03 PROCEDURE — 99214 OFFICE O/P EST MOD 30 MIN: CPT | Performed by: STUDENT IN AN ORGANIZED HEALTH CARE EDUCATION/TRAINING PROGRAM

## 2025-04-03 PROCEDURE — RXMED WILLOW AMBULATORY MEDICATION CHARGE

## 2025-04-03 RX ORDER — DEXTROAMPHETAMINE SACCHARATE, AMPHETAMINE ASPARTATE MONOHYDRATE, DEXTROAMPHETAMINE SULFATE AND AMPHETAMINE SULFATE 7.5; 7.5; 7.5; 7.5 MG/1; MG/1; MG/1; MG/1
30 CAPSULE, EXTENDED RELEASE ORAL EVERY MORNING
Qty: 30 CAPSULE | Refills: 0 | Status: SHIPPED | OUTPATIENT
Start: 2025-04-03 | End: 2025-05-03

## 2025-04-03 RX ORDER — MODAFINIL 200 MG/1
200 TABLET ORAL 2 TIMES DAILY
Qty: 60 TABLET | Refills: 5 | Status: SHIPPED | OUTPATIENT
Start: 2025-04-03 | End: 2025-09-30

## 2025-04-03 RX ORDER — DEXTROAMPHETAMINE SACCHARATE, AMPHETAMINE ASPARTATE, DEXTROAMPHETAMINE SULFATE AND AMPHETAMINE SULFATE 3.75; 3.75; 3.75; 3.75 MG/1; MG/1; MG/1; MG/1
15 TABLET ORAL 3 TIMES DAILY
Qty: 90 TABLET | Refills: 0 | Status: SHIPPED | OUTPATIENT
Start: 2025-04-03 | End: 2025-05-03

## 2025-04-03 ASSESSMENT — SLEEP AND FATIGUE QUESTIONNAIRES
SITING INACTIVE IN A PUBLIC PLACE LIKE A CLASS ROOM OR A MOVIE THEATER: HIGH CHANCE OF DOZING
HOW LIKELY ARE YOU TO NOD OFF OR FALL ASLEEP WHILE WATCHING TV: HIGH CHANCE OF DOZING
HOW LIKELY ARE YOU TO NOD OFF OR FALL ASLEEP WHILE SITTING QUIETLY AFTER LUNCH WITHOUT ALCOHOL: HIGH CHANCE OF DOZING
HOW LIKELY ARE YOU TO NOD OFF OR FALL ASLEEP WHEN YOU ARE A PASSENGER IN A CAR FOR AN HOUR WITHOUT A BREAK: HIGH CHANCE OF DOZING
DIFFICULTY_STAYING_ASLEEP: MODERATE
HOW LIKELY ARE YOU TO NOD OFF OR FALL ASLEEP WHILE SITTING AND TALKING TO SOMEONE: MODERATE CHANCE OF DOZING
HOW LIKELY ARE YOU TO NOD OFF OR FALL ASLEEP WHILE SITTING AND READING: HIGH CHANCE OF DOZING
SLEEP_PROBLEM_NOTICEABLE_TO_OTHERS: VERY MUCH NOTICEABLE
SATISFACTION_WITH_CURRENT_SLEEP_PATTERN: VERY DISSATISFIED
WORRIED_DISTRESSED_DUE_TO_SLEEP: SOMEWHAT
SLEEP_PROBLEM_INTERFERES_DAILY_ACTIVITIES: VERY MUCH NOTICEABLE
HOW LIKELY ARE YOU TO NOD OFF OR FALL ASLEEP WHILE LYING DOWN TO REST IN THE AFTERNOON WHEN CIRCUMSTANCES PERMIT: HIGH CHANCE OF DOZING
HOW LIKELY ARE YOU TO NOD OFF OR FALL ASLEEP IN A CAR, WHILE STOPPED FOR A FEW MINUTES IN TRAFFIC: HIGH CHANCE OF DOZING
ESS-CHAD TOTAL SCORE: 23

## 2025-04-03 ASSESSMENT — PAIN SCALES - GENERAL: PAINLEVEL_OUTOF10: 4

## 2025-04-03 NOTE — PROGRESS NOTES
Patient: Lisa Ramirez    [id number]  : 2005 -- AGE 20 y.o.    Provider: Ezekiel Aguilera MD     Location [facility]   Service Date: 4/3/2025              [facility]  Followup Visit Note     ASSESSMENT AND PLAN   Mr. Ramirez is a 20 y.o. adult and he returns in followup to the [facility] for the problems listed below on 25     Problem List, Orders, Assessment, Recommendations:  Problem List Items Addressed This Visit             ICD-10-CM    Idiopathic hypersomnia G47.11     MSLT showed MSL of 6.5 min with no SOREM.  The PSG the night before demonstrated 500 minutes of sleep time  She is extremely sleepy -> reported / on ESS.    She tried Modafinil 200 mg and didn't like how it made her feel.  She felt her heart racing really fast but wasn't sure if she was having asthma attack as well.  She stopped after 2 days and does not want to try it again.    WE discussed about other options such as Adderall, Ritalin and Xywav.  She doesn't want to take Xywav as she is afraid of the worsening of anxiety (already pretty anxious because she is taking [employer] soon).  She will keep me posted in a week via [employer] message    We agreed to try Adderall first, we will start with 20 mg XR in the morning and 10 mg IR TID PRN.  She is instructed to get herself started slowly and see how well she can tolerate the medication    Update on 2024    Did best with Adderall IR 10 mg at 7:30 am with Modafinil 200 mg, then Adderall XR 20 mg at 11:00 am, then IR 10 mg with Modafinil 200 mg again at 2 pm.    However, the improvement is still insufficient.  I encouraged her to try to IR 10 mg additionally at 5-6 pm to help with her early evening symptoms.  Patient is agreeable and will let me know via [employer] how much medication she has left for me to figure out when to send in the refills    She told me that she wants to stay with me as her  "sleep provider as I told her that she shouldn't be receiving controlled substance for the same problem from more than one provider (she also saw Dr. Chris Ching at [facility] couple times in the past).    Update on 4/5/2025    Much better compared to prior.  However, the current regimen still inadequate in controlling her symptoms.  She is 75% better though.    We will increase XR to 30 mg daily, and PRN IR formulation to 15 mg TID.    We will update CSA and UDS at follow up visit               Seizure (Multi) R56.9     Seizure medications seem to be worsening her daytime sleepiness.  She is adjusting, but still needs some help.    She will continue to follow-up with neurology.  We will adjust stimulants to help.          Other Visit Diagnoses         Codes    Narcolepsy with cataplexy ([facility])    -  Primary G47.411    Relevant Medications    amphetamine-dextroamphetamine XR (Adderall XR) 30 mg 24 hr capsule    amphetamine-dextroamphetamine (AdderalL) 15 mg tablet    Other Relevant Orders    Follow Up In Adult Sleep Medicine    Hypersomnia     G47.10    Relevant Medications    modafinil (Provigil) 200 mg tablet    Other Relevant Orders    Follow Up In Adult Sleep Medicine          Disposition  Return to clinic in 4 months        HISTORY OF PRESENT ILLNESS     HISTORY OF PRESENT ILLNESS   Lisa Ramirez \"Alf" is a 20 y.o. adult with history of Idiopathic Hypersomnia and seizure  presents to [facility] for followup.     Assessment and plan from last visit:   Mr. Ramirez is a 19 y.o. adult and he returns in followup to the [facility] for the problems listed below on 12/27/24      Problem List, Orders, Assessment, Recommendations:  Problem List Items Addressed This Visit               ICD-10-CM     Depression F32.A       Stable on Wellbutrin 450 mg daily, however, with her epilepsy history, she needs some dosage adjustment.  Neuro and Psych are working on " this.     No SI/HI           Idiopathic hypersomnia - Primary G47.11       MSLT showed MSL of 6.5 min with no SOREM.  The PSG the night before demonstrated 500 minutes of sleep time  She is extremely sleepy -> reported 24/24 on ESS.     She tried Modafinil 200 mg and didn't like how it made her feel.  She felt her heart racing really fast but wasn't sure if she was having asthma attack as well.  She stopped after 2 days and does not want to try it again.     WE discussed about other options such as Adderall, Ritalin and Xywav.  She doesn't want to take Xywav as she is afraid of the worsening of anxiety (already pretty anxious because she is taking [employer] soon).  She will keep me posted in a week via [employer] message     We agreed to try Adderall first, we will start with 20 mg XR in the morning and 10 mg IR TID PRN.  She is instructed to get herself started slowly and see how well she can tolerate the medication     Update on 12/26/2024     Did best with Adderall IR 10 mg at 7:30 am with Modafinil 200 mg, then Adderall XR 20 mg at 11:00 am, then IR 10 mg with Modafinil 200 mg again at 2 pm.     However, the improvement is still insufficient.  I encouraged her to try to IR 10 mg additionally at 5-6 pm to help with her early evening symptoms.  Patient is agreeable and will let me know via [employer] how much medication she has left for me to figure out when to send in the refills     She told me that she wants to stay with me as her sleep provider as I told her that she shouldn't be receiving controlled substance for the same problem from more than one provider (she also saw Dr. Chris Ching at [facility] couple times in the past).               Disposition  Return to clinic in 1.5 months    Current History    On today's visit, the patient reports that she takes her meds per the following schedule    10 am, 1-3 pm, 6 pm (10 mg IR Adderall)  Noon (20 mg XR Adderall)  Modafinil 10 am, 1-3 pm    She states that with the above regimen,  she is about 75% better but still struggles sometimes to stay awake as her seizure is difficult to control and neurology has been adjusting her medication which some of them makes her feel sleepier than before.    She is tolerating Adderall very well though.    Of note, she reports that after she was given a dose of midazolam by anesthesia, she felt that she slept very well that time.    Daytime Symptoms  Patient reports: excessively sleepy during the day  Napping habit(Optional): Yes, patient naps irregularly for varying length. Patient reports naps are: refreshing  Fatigue concerns: Patient struggles to carry out day to day responsibilities.    ESS: 23  VINOD: 16  FOSQ: 16    PHYSICAL EXAM     VITAL SIGNS: Temp 36.4 °C (97.6 °F) (Temporal)   Wt 81.2 kg (179 lb)   BMI 28.89 kg/m²      PREVIOUS WEIGHTS:  Wt Readings from Last 3 Encounters:   04/03/25 81.2 kg (179 lb)   01/21/25 85.7 kg (189 lb)   01/16/25 94.3 kg (208 lb)

## 2025-04-05 PROBLEM — E66.9 OBESITY (BMI 30-39.9): Status: RESOLVED | Noted: 2024-09-18 | Resolved: 2025-04-05

## 2025-04-05 NOTE — ASSESSMENT & PLAN NOTE
MSLT showed MSL of 6.5 min with no SOREM.  The PSG the night before demonstrated 500 minutes of sleep time  She is extremely sleepy -> reported 24/24 on ESS.    She tried Modafinil 200 mg and didn't like how it made her feel.  She felt her heart racing really fast but wasn't sure if she was having asthma attack as well.  She stopped after 2 days and does not want to try it again.    WE discussed about other options such as Adderall, Ritalin and Xywav.  She doesn't want to take Xywav as she is afraid of the worsening of anxiety (already pretty anxious because she is taking [employer] soon).  She will keep me posted in a week via [employer] message    We agreed to try Adderall first, we will start with 20 mg XR in the morning and 10 mg IR TID PRN.  She is instructed to get herself started slowly and see how well she can tolerate the medication    Update on 12/26/2024    Did best with Adderall IR 10 mg at 7:30 am with Modafinil 200 mg, then Adderall XR 20 mg at 11:00 am, then IR 10 mg with Modafinil 200 mg again at 2 pm.    However, the improvement is still insufficient.  I encouraged her to try to IR 10 mg additionally at 5-6 pm to help with her early evening symptoms.  Patient is agreeable and will let me know via [employer] how much medication she has left for me to figure out when to send in the refills    She told me that she wants to stay with me as her sleep provider as I told her that she shouldn't be receiving controlled substance for the same problem from more than one provider (she also saw Dr. Chris Ching at [facility] couple times in the past).    Update on 4/5/2025    Much better compared to prior.  However, the current regimen still inadequate in controlling her symptoms.  She is 75% better though.    We will increase XR to 30 mg daily, and PRN IR formulation to 15 mg TID.    We will update CSA and UDS at follow up visit

## 2025-04-05 NOTE — ASSESSMENT & PLAN NOTE
Seizure medications seem to be worsening her daytime sleepiness.  She is adjusting, but still needs some help.    She will continue to follow-up with neurology.  We will adjust stimulants to help.

## 2025-04-07 PROCEDURE — RXMED WILLOW AMBULATORY MEDICATION CHARGE

## 2025-04-14 ENCOUNTER — PHARMACY VISIT (OUTPATIENT)
Dept: PHARMACY | Facility: CLINIC | Age: 20
End: 2025-04-14
Payer: COMMERCIAL

## 2025-04-17 ENCOUNTER — APPOINTMENT (OUTPATIENT)
Dept: NEUROLOGY | Facility: HOSPITAL | Age: 20
DRG: 883 | End: 2025-04-17
Payer: COMMERCIAL

## 2025-04-17 ENCOUNTER — CLINICAL SUPPORT (OUTPATIENT)
Dept: EMERGENCY MEDICINE | Facility: HOSPITAL | Age: 20
DRG: 883 | End: 2025-04-17
Payer: COMMERCIAL

## 2025-04-17 ENCOUNTER — HOSPITAL ENCOUNTER (INPATIENT)
Facility: HOSPITAL | Age: 20
DRG: 883 | End: 2025-04-17
Attending: EMERGENCY MEDICINE | Admitting: PSYCHIATRY & NEUROLOGY
Payer: COMMERCIAL

## 2025-04-17 ENCOUNTER — APPOINTMENT (OUTPATIENT)
Dept: RADIOLOGY | Facility: HOSPITAL | Age: 20
DRG: 883 | End: 2025-04-17
Payer: COMMERCIAL

## 2025-04-17 DIAGNOSIS — F32.A DEPRESSION, UNSPECIFIED DEPRESSION TYPE: ICD-10-CM

## 2025-04-17 DIAGNOSIS — R56.9 SEIZURE (MULTI): Primary | ICD-10-CM

## 2025-04-17 LAB
ALBUMIN SERPL BCP-MCNC: 3.9 G/DL (ref 3.4–5)
ALBUMIN SERPL BCP-MCNC: 3.9 G/DL (ref 3.4–5)
ALBUMIN SERPL BCP-MCNC: 4.3 G/DL (ref 3.4–5)
ALBUMIN SERPL BCP-MCNC: 4.3 G/DL (ref 3.4–5)
ALP SERPL-CCNC: 53 U/L (ref 33–110)
ALP SERPL-CCNC: 53 U/L (ref 33–110)
ALT SERPL W P-5'-P-CCNC: 9 U/L (ref 7–45)
ALT SERPL W P-5'-P-CCNC: 9 U/L (ref 7–45)
AMPHETAMINES UR QL SCN: ABNORMAL
AMPHETAMINES UR QL SCN: ABNORMAL
ANION GAP BLDV CALCULATED.4IONS-SCNC: 11 MMOL/L (ref 10–25)
ANION GAP BLDV CALCULATED.4IONS-SCNC: 11 MMOL/L (ref 10–25)
ANION GAP BLDV CALCULATED.4IONS-SCNC: 9 MMOL/L (ref 10–25)
ANION GAP BLDV CALCULATED.4IONS-SCNC: 9 MMOL/L (ref 10–25)
ANION GAP SERPL CALC-SCNC: 11 MMOL/L (ref 10–20)
ANION GAP SERPL CALC-SCNC: 11 MMOL/L (ref 10–20)
ANION GAP SERPL CALC-SCNC: 12 MMOL/L (ref 10–20)
ANION GAP SERPL CALC-SCNC: 12 MMOL/L (ref 10–20)
APTT PPP: 30 SECONDS (ref 26–36)
APTT PPP: 30 SECONDS (ref 26–36)
AST SERPL W P-5'-P-CCNC: 9 U/L (ref 9–39)
AST SERPL W P-5'-P-CCNC: 9 U/L (ref 9–39)
B-HCG SERPL-ACNC: <3 MIU/ML
B-HCG SERPL-ACNC: <3 MIU/ML
BARBITURATES UR QL SCN: ABNORMAL
BARBITURATES UR QL SCN: ABNORMAL
BASE EXCESS BLDV CALC-SCNC: -0.8 MMOL/L (ref -2–3)
BASE EXCESS BLDV CALC-SCNC: -0.8 MMOL/L (ref -2–3)
BASE EXCESS BLDV CALC-SCNC: -2.5 MMOL/L (ref -2–3)
BASE EXCESS BLDV CALC-SCNC: -2.5 MMOL/L (ref -2–3)
BASOPHILS # BLD AUTO: 0.05 X10*3/UL (ref 0–0.1)
BASOPHILS # BLD AUTO: 0.05 X10*3/UL (ref 0–0.1)
BASOPHILS NFR BLD AUTO: 0.8 %
BASOPHILS NFR BLD AUTO: 0.8 %
BENZODIAZ UR QL SCN: ABNORMAL
BENZODIAZ UR QL SCN: ABNORMAL
BILIRUB SERPL-MCNC: 0.5 MG/DL (ref 0–1.2)
BILIRUB SERPL-MCNC: 0.5 MG/DL (ref 0–1.2)
BODY TEMPERATURE: 37 DEGREES CELSIUS
BUN SERPL-MCNC: 7 MG/DL (ref 6–23)
BZE UR QL SCN: ABNORMAL
BZE UR QL SCN: ABNORMAL
CA-I BLDV-SCNC: 1.1 MMOL/L (ref 1.1–1.33)
CA-I BLDV-SCNC: 1.1 MMOL/L (ref 1.1–1.33)
CA-I BLDV-SCNC: 1.15 MMOL/L (ref 1.1–1.33)
CA-I BLDV-SCNC: 1.15 MMOL/L (ref 1.1–1.33)
CALCIUM SERPL-MCNC: 8.5 MG/DL (ref 8.6–10.6)
CALCIUM SERPL-MCNC: 8.5 MG/DL (ref 8.6–10.6)
CALCIUM SERPL-MCNC: 8.7 MG/DL (ref 8.6–10.6)
CALCIUM SERPL-MCNC: 8.7 MG/DL (ref 8.6–10.6)
CANNABINOIDS UR QL SCN: ABNORMAL
CANNABINOIDS UR QL SCN: ABNORMAL
CHLORIDE BLDV-SCNC: 105 MMOL/L (ref 98–107)
CHLORIDE BLDV-SCNC: 105 MMOL/L (ref 98–107)
CHLORIDE BLDV-SCNC: 108 MMOL/L (ref 98–107)
CHLORIDE BLDV-SCNC: 108 MMOL/L (ref 98–107)
CHLORIDE SERPL-SCNC: 104 MMOL/L (ref 98–107)
CHLORIDE SERPL-SCNC: 104 MMOL/L (ref 98–107)
CHLORIDE SERPL-SCNC: 106 MMOL/L (ref 98–107)
CHLORIDE SERPL-SCNC: 106 MMOL/L (ref 98–107)
CO2 SERPL-SCNC: 25 MMOL/L (ref 21–32)
CREAT SERPL-MCNC: 0.74 MG/DL (ref 0.5–1.05)
CREAT SERPL-MCNC: 0.74 MG/DL (ref 0.5–1.05)
CREAT SERPL-MCNC: 0.81 MG/DL (ref 0.5–1.05)
CREAT SERPL-MCNC: 0.81 MG/DL (ref 0.5–1.05)
EGFRCR SERPLBLD CKD-EPI 2021: >90 ML/MIN/1.73M*2
EOSINOPHIL # BLD AUTO: 0 X10*3/UL (ref 0–0.7)
EOSINOPHIL # BLD AUTO: 0 X10*3/UL (ref 0–0.7)
EOSINOPHIL NFR BLD AUTO: 0 %
EOSINOPHIL NFR BLD AUTO: 0 %
ERYTHROCYTE [DISTWIDTH] IN BLOOD BY AUTOMATED COUNT: 14.1 % (ref 11.5–14.5)
FENTANYL+NORFENTANYL UR QL SCN: ABNORMAL
FENTANYL+NORFENTANYL UR QL SCN: ABNORMAL
GLUCOSE BLDV-MCNC: 83 MG/DL (ref 74–99)
GLUCOSE BLDV-MCNC: 83 MG/DL (ref 74–99)
GLUCOSE BLDV-MCNC: 87 MG/DL (ref 74–99)
GLUCOSE BLDV-MCNC: 87 MG/DL (ref 74–99)
GLUCOSE SERPL-MCNC: 83 MG/DL (ref 74–99)
GLUCOSE SERPL-MCNC: 83 MG/DL (ref 74–99)
GLUCOSE SERPL-MCNC: 86 MG/DL (ref 74–99)
GLUCOSE SERPL-MCNC: 86 MG/DL (ref 74–99)
HCO3 BLDV-SCNC: 23.7 MMOL/L (ref 22–26)
HCO3 BLDV-SCNC: 23.7 MMOL/L (ref 22–26)
HCO3 BLDV-SCNC: 24.3 MMOL/L (ref 22–26)
HCO3 BLDV-SCNC: 24.3 MMOL/L (ref 22–26)
HCT VFR BLD AUTO: 34.2 % (ref 36–46)
HCT VFR BLD AUTO: 34.2 % (ref 36–46)
HCT VFR BLD AUTO: 36.3 % (ref 36–46)
HCT VFR BLD AUTO: 36.3 % (ref 36–46)
HCT VFR BLD EST: 33 % (ref 36–46)
HCT VFR BLD EST: 33 % (ref 36–46)
HCT VFR BLD EST: 38 % (ref 36–46)
HCT VFR BLD EST: 38 % (ref 36–46)
HGB BLD-MCNC: 10.8 G/DL (ref 12–16)
HGB BLD-MCNC: 10.8 G/DL (ref 12–16)
HGB BLD-MCNC: 11.7 G/DL (ref 12–16)
HGB BLD-MCNC: 11.7 G/DL (ref 12–16)
HGB BLDV-MCNC: 10.9 G/DL (ref 12–16)
HGB BLDV-MCNC: 10.9 G/DL (ref 12–16)
HGB BLDV-MCNC: 12.5 G/DL (ref 12–16)
HGB BLDV-MCNC: 12.5 G/DL (ref 12–16)
HOLD SPECIMEN: NORMAL
HOLD SPECIMEN: NORMAL
IMM GRANULOCYTES # BLD AUTO: 0.03 X10*3/UL (ref 0–0.7)
IMM GRANULOCYTES # BLD AUTO: 0.03 X10*3/UL (ref 0–0.7)
IMM GRANULOCYTES NFR BLD AUTO: 0.5 % (ref 0–0.9)
IMM GRANULOCYTES NFR BLD AUTO: 0.5 % (ref 0–0.9)
INHALED O2 CONCENTRATION: 30 %
INHALED O2 CONCENTRATION: 30 %
INR PPP: 1.1 (ref 0.9–1.1)
INR PPP: 1.1 (ref 0.9–1.1)
LACTATE BLDV-SCNC: 0.6 MMOL/L (ref 0.4–2)
LACTATE BLDV-SCNC: 0.6 MMOL/L (ref 0.4–2)
LACTATE BLDV-SCNC: 0.8 MMOL/L (ref 0.4–2)
LACTATE BLDV-SCNC: 0.8 MMOL/L (ref 0.4–2)
LAMOTRIGINE SERPL-MCNC: <1 UG/ML (ref 2.5–15)
LAMOTRIGINE SERPL-MCNC: <1 UG/ML (ref 2.5–15)
LYMPHOCYTES # BLD AUTO: 1.87 X10*3/UL (ref 1.2–4.8)
LYMPHOCYTES # BLD AUTO: 1.87 X10*3/UL (ref 1.2–4.8)
LYMPHOCYTES NFR BLD AUTO: 28.2 %
LYMPHOCYTES NFR BLD AUTO: 28.2 %
MAGNESIUM SERPL-MCNC: 1.88 MG/DL (ref 1.6–2.4)
MAGNESIUM SERPL-MCNC: 1.88 MG/DL (ref 1.6–2.4)
MAGNESIUM SERPL-MCNC: 1.93 MG/DL (ref 1.6–2.4)
MAGNESIUM SERPL-MCNC: 1.93 MG/DL (ref 1.6–2.4)
MCH RBC QN AUTO: 23.3 PG (ref 26–34)
MCHC RBC AUTO-ENTMCNC: 31.6 G/DL (ref 32–36)
MCHC RBC AUTO-ENTMCNC: 31.6 G/DL (ref 32–36)
MCHC RBC AUTO-ENTMCNC: 32.2 G/DL (ref 32–36)
MCHC RBC AUTO-ENTMCNC: 32.2 G/DL (ref 32–36)
MCV RBC AUTO: 72 FL (ref 80–100)
MCV RBC AUTO: 72 FL (ref 80–100)
MCV RBC AUTO: 74 FL (ref 80–100)
MCV RBC AUTO: 74 FL (ref 80–100)
METHADONE UR QL SCN: ABNORMAL
METHADONE UR QL SCN: ABNORMAL
MONOCYTES # BLD AUTO: 0.73 X10*3/UL (ref 0.1–1)
MONOCYTES # BLD AUTO: 0.73 X10*3/UL (ref 0.1–1)
MONOCYTES NFR BLD AUTO: 11 %
MONOCYTES NFR BLD AUTO: 11 %
NEUTROPHILS # BLD AUTO: 3.96 X10*3/UL (ref 1.2–7.7)
NEUTROPHILS # BLD AUTO: 3.96 X10*3/UL (ref 1.2–7.7)
NEUTROPHILS NFR BLD AUTO: 59.5 %
NEUTROPHILS NFR BLD AUTO: 59.5 %
NRBC BLD-RTO: 0 /100 WBCS (ref 0–0)
OPIATES UR QL SCN: ABNORMAL
OPIATES UR QL SCN: ABNORMAL
OXYCODONE+OXYMORPHONE UR QL SCN: ABNORMAL
OXYCODONE+OXYMORPHONE UR QL SCN: ABNORMAL
OXYHGB MFR BLDV: 37.4 % (ref 45–75)
OXYHGB MFR BLDV: 37.4 % (ref 45–75)
OXYHGB MFR BLDV: 83.7 % (ref 45–75)
OXYHGB MFR BLDV: 83.7 % (ref 45–75)
PCO2 BLDV: 41 MM HG (ref 41–51)
PCO2 BLDV: 41 MM HG (ref 41–51)
PCO2 BLDV: 46 MM HG (ref 41–51)
PCO2 BLDV: 46 MM HG (ref 41–51)
PCP UR QL SCN: ABNORMAL
PCP UR QL SCN: ABNORMAL
PH BLDV: 7.32 PH (ref 7.33–7.43)
PH BLDV: 7.32 PH (ref 7.33–7.43)
PH BLDV: 7.38 PH (ref 7.33–7.43)
PH BLDV: 7.38 PH (ref 7.33–7.43)
PHOSPHATE SERPL-MCNC: 2.6 MG/DL (ref 2.5–4.9)
PHOSPHATE SERPL-MCNC: 2.6 MG/DL (ref 2.5–4.9)
PLATELET # BLD AUTO: 216 X10*3/UL (ref 150–450)
PLATELET # BLD AUTO: 216 X10*3/UL (ref 150–450)
PLATELET # BLD AUTO: 224 X10*3/UL (ref 150–450)
PLATELET # BLD AUTO: 224 X10*3/UL (ref 150–450)
PO2 BLDV: 21 MM HG (ref 35–45)
PO2 BLDV: 21 MM HG (ref 35–45)
PO2 BLDV: 53 MM HG (ref 35–45)
PO2 BLDV: 53 MM HG (ref 35–45)
POTASSIUM BLDV-SCNC: 3.7 MMOL/L (ref 3.5–5.3)
POTASSIUM BLDV-SCNC: 3.7 MMOL/L (ref 3.5–5.3)
POTASSIUM BLDV-SCNC: 4.1 MMOL/L (ref 3.5–5.3)
POTASSIUM BLDV-SCNC: 4.1 MMOL/L (ref 3.5–5.3)
POTASSIUM SERPL-SCNC: 3.5 MMOL/L (ref 3.5–5.3)
POTASSIUM SERPL-SCNC: 3.5 MMOL/L (ref 3.5–5.3)
POTASSIUM SERPL-SCNC: 4.1 MMOL/L (ref 3.5–5.3)
POTASSIUM SERPL-SCNC: 4.1 MMOL/L (ref 3.5–5.3)
PROT SERPL-MCNC: 6.6 G/DL (ref 6.4–8.2)
PROT SERPL-MCNC: 6.6 G/DL (ref 6.4–8.2)
PROTHROMBIN TIME: 12.1 SECONDS (ref 9.8–12.4)
PROTHROMBIN TIME: 12.1 SECONDS (ref 9.8–12.4)
RBC # BLD AUTO: 4.63 X10*6/UL (ref 4–5.2)
RBC # BLD AUTO: 4.63 X10*6/UL (ref 4–5.2)
RBC # BLD AUTO: 5.02 X10*6/UL (ref 4–5.2)
RBC # BLD AUTO: 5.02 X10*6/UL (ref 4–5.2)
SAO2 % BLDV: 38 % (ref 45–75)
SAO2 % BLDV: 38 % (ref 45–75)
SAO2 % BLDV: 86 % (ref 45–75)
SAO2 % BLDV: 86 % (ref 45–75)
SODIUM BLDV-SCNC: 137 MMOL/L (ref 136–145)
SODIUM SERPL-SCNC: 137 MMOL/L (ref 136–145)
SODIUM SERPL-SCNC: 137 MMOL/L (ref 136–145)
SODIUM SERPL-SCNC: 138 MMOL/L (ref 136–145)
SODIUM SERPL-SCNC: 138 MMOL/L (ref 136–145)
WBC # BLD AUTO: 6.6 X10*3/UL (ref 4.4–11.3)
WBC # BLD AUTO: 6.6 X10*3/UL (ref 4.4–11.3)
WBC # BLD AUTO: 9.2 X10*3/UL (ref 4.4–11.3)
WBC # BLD AUTO: 9.2 X10*3/UL (ref 4.4–11.3)

## 2025-04-17 PROCEDURE — 2500000004 HC RX 250 GENERAL PHARMACY W/ HCPCS (ALT 636 FOR OP/ED)

## 2025-04-17 PROCEDURE — 84132 ASSAY OF SERUM POTASSIUM: CPT | Performed by: EMERGENCY MEDICINE

## 2025-04-17 PROCEDURE — 84132 ASSAY OF SERUM POTASSIUM: CPT

## 2025-04-17 PROCEDURE — 36415 COLL VENOUS BLD VENIPUNCTURE: CPT | Performed by: EMERGENCY MEDICINE

## 2025-04-17 PROCEDURE — 83735 ASSAY OF MAGNESIUM: CPT

## 2025-04-17 PROCEDURE — 31500 INSERT EMERGENCY AIRWAY: CPT | Performed by: EMERGENCY MEDICINE

## 2025-04-17 PROCEDURE — 85025 COMPLETE CBC W/AUTO DIFF WBC: CPT | Performed by: EMERGENCY MEDICINE

## 2025-04-17 PROCEDURE — 83735 ASSAY OF MAGNESIUM: CPT | Performed by: EMERGENCY MEDICINE

## 2025-04-17 PROCEDURE — 2020000001 HC ICU ROOM DAILY

## 2025-04-17 PROCEDURE — 70450 CT HEAD/BRAIN W/O DYE: CPT

## 2025-04-17 PROCEDURE — 85027 COMPLETE CBC AUTOMATED: CPT

## 2025-04-17 PROCEDURE — 80069 RENAL FUNCTION PANEL: CPT | Mod: CCI

## 2025-04-17 PROCEDURE — 94002 VENT MGMT INPAT INIT DAY: CPT

## 2025-04-17 PROCEDURE — 80307 DRUG TEST PRSMV CHEM ANLYZR: CPT

## 2025-04-17 PROCEDURE — 2500000004 HC RX 250 GENERAL PHARMACY W/ HCPCS (ALT 636 FOR OP/ED): Mod: JZ

## 2025-04-17 PROCEDURE — 70450 CT HEAD/BRAIN W/O DYE: CPT | Performed by: RADIOLOGY

## 2025-04-17 PROCEDURE — 82435 ASSAY OF BLOOD CHLORIDE: CPT | Performed by: EMERGENCY MEDICINE

## 2025-04-17 PROCEDURE — 71045 X-RAY EXAM CHEST 1 VIEW: CPT

## 2025-04-17 PROCEDURE — 95822 EEG COMA OR SLEEP ONLY: CPT | Performed by: PSYCHIATRY & NEUROLOGY

## 2025-04-17 PROCEDURE — 96374 THER/PROPH/DIAG INJ IV PUSH: CPT | Mod: 59

## 2025-04-17 PROCEDURE — 85610 PROTHROMBIN TIME: CPT

## 2025-04-17 PROCEDURE — 2500000002 HC RX 250 W HCPCS SELF ADMINISTERED DRUGS (ALT 637 FOR MEDICARE OP, ALT 636 FOR OP/ED): Performed by: REGISTERED NURSE

## 2025-04-17 PROCEDURE — 84702 CHORIONIC GONADOTROPIN TEST: CPT | Performed by: EMERGENCY MEDICINE

## 2025-04-17 PROCEDURE — 99291 CRITICAL CARE FIRST HOUR: CPT

## 2025-04-17 PROCEDURE — 2500000004 HC RX 250 GENERAL PHARMACY W/ HCPCS (ALT 636 FOR OP/ED): Mod: JZ | Performed by: EMERGENCY MEDICINE

## 2025-04-17 PROCEDURE — 93005 ELECTROCARDIOGRAM TRACING: CPT

## 2025-04-17 PROCEDURE — 80175 DRUG SCREEN QUAN LAMOTRIGINE: CPT | Performed by: EMERGENCY MEDICINE

## 2025-04-17 PROCEDURE — 2500000005 HC RX 250 GENERAL PHARMACY W/O HCPCS

## 2025-04-17 PROCEDURE — 31500 INSERT EMERGENCY AIRWAY: CPT | Performed by: STUDENT IN AN ORGANIZED HEALTH CARE EDUCATION/TRAINING PROGRAM

## 2025-04-17 PROCEDURE — 82330 ASSAY OF CALCIUM: CPT

## 2025-04-17 PROCEDURE — 80053 COMPREHEN METABOLIC PANEL: CPT

## 2025-04-17 PROCEDURE — 5A1935Z RESPIRATORY VENTILATION, LESS THAN 24 CONSECUTIVE HOURS: ICD-10-PCS | Performed by: EMERGENCY MEDICINE

## 2025-04-17 PROCEDURE — 2500000005 HC RX 250 GENERAL PHARMACY W/O HCPCS: Performed by: EMERGENCY MEDICINE

## 2025-04-17 PROCEDURE — 94640 AIRWAY INHALATION TREATMENT: CPT

## 2025-04-17 PROCEDURE — 71045 X-RAY EXAM CHEST 1 VIEW: CPT | Mod: FOREIGN READ | Performed by: RADIOLOGY

## 2025-04-17 PROCEDURE — 99291 CRITICAL CARE FIRST HOUR: CPT | Performed by: EMERGENCY MEDICINE

## 2025-04-17 PROCEDURE — 82330 ASSAY OF CALCIUM: CPT | Performed by: EMERGENCY MEDICINE

## 2025-04-17 PROCEDURE — 0BH17EZ INSERTION OF ENDOTRACHEAL AIRWAY INTO TRACHEA, VIA NATURAL OR ARTIFICIAL OPENING: ICD-10-PCS | Performed by: EMERGENCY MEDICINE

## 2025-04-17 PROCEDURE — 95822 EEG COMA OR SLEEP ONLY: CPT

## 2025-04-17 PROCEDURE — 99223 1ST HOSP IP/OBS HIGH 75: CPT

## 2025-04-17 RX ORDER — FENTANYL CITRATE-0.9 % NACL/PF 10 MCG/ML
25-200 PLASTIC BAG, INJECTION (ML) INTRAVENOUS CONTINUOUS
Status: DISCONTINUED | OUTPATIENT
Start: 2025-04-17 | End: 2025-04-18

## 2025-04-17 RX ORDER — DEXMEDETOMIDINE HYDROCHLORIDE 4 UG/ML
INJECTION, SOLUTION INTRAVENOUS
Status: COMPLETED
Start: 2025-04-17 | End: 2025-04-17

## 2025-04-17 RX ORDER — PROPOFOL 10 MG/ML
0-50 INJECTION, EMULSION INTRAVENOUS CONTINUOUS
Status: DISCONTINUED | OUTPATIENT
Start: 2025-04-17 | End: 2025-04-18

## 2025-04-17 RX ORDER — ALBUTEROL SULFATE 0.83 MG/ML
2.5 SOLUTION RESPIRATORY (INHALATION) EVERY 6 HOURS PRN
Status: DISCONTINUED | OUTPATIENT
Start: 2025-04-17 | End: 2025-04-21 | Stop reason: HOSPADM

## 2025-04-17 RX ORDER — DEXTROAMPHETAMINE SACCHARATE, AMPHETAMINE ASPARTATE MONOHYDRATE, DEXTROAMPHETAMINE SULFATE AND AMPHETAMINE SULFATE 2.5; 2.5; 2.5; 2.5 MG/1; MG/1; MG/1; MG/1
30 CAPSULE, EXTENDED RELEASE ORAL EVERY 24 HOURS
Refills: 0 | Status: DISCONTINUED | OUTPATIENT
Start: 2025-04-17 | End: 2025-04-21 | Stop reason: HOSPADM

## 2025-04-17 RX ORDER — FLUTICASONE PROPIONATE AND SALMETEROL 250; 50 UG/1; UG/1
1 POWDER RESPIRATORY (INHALATION)
COMMUNITY

## 2025-04-17 RX ORDER — DEXTROAMPHETAMINE SACCHARATE, AMPHETAMINE ASPARTATE, DEXTROAMPHETAMINE SULFATE AND AMPHETAMINE SULFATE 2.5; 2.5; 2.5; 2.5 MG/1; MG/1; MG/1; MG/1
15 TABLET ORAL 3 TIMES DAILY
Status: DISCONTINUED | OUTPATIENT
Start: 2025-04-17 | End: 2025-04-19

## 2025-04-17 RX ORDER — DEXTROAMPHETAMINE SACCHARATE, AMPHETAMINE ASPARTATE MONOHYDRATE, DEXTROAMPHETAMINE SULFATE AND AMPHETAMINE SULFATE 7.5; 7.5; 7.5; 7.5 MG/1; MG/1; MG/1; MG/1
30 CAPSULE, EXTENDED RELEASE ORAL DAILY
COMMUNITY
Start: 2025-04-03

## 2025-04-17 RX ORDER — ACETAMINOPHEN 160 MG/5ML
650 SOLUTION ORAL EVERY 4 HOURS PRN
Status: DISCONTINUED | OUTPATIENT
Start: 2025-04-17 | End: 2025-04-21 | Stop reason: HOSPADM

## 2025-04-17 RX ORDER — ENOXAPARIN SODIUM 100 MG/ML
40 INJECTION SUBCUTANEOUS EVERY 24 HOURS
Status: DISCONTINUED | OUTPATIENT
Start: 2025-04-17 | End: 2025-04-21 | Stop reason: HOSPADM

## 2025-04-17 RX ORDER — ALBUTEROL SULFATE 90 UG/1
4 INHALANT RESPIRATORY (INHALATION) EVERY 6 HOURS PRN
Status: DISCONTINUED | OUTPATIENT
Start: 2025-04-17 | End: 2025-04-21 | Stop reason: HOSPADM

## 2025-04-17 RX ORDER — LEVETIRACETAM 15 MG/ML
1500 INJECTION INTRAVASCULAR ONCE
Status: COMPLETED | OUTPATIENT
Start: 2025-04-17 | End: 2025-04-17

## 2025-04-17 RX ORDER — SODIUM CHLORIDE 9 MG/ML
75 INJECTION, SOLUTION INTRAVENOUS CONTINUOUS
Status: ACTIVE | OUTPATIENT
Start: 2025-04-17 | End: 2025-04-18

## 2025-04-17 RX ORDER — ALBUTEROL SULFATE 90 UG/1
4 INHALANT RESPIRATORY (INHALATION) EVERY 6 HOURS PRN
COMMUNITY

## 2025-04-17 RX ORDER — PROPOFOL 10 MG/ML
70 INJECTION, EMULSION INTRAVENOUS ONCE
Status: COMPLETED | OUTPATIENT
Start: 2025-04-17 | End: 2025-04-17

## 2025-04-17 RX ORDER — ACETAMINOPHEN 325 MG/1
650 TABLET ORAL EVERY 4 HOURS PRN
Status: DISCONTINUED | OUTPATIENT
Start: 2025-04-17 | End: 2025-04-21 | Stop reason: HOSPADM

## 2025-04-17 RX ORDER — POLYETHYLENE GLYCOL 3350 17 G/17G
17 POWDER, FOR SOLUTION ORAL DAILY
Status: DISCONTINUED | OUTPATIENT
Start: 2025-04-17 | End: 2025-04-21 | Stop reason: HOSPADM

## 2025-04-17 RX ORDER — NORETHINDRONE 0.35 MG/1
1 TABLET ORAL DAILY
Status: DISCONTINUED | OUTPATIENT
Start: 2025-04-17 | End: 2025-04-21 | Stop reason: HOSPADM

## 2025-04-17 RX ORDER — LAMOTRIGINE 25 MG/1
50 TABLET ORAL NIGHTLY
COMMUNITY
End: 2025-04-17 | Stop reason: ENTERED-IN-ERROR

## 2025-04-17 RX ORDER — FERROUS SULFATE 325(65) MG
325 TABLET ORAL 2 TIMES DAILY
Status: DISCONTINUED | OUTPATIENT
Start: 2025-04-17 | End: 2025-04-21 | Stop reason: HOSPADM

## 2025-04-17 RX ORDER — IBUPROFEN 200 MG
800 TABLET ORAL EVERY 6 HOURS PRN
COMMUNITY

## 2025-04-17 RX ORDER — MODAFINIL 200 MG/1
200 TABLET ORAL 2 TIMES DAILY
COMMUNITY
Start: 2025-03-29

## 2025-04-17 RX ORDER — FERROUS SULFATE 325(65) MG
325 TABLET, DELAYED RELEASE (ENTERIC COATED) ORAL 2 TIMES DAILY
COMMUNITY

## 2025-04-17 RX ORDER — FLUTICASONE FUROATE AND VILANTEROL 200; 25 UG/1; UG/1
1 POWDER RESPIRATORY (INHALATION)
Status: DISCONTINUED | OUTPATIENT
Start: 2025-04-17 | End: 2025-04-21 | Stop reason: HOSPADM

## 2025-04-17 RX ORDER — LAMOTRIGINE 100 MG/1
100 TABLET ORAL 2 TIMES DAILY
COMMUNITY
End: 2025-04-21 | Stop reason: HOSPADM

## 2025-04-17 RX ORDER — ACETAMINOPHEN 500 MG
1000 TABLET ORAL EVERY 6 HOURS PRN
COMMUNITY

## 2025-04-17 RX ORDER — ALBUTEROL SULFATE 0.83 MG/ML
SOLUTION RESPIRATORY (INHALATION)
Status: COMPLETED
Start: 2025-04-17 | End: 2025-04-18

## 2025-04-17 RX ORDER — LEVETIRACETAM 1000 MG/1
1000 TABLET ORAL 2 TIMES DAILY
COMMUNITY
Start: 2025-03-17 | End: 2025-04-21 | Stop reason: HOSPADM

## 2025-04-17 RX ORDER — FENTANYL CITRATE 50 UG/ML
25 INJECTION, SOLUTION INTRAMUSCULAR; INTRAVENOUS ONCE
Status: DISCONTINUED | OUTPATIENT
Start: 2025-04-17 | End: 2025-04-17

## 2025-04-17 RX ORDER — GABAPENTIN 300 MG/1
300 CAPSULE ORAL DAILY
COMMUNITY
Start: 2025-03-28

## 2025-04-17 RX ORDER — SUCCINYLCHOLINE CHLORIDE 20 MG/ML
100 INJECTION INTRAMUSCULAR; INTRAVENOUS ONCE
Status: COMPLETED | OUTPATIENT
Start: 2025-04-17 | End: 2025-04-17

## 2025-04-17 RX ORDER — SUCCINYLCHOLINE CHLORIDE 20 MG/ML
INJECTION INTRAMUSCULAR; INTRAVENOUS
Status: COMPLETED
Start: 2025-04-17 | End: 2025-04-17

## 2025-04-17 RX ORDER — FENTANYL CITRATE-0.9 % NACL/PF 10 MCG/ML
PLASTIC BAG, INJECTION (ML) INTRAVENOUS
Status: COMPLETED
Start: 2025-04-17 | End: 2025-04-17

## 2025-04-17 RX ORDER — DEXTROAMPHETAMINE SACCHARATE, AMPHETAMINE ASPARTATE, DEXTROAMPHETAMINE SULFATE AND AMPHETAMINE SULFATE 3.75; 3.75; 3.75; 3.75 MG/1; MG/1; MG/1; MG/1
15 TABLET ORAL 3 TIMES DAILY
COMMUNITY
Start: 2025-04-03

## 2025-04-17 RX ORDER — DEXMEDETOMIDINE HYDROCHLORIDE 4 UG/ML
.1-1.5 INJECTION, SOLUTION INTRAVENOUS CONTINUOUS
Status: DISCONTINUED | OUTPATIENT
Start: 2025-04-17 | End: 2025-04-18

## 2025-04-17 RX ORDER — LEVETIRACETAM 5 MG/ML
500 INJECTION INTRAVASCULAR EVERY 12 HOURS
Status: DISCONTINUED | OUTPATIENT
Start: 2025-04-17 | End: 2025-04-18

## 2025-04-17 RX ORDER — LAMOTRIGINE 200 MG/1
200 TABLET ORAL EVERY MORNING
COMMUNITY
End: 2025-04-17 | Stop reason: ENTERED-IN-ERROR

## 2025-04-17 RX ORDER — PROPOFOL 10 MG/ML
INJECTION, EMULSION INTRAVENOUS
Status: DISPENSED
Start: 2025-04-17 | End: 2025-04-17

## 2025-04-17 RX ADMIN — PROPOFOL 70 MG: 10 INJECTION, EMULSION INTRAVENOUS at 09:12

## 2025-04-17 RX ADMIN — ALBUTEROL SULFATE 2.5 MG: 2.5 SOLUTION RESPIRATORY (INHALATION) at 23:19

## 2025-04-17 RX ADMIN — Medication 50 MCG/HR: at 09:29

## 2025-04-17 RX ADMIN — LEVETIRACETAM 1500 MG: 15 INJECTION INTRAVENOUS at 09:20

## 2025-04-17 RX ADMIN — ENOXAPARIN SODIUM 40 MG: 100 INJECTION SUBCUTANEOUS at 21:13

## 2025-04-17 RX ADMIN — DEXMEDETOMIDINE HYDROCHLORIDE 0.2 MCG/KG/HR: 400 INJECTION INTRAVENOUS at 21:14

## 2025-04-17 RX ADMIN — PROPOFOL 50 MCG/KG/MIN: 10 INJECTION, EMULSION INTRAVENOUS at 21:13

## 2025-04-17 RX ADMIN — PROPOFOL 30 MCG/KG/MIN: 10 INJECTION, EMULSION INTRAVENOUS at 09:15

## 2025-04-17 RX ADMIN — Medication 50 PERCENT: at 09:35

## 2025-04-17 RX ADMIN — PROPOFOL 50 MCG/KG/MIN: 10 INJECTION, EMULSION INTRAVENOUS at 17:16

## 2025-04-17 RX ADMIN — SODIUM CHLORIDE 50 ML/HR: 0.9 INJECTION, SOLUTION INTRAVENOUS at 14:07

## 2025-04-17 RX ADMIN — Medication 100 MG: at 09:35

## 2025-04-17 RX ADMIN — LEVETIRACETAM 500 MG: 5 INJECTION INTRAVENOUS at 21:14

## 2025-04-17 RX ADMIN — SUCCINYLCHOLINE CHLORIDE 100 MG: 20 INJECTION, SOLUTION INTRAMUSCULAR; INTRAVENOUS at 09:15

## 2025-04-17 RX ADMIN — SODIUM CHLORIDE, SODIUM LACTATE, POTASSIUM CHLORIDE, AND CALCIUM CHLORIDE 1000 ML: 600; 310; 30; 20 INJECTION, SOLUTION INTRAVENOUS at 09:18

## 2025-04-17 RX ADMIN — SUCCINYLCHOLINE CHLORIDE 100 MG: 20 INJECTION INTRAMUSCULAR; INTRAVENOUS at 09:15

## 2025-04-17 ASSESSMENT — PAIN SCALES - GENERAL
PAINLEVEL_OUTOF10: 9
PAINLEVEL_OUTOF10: 9

## 2025-04-17 ASSESSMENT — PAIN - FUNCTIONAL ASSESSMENT: PAIN_FUNCTIONAL_ASSESSMENT: CPOT (CRITICAL CARE PAIN OBSERVATION TOOL)

## 2025-04-17 NOTE — ED PROVIDER NOTES
Emergency Department Provider Note          History of Present Illness     CC: Seizures     History provided by: Patient  Limitations to History: None    HPI:   Lisa Ramirez is a 20 y.o.female with PMH Epilepsy, PNES (on keppra and lamotrigine), presenting to the Emergency Department for seizure-like activity.  Brought in today by EMS.  Patient's partner reports they walked into the room and found Spenser seizing on the ground.  Unknown amount of time.  Spontaneously resolved on its own.  Upon EMS arrival, was unresponsive with minimal spontaneous breathing.  Bag mask valve ventilation performed and patient was brought to the emergency department.     Allergies: None  PMHX;  - Epilepsy  - Narcolepsy  - Asthma  - Hypermobility    Meds:  - Lamotrigine  - Keppra  - Gabapentin  - Adderall  - Modafinil  - Bupropion  - Sertraline  - Albuterol/ipratropium as needed  - Symbicort as needed      Records Reviewed: Recent available ED and inpatient notes reviewed in EMR.    PMHx/PSHx:  Per HPI.   - has no past medical history on file.  - has no past surgical history on file.  - has Seizure (Multi) on their problem list.    Medications:  Current Outpatient Medications   Medication Instructions    acetaminophen (TYLENOL) 1,000 mg, oral, Every 6 hours PRN    albuterol 90 mcg/actuation inhaler 4 puffs, inhalation, Every 6 hours PRN    amphetamine-dextroamphetamine (Adderall) 15 mg tablet 15 mg, oral, 3 times daily    amphetamine-dextroamphetamine XR (Adderall XR) 30 mg 24 hr capsule 30 mg, oral, Daily    budesonide-formoteroL (Symbicort) 160-4.5 mcg/actuation inhaler 1 PUFF INHALED ORALLY EVERY MORNING AND EVERY EVENING AS NEEDED    buPROPion XL (Wellbutrin XL) 150 mg 24 hr tablet TAKE 3 TABLETS (450 MG) ORALLY DAILY IN THE MORNING    ferrous sulfate 325 mg, oral, 2 times daily, Do not crush, chew, or split.    fluticasone propion-salmeteroL (Advair Diskus) 250-50 mcg/dose diskus inhaler 1 puff, inhalation, 2 times daily RT,  Rinse mouth with water after use to reduce aftertaste and incidence of candidiasis. Do not swallow.    gabapentin (NEURONTIN) 300 mg, oral, Daily    ibuprofen 800 mg, oral, Every 6 hours PRN    ipratropium-albuteroL (Combivent Respimat)  mcg/actuation inhaler 1 PUFF INHALED ORALLY EVERY 4 HOURS AS NEEDED    lamoTRIgine (LAMICTAL) 100 mg, oral, 2 times daily    levETIRAcetam (KEPPRA) 1,000 mg, oral, 2 times daily    modafinil (PROVIGIL) 200 mg, oral, 2 times daily    nasal spray midazolam (Versed) 5 mg/spray (0.1 mL) spray,non-aerosol nasal, Once as needed    norethindrone (MICRONOR) 0.35 mg, oral, Daily    sertraline (Zoloft) 50 mg tablet TAKE 1/2 TABLET (25MG) BY MOUTH DAILY FOR 1-2 WEEKS, THEN 1 TABLET (50MG) DAILY        Allergies:  Patient has no known allergies.    Social History:  - Tobacco:  has no history on file for tobacco use.   - Alcohol:  has no history on file for alcohol use.   - Illicit Drugs:  has no history on file for drug use.     ROS:  Per HPI.       Physical Exam     Triage Vitals:  T 36.4 °C (97.5 °F)  HR 98  /89  RR 16  O2 98 % Supplemental oxygen    General: Unresponsive,  Eyes: Gaze conjugate.  No scleral icterus or injection  HENT: Normo-cephalic, atraumatic. No stridor  CV: Regular rate, regular rhythm. Radial pulses 2+ bilaterally  Resp: Minimal spontaneous respiratory effort. Clear to auscultation bilaterally  GI: Soft, non-distended, non-tender. No rebound or guarding.  MSK/Extremities: No gross bony deformities. Moving all extremities  Skin: Warm. Appropriate color  Neuro:  GCS 3. Not withdrawing or responding to commands/sternal rub.  Psych: Appropriate mood and affect          [place] Coma Scale Score: 3                    Medical Decision Making & ED Course     EKG: EKG interpreted by myself. If applicable, please see ED Course for full interpretation.    Medical Decision Making   Lisa Ramirez is a 20 y.o.female presenting to the Emergency Department for  seizure-like activity.  On arrival, blood pressure 135/80, rest of vital signs within normal limits.  Examination, GCS of 3.  Not responding or withdrawing to commands.  Multiple attempts were made at syndrome but patient continued to be completely obtunded for us.  Initial venous blood gas showed a pH of 7.38, pCO2 of 41, and lactate of 0.6.  Lower concern for major generalized tonic-clonic seizure recently.  Did not bite his tongue, did not urinate on himself.  Given 20 cc/kg of Keppra and 5 mg Versed in the trauma bay.  Continued to be nonresponsive for us and NOT protecting his airway. RSI was performed with succinylcholine and propofol.  7-0 endotracheal tube was placed successfully passed cords.  Postintubation sedation and was administered with propofol and fentanyl.  Required multiple boluses and an additional dose of ketamine to facilitate imaging. CT head negative for acute intracranial process.  Chest x-ray showed endotracheal tube 0.6 cm by the maty.  Retracted 3 cm.  Neurology team consulted for PNES versus epilepsy.  Remains otherwise hemodynamically stable in the emergency department. Will plan for admission to the NSU today for further epilepsy evaluation.      ED Course as of 04/17/25 1321   Thu Apr 17, 2025   1050 ED ATTENDING ATTESTATION:    20-year-old female with history of seizure disorder and PNES, on Keppra and lamotrigine, presented with altered mental status.  Per friend, she was found down, unclear if she had any witnessed seizure activity.  Unsure if friend gave rescue benzo or not.  On arrival patient was unresponsive being bagged by EMS.  Initial GCS was 3.  Unresponsive to painful stimuli, had some spontaneous respirations, respiratory rate around 5.  Pupils were mid to dilated bilaterally, no pinpoint.  No history of opiate use.  She was not maintaining her airway, so we moved to intubation.  She was intubated with propofol and succinylcholine, successfully.  Keppra lamotrigine  levels were sent.  Patient was loaded with 1.5 g Keppra.  She had no seizure activity with us, but suspected that she did previously versus possible postictal state.  After being intubated, patient did have movements, gagging at the tube, grabbing with her arms.  She was not following commands.  Did not have any seizure-like tremor activity.  CT head  obtained without any acute pathology.  Chest x-ray did not show any acute pathology, did show tube at the maty so was retracted.  Labs without any significant abnormalities.  Lactate noted to be normal on VBG.  Vitals remained stable throughout.  Neurology consulted, recommending EEG and admission to NSU.  She did require further sedation including ketamine initially as she was pulling at the tube.  She also required further boluses of propofol.  Currently on propofol and fentanyl drips, pending NSU bed.    Marlo Marti MD   [FF]      ED Course User Index  [FF] Marlo Marti MD         Diagnoses as of 04/17/25 1321   Seizure (Multi)       Independent Result Review and Interpretation: Relevant laboratory and radiographic results were reviewed and independently interpreted by myself.  As necessary, they are commented on in the ED Course.    Chronic conditions affecting the patient's care: As documented above in MDM.      Disposition   Admit    Yasmani Campo MD  Emergency Medicine PGY3      Procedures     Critical Care    Performed by: Yasmani Campo MD  Authorized by: Yasmani Campo MD     ? SmartLinks last updated 4/17/2025 1:21 PM        Yasmani Campo MD  Resident  04/17/25 1321       Yasmani Campo MD  Resident  04/17/25 1322

## 2025-04-17 NOTE — ED TRIAGE NOTES
Pt to ed as critical in room 3 for seizing . Pt was found in dorm room seizing for unknown time, then became unresponsive. On arrival to ed pt was bagged still unresponsive. 100% oxy

## 2025-04-17 NOTE — PROGRESS NOTES
Pharmacy Medication History Review    Lisa Ramirez is a 20 y.o. female admitted for Seizure (Multi). Pharmacy reviewed the patient's [id number] medications and allergies for accuracy.    Medications ADDED:  Lamotrigine 100 mg BID  Levetiracetam 1000 mg  Gabapentin 300 mg  Nasal Midazolam 5 mg  Adderall ER 30 mg and Adderall IR 15 mg  Modafinil 200 mg  Albuterol Inhaler   Symbicort Inhaler  Advair Inhaler  Ibuprofen 200 mg  Acetaminophen 500 mg  Iron 325 mg  Medications CHANGED:  None   Medications REMOVED:   None      The list below reflects the updated PTA list.   Prior to Admission Medications   Prescriptions Last Dose Informant   acetaminophen (Tylenol) 500 mg tablet  Other   Sig: Take 2 tablets (1,000 mg) by mouth every 6 hours if needed for mild pain (1 - 3).   albuterol 90 mcg/actuation inhaler  Other   Sig: Inhale 4 puffs every 6 hours if needed for wheezing.   amphetamine-dextroamphetamine (Adderall) 15 mg tablet  Other   Sig: Take 1 tablet (15 mg) by mouth 3 times a day.   amphetamine-dextroamphetamine XR (Adderall XR) 30 mg 24 hr capsule  Other   Sig: Take 1 capsule (30 mg) by mouth once daily.   buPROPion XL (Wellbutrin XL) 150 mg 24 hr tablet  Other   Sig: TAKE 3 TABLETS (450 MG) ORALLY DAILY IN THE MORNING   budesonide-formoteroL (Symbicort) 160-4.5 mcg/actuation inhaler  Other   Si PUFF INHALED ORALLY EVERY MORNING AND EVERY EVENING AS NEEDED   ferrous sulfate 325 (65 Fe) mg EC tablet  Other   Sig: Take 1 tablet by mouth 2 times a day. Do not crush, chew, or split.   fluticasone propion-salmeteroL (Advair Diskus) 250-50 mcg/dose diskus inhaler  Other   Sig: Inhale 1 puff 2 times a day. Rinse mouth with water after use to reduce aftertaste and incidence of candidiasis. Do not swallow.   gabapentin (Neurontin) 300 mg capsule  Other   Sig: Take 1 capsule (300 mg) by mouth once daily.   ibuprofen 200 mg tablet  Other   Sig: Take 4 tablets (800 mg) by mouth every 6 hours if needed for  mild pain (1 - 3).   ipratropium-albuteroL (Combivent Respimat)  mcg/actuation inhaler  Other   Si PUFF INHALED ORALLY EVERY 4 HOURS AS NEEDED   lamoTRIgine (LaMICtal) 100 mg tablet  Other   Sig: Take 1 tablet (100 mg) by mouth 2 times a day.Patient reports taking differently: 200 mg in the morning and 250 mg at night      levETIRAcetam (Keppra) 1,000 mg tablet  Other   Sig: Take 1 tablet (1,000 mg) by mouth 2 times a day.   modafinil (Provigil) 200 mg tablet  Other   Sig: Take 1 tablet (200 mg) by mouth 2 times a day.   nasal spray midazolam (Versed) 5 mg/spray (0.1 mL) spray,non-aerosol  Other   Sig: Administer into affected nostril(s) 1 time if needed.   norethindrone (Micronor) 0.35 mg tablet  Other   Sig: Take 1 tablet (0.35 mg) by mouth once daily.   sertraline (Zoloft) 50 mg tablet  Other   Sig: TAKE 1/2 TABLET (25MG) BY MOUTH DAILY FOR 1-2 WEEKS, THEN 1 TABLET (50MG) DAILY      Facility-Administered Medications: None        The list below reflects the updated allergy list. Please review each documented allergy for additional clarification and justification.  Allergies  Reviewed by Fina Brunner on 2025   No Known Allergies         Patient accepts M2B at discharge.     Sources:   [facility]  Pharmacy dispense history  Patient Interview Unable to provide any details  Friend Shanique Had a list of Patient Medications at bedside  [facility] [phone] was called to confirm medications dosages.    Additional Comments:  Patients partner Shanique is at bedside with a medication list prepared by the patient. Shanique is not certain about how the patient takes her medications. The patient medication list states:  Lamotrigine 200 mg in the morning and Lamotrigine 250 mg at bedtime.  Sertraline 100 mg daily.  The patients PTA List was not updated to match these dosages. The patients PTA List was left according to the last best known fill history. Any PRN medications that were listed was added to the  "PTA List.      Fina Brunner  Pharmacy Technician  04/17/25     Secure Chat preferred   If no response call [phone] or Vocera \"Med Rec\"   "

## 2025-04-17 NOTE — PROGRESS NOTES
[facility]  NEUROSCIENCE INTENSIVE CARE UNIT  DAILY PROGRESS NOTE       Patient Name: Lisa Ramirez   MRN: [id number]     Admit Date: 2025     : 2005 AGE: 20 y.o. 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 Per friend at bedside, patient had an “event” of shaking on  evening time. He was found shaking all over, with stiffening then shaking. There was L head deviation and L face clonic activity. There was foaming at the mouth. The shaking was going on, on and off, for approximately 5-10min. The patient was given 2 doses of home intranasal midazolam and episode stopped and patient slept through the night. This morning, , patient was found with LUE extension/stiffening, following by full body stiffening and then shaking. There was L head deviation, foaming at the mouth. Eyes alternated from open to closed. There was no bowel/bladder incontinence and no tongue biting. Brought to ER and intubated d/t poor mentation and minimal spontaneous respiratory effort. On arrival there was no withdraw to nox stim and patient was not responding to any commands or sternal rub. Per ER, no witnessed seizure-like activity since arrived. After intubation, patient became agitated and required uptitration of sedation to include ketamine, fentanyl and propofol. He was also loaded with 1.5g Keppra IV and started on maintenance 500mg BID.    Interval Events:      Objective   VITALS (24H):  Temperature:  [36.4 °C (97.5 °F)] 36.4 °C (97.5 °F)  Heart Rate:  [73-99] 73  Respirations:  [15-16] 15  BP: (112-164)/(76-92) 121/77  INTAKE/OUTPUT:No intake or output  data in the 24 hours ending 04/17/25 1327  VENT SETTINGS:  Vent Mode: Volume control/assist control  S RR:  [15] 15  S VT:  [450 mL] 450 mL  PEEP/CPAP (cm H2O):  [5 cm H20] 5 cm H20  MAP (cm H2O):  [9] 9     PHYSICAL EXAM:  NEURO: Examined on prop 50 and fentanyl 100  - ECNS, PERRL 3 -> 2 bilaterally,  - dysconjugate gaze with L exodeviation  - LUE spontaneous movement with sternal rub  CV:  - RRR on telemetry, NSR  RESP:  - intubated  :  -  catheter in place  GI:  - Abdomen NT/ND, soft  SKIN:  - Intact    MEDICATIONS:  Scheduled: PRN: Continuous:   enoxaparin, 40 mg, subcutaneous, q24h  levETIRAcetam, 500 mg, intravenous, q12h  oxygen, , inhalation, Continuous - Inhalation  polyethylene glycol, 17 g, oral, Daily     PRN medications: propofol fentaNYL,  mcg/hr, Last Rate: 100 mcg/hr (04/17/25 1318)  propofol, 0-50 mcg/kg/min, Last Rate: 40 mcg/kg/min (04/17/25 1318)  sodium chloride 0.9%, 50 mL/hr         LAB RESULTS:  Results from last 72 hours   Lab Units 04/17/25  0922   GLUCOSE mg/dL 83   SODIUM mmol/L 137   POTASSIUM mmol/L 3.5   CHLORIDE mmol/L 104   CO2 mmol/L 25   ANION GAP mmol/L 12   BUN mg/dL 7   CREATININE mg/dL 0.81   EGFR mL/min/1.73m*2 >90   CALCIUM mg/dL 8.7   ALBUMIN g/dL 4.3   MAGNESIUM mg/dL 1.93      Results from last 72 hours   Lab Units 04/17/25  0922   WBC AUTO x10*3/uL 6.6   NRBC AUTO /100 WBCs 0.0   RBC AUTO x10*6/uL 5.02   HEMOGLOBIN g/dL 11.7*   HEMATOCRIT % 36.3   MCV fL 72*   MCH pg 23.3*   MCHC g/dL 32.2   RDW % 14.1   PLATELETS AUTO x10*3/uL 216   NEUTROS PCT AUTO % 59.5   IG PCT AUTO % 0.5   LYMPHS PCT AUTO % 28.2   MONOS PCT AUTO % 11.0   EOS PCT AUTO % 0.0   BASOS PCT AUTO % 0.8   NEUTROS ABS x10*3/uL 3.96   IG AUTO x10*3/uL 0.03   LYMPHS ABS AUTO x10*3/uL 1.87   MONOS ABS AUTO x10*3/uL 0.73   EOS ABS AUTO x10*3/uL 0.00   BASOS ABS AUTO x10*3/uL 0.05             IMAGING RESULTS:  CT head wo IV contrast   Final Result   Unremarkable CT head.        This study was interpreted  at [facility].        MACRO:   None        Signed by: Ben Rashid 4/17/2025 10:12 AM   Dictation workstation:   [id number]      XR chest 1 view   Final Result   ET tube approximately 0.6 cm above the maty.  Consider retracting, 3   to 4 cm.   Signed by Alexey Figueroa MD             Assessment/Plan    20 y.o. 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 Was intubated in the ED due to poor mentation and minimal respiratory effort. Admitted to NSU for further management.     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  Assessment:  - Exam as above  - S/p LEV 1.5g in the ED  Plan:  - NSU  - Neuro Checks: Q1H  - Sedation: Propofol and Fentanyl  - Pain: acetaminophen PRN  - Nausea: None  - PT/OT/SLP  - cvEEG  - Continue home AEDs:              LEV 500mg BID              -150 (200-250 per med list from patient) - HELD due to undetectable LTG level  - LTG level <1 on admission  - LEV level pending  - Continue prop and fent for sedation   - Hold Sertraline 100mg daily, buproprion 450mg - Last refilled in 1/2025 for 30 days  - Hold home Adderall IR 15mg TID (10a-1-3p-6p)+ XR 30mg daily (noon)  - Hold Modafinil 200mg BID (10a. 1-3p)  - Hold home gabapentin 300mg daily    CARDIOVASCULAR:  #JAC  Assessment:  - EKG: Not available to view  Plan:  - Continue to monitor on telemetry  - BP goal: Normotension    RESPIRATORY:  #Asthma  #Acute respiratory insufficiency  Assessment:  - Intubated, vent settings as above  Plan:  - Continuous pulse oximetry   - O2 PRN to maintain SpO2 > 94%, wean as tolerated  - Incentive spirometry while awake, Ventilator bundle while  intubated, and Daily CPAP and weaning parameters while intubated  - Held home Breo Ellipta     RENAL/:  #JAC  Assessment:  - Baseline BUN/Cr: 7/0.81  Results from last 72 hours   Lab Units 25  0922   BUN mg/dL 7   CREATININE mg/dL 0.81       Plan:  - Monitor with daily RFP  - Maintain celis catheter for: critically ill patient who need accurate urinary output measurements    FEN/GI:  #JAC  Plan:  - Monitor and replace electrolytes per protocol  - IVF: NS @ 50 mL/hr  - Diet: NPO   - Bowel Regimen: Miralax PRN    ENDOCRINE:  #JAC  Assessment:  Results from last 7 days   Lab Units 25  0922   GLUCOSE mg/dL 83   SODIUM mmol/L 137      Plan:  - Accuchecks & ISS Q6H     HEMATOLOGY:  #Anemia  Assessment:  - Baseline Hgb: 11.7   - Baseline Plts: 216  Results from last 7 days   Lab Units 25  0922   HEMOGLOBIN g/dL 11.7*   HEMATOCRIT % 36.3   PLATELETS AUTO x10*3/uL 216     Plan:  - Continue to monitor with daily CBC and Coag panel  - C/w home     INFECTIOUS DISEASE:  #JAC  Assessment:  Results from last 7 days   Lab Units 25  0922   WBC AUTO x10*3/uL 6.6    - Temp (24hrs), Av.4 °C (97.5 °F), Min:36.4 °C (97.5 °F), Max:36.4 °C (97.5 °F)     Plan:  - Continue to monitor for s/sx of infection  - Pan culture for temperature > 38.4 C    MUSCULOSKELETAL:  - No acute issues    SKIN:  - No acute issues  - Turns and skin care per NSU protocol    ACCESS:  - PIV x 2    PROPHYLAXIS:  - DVT Ppx: SCDs and SQ Enoxaparin  - GI Ppx: None    RESTRAINTS:  Not indicated/Patient does not meet criteria for restraints    Aby Daily MD  Neuroscience Intensive Care

## 2025-04-17 NOTE — ED PROCEDURE NOTE
Procedure  Intubation    Performed by: Kirit Horton DO  Authorized by: Kirit Horton DO    Consent:     Consent obtained:  Emergent situation  Universal protocol:     Imaging studies available: yes      Required blood products, implants, devices, and special equipment available: yes      Patient identity confirmed:  Anonymous protocol, patient vented/unresponsive and arm band  Pre-procedure details:     Indications: airway protection and altered consciousness      Patient status:  Unresponsive    Look externally: no concerns      Mouth opening - incisor distance:  3 or more finger widths    Hyoid-mental distance: 3 or more finger widths      Hyoid-thyroid distance: 2 or more finger widths      Mallampati score:  I    Obstruction: none      Neck mobility: normal      Pharmacologic strategy: RSI      Induction agents:  Propofol    Paralytics:  Succinylcholine  Procedure details:     Preoxygenation:  Bag valve mask    CPR in progress: no      Number of attempts:  1  Successful intubation attempt details:     Intubation method:  Oral    Intubation technique: video assisted      Laryngoscope blade:  Mac 4    Bougie used: no      Grade view: II      Tube size (mm):  7.0    Tube type:  Cuffed    Tube visualized through cords: yes    Placement assessment:     ETT at teeth/gumline (cm):  25    Tube secured with:  ETT serra    Breath sounds:  Equal    Placement verification: chest rise, CXR verification, direct visualization and waveform ETCO2      CXR findings:  Low    Tube repositioned: no    Post-procedure details:     Procedure completion:  Tolerated well, no immediate complications               Kirit Horton DO  Resident  04/17/25 0946

## 2025-04-17 NOTE — ED PROCEDURE NOTE
Procedure  Critical Care    Performed by: Marlo Marti MD  Authorized by: Marlo Marti MD    Critical care provider statement:     Critical care time (minutes):  36    Critical care time was exclusive of:  Separately billable procedures and treating other patients and teaching time    Critical care was necessary to treat or prevent imminent or life-threatening deterioration of the following conditions:  CNS failure or compromise    Critical care was time spent personally by me on the following activities:  Development of treatment plan with patient or surrogate, discussions with consultants, evaluation of patient's response to treatment, examination of patient, obtaining history from patient or surrogate, ordering and performing treatments and interventions, ordering and review of laboratory studies, ordering and review of radiographic studies, pulse oximetry, re-evaluation of patient's condition and review of old charts               Marlo Marti MD  04/17/25 1011

## 2025-04-17 NOTE — H&P
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     Per friend at bedside, patient had an “event” of shaking on 4/16 evening time. He was found shaking all over, with stiffening then shaking. There was L head deviation and L face clonic activity. There was foaming at the mouth. The shaking was going on, on and off, for approximately 5-10min. The patient was given 2 doses of home intranasal midazolam and episode stopped and patient slept through the night. This morning, 4/17, patient was found with LUE extension/stiffening, following by full body stiffening and then shaking. There was L head deviation, foaming at the mouth. Eyes alternated from open to closed. There was no bowel/bladder incontinence and no tongue biting. There was no more intranasal midazolam available to give. Friend called EMS and by the time they arrived, shaking stopped. Afterwards, there was gasping for air and deep breathing.      Brought to ER and intubated d/t poor mentation and minimal spontaneous respiratory effort. On arrival there was no withdraw to nox stim and patient was not responding to any commands or sternal rub. Per ER, no witnessed seizure-like activity since arrived. After intubation, patient became agitated and required uptitration of sedation to include ketamine, fentanyl and propofol. He was also given 1.5g Keppra IV.     Prior Neurologic History:     He was recently admitted to [facility] (2/6-2/10/25) for breakthrough seizures. Per report, “Semiology concerning for figure four posture localizing to R focal onset (consistent with pt reported prior hx) progressing to GTC with tongue bite on exam,  although unusual recent MRI reported L hippocampal lesion instead. Breakthrough in setting of reported non compliance and undetectable LEV and LTG level.” Patient loaded with LEV and remained somnolent/not following commands for a couple days. He was forcing eyes closed when examiner attempted to open eyes, intermittently following commands, and withdrawing to nox stim. Pt with significant concern regarding ASM contributing to daytime fatigue. Agreed to cut back on LEV to 500 BID, stop gabapentin and increase LTG to 100/150.     Admitted to neurology at  from 1/21-1/23/25 for breakthrough seizures. Patient presented with right-sided clonic activity not responsive to lamotrigine at increased dose of 100 mg BID and concurrent levetiracetam at 750 mg BID. Levetiracetam and lamotrigine values on admission 21 and 3.1 Patient initially declined admission for further monitoring but then refused discharge d/t hip pain. Was eventually discharged home with plans for epilepsy follow and EMU admission in 5/2025. Was discharged with LEV 1000mg BID and lamotrigine titration up to 100mg BID.      Also admitted 1/16/25 with L sided clonic activity. MRI brain w/o contrast showed no right hemisphere. Per Radiology report, slightly thickened left hippocampal body and tail with T2/FLAIR hyperintensity. EEG on 1/17/2025 showed no epileptiform activity.      Prior neurologic hx per 11/2024 outpatient neuro note:   - Patient was first seen in neurology clinic on 1/31/24. At that time, they endorsed both sleepiness (main reason for consult) and paroxysmal events. Regarding sleepiness, this was described as longstanding since starting college-level classes in high school and gradually progressed. Now manifesting as persistent daytime sleepiness with at least once-daily sleep attacks. Often has dreams initially with the LOC, even if they are awoken in several minutes by friends. No other stigmata of narcolepsy and endorsed poor sleep  "hygiene. Regarding paroxysmal events, patient endorsed episodes of foul smell followed by LOC and diffuse motor actions since childhood.   - Overall diagnoses were consistent with Insufficient Sleep Syndrome with REM Intrusions (DDx narcolepsy) and unspecified paroxysmal events. Plan was to obtain MSLT, establish with sleep med (given no sleep attendings at  neuro clinic currently), and continue home  &  daily. Deferred possibility of LP for orexin levels given low-to-moderate narcolepsy concern, making MSLT the appropriate first step.     ANAMNESIS PER PATIENT   A day before, the patient will notice a little \"weird\" feeling. During the event, there will be a burning smell. Then loses consciousness and wakes up on the floor with all muscles hurting. Has had minor injuries. Usually has urinary incontinence and has once or twice awakened with blood in the mouth, but does not have stimata of tongue biting.     Epilepsy Risk Factors              Term birth - Yes              History of  hypoxia - no              History of birth complications - no              History of abnormal development or intellectual disability - no              History of CNS infection (meningitis/encephalitis) - no               History of febrile seizures - no              History of moderate/severe head trauma - several mild concussions              History of stroke/ICH - no              Family history of seizures - no              History of drug / alcohol use - maybe occasional marijuana??              History of neurosurgical procedure - no     Anti-Epileptic Drug (AED) History              Compliant: not always, 2025 admission with subtherapeutic levels             Current AEDs: LEV 500mg BID, -150             Current medication side effects: fatigue              AEDs previously used and reasons for discontinuation: none     Quality of Life:              Driving: none              Working: Case Student- " "[employer]              Living with: self - studio apartment.     Past Medical History  Medical History[1]  Surgical History  Surgical History[2]  Social History  Social History[3]  Allergies  Patient has no known allergies.  Prescriptions Prior to Admission[4]    Neurological Exam  Physical Exam    Neurological Exam (on prop 50 and fent 100 d/t agitation)  General appearance: intubated and ventilated, on sedation as above    Not arousing to nox stim, not following any commands  Eyes closed to nox stim  Pupils 3->2mm bilaterally  Eyes midline  Face appears symmetric    Tone and bulk normal in all extremities  No fasciculations, tremor or other abnormal movements were present.   Not withdrawing to nox stim in all 4 extremities     REFLEXES:  R  L  Biceps   1  1  Brachioradialis  1  1  Patellar   1  1  Achilles 1  1  Plantar  Down Down    Last Recorded Vitals  Blood pressure 118/80, pulse 80, temperature 36.4 °C (97.5 °F), resp. rate 15, height 1.727 m (5' 8\"), weight 77.1 kg (170 lb), SpO2 99%.    Relevant Results                    [place] Coma Scale  Best Eye Response: None  Best Verbal Response: None  Best Motor Response: None  [place] Coma Scale Score: 3                 I have personally reviewed the following imaging results:  Imaging  CT head wo IV contrast  Result Date: 4/17/2025  Unremarkable CT head.   This study was interpreted at [facility].   MACRO: None   Signed by: Ben Rashid 4/17/2025 10:12 AM Dictation workstation:   [id number]    XR chest 1 view  Result Date: 4/17/2025  ET tube approximately 0.6 cm above the maty.  Consider retracting, 3 to 4 cm. 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 He was witnessed to have seizure-like activity on 4/16 PM which stopped with home rescue med, but had additional seizure-like activity on 4/17 AM which lasted for 15min, was brought to ER, intubated for airway protection. Friend describes seizure as LUE stiffening, followed by full body stiffening then shaking all over, with L head turn and L face twitching. There was foaming at the mouth and post-ictal coma. No B/B incontinence or tongue biting. Admitting to NSU for cvEEG and further monitoring.    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  [2] No past surgical history on file.  [3]    [4] (Not in a hospital admission)

## 2025-04-18 ENCOUNTER — APPOINTMENT (OUTPATIENT)
Dept: NEUROLOGY | Facility: HOSPITAL | Age: 20
DRG: 883 | End: 2025-04-18
Payer: COMMERCIAL

## 2025-04-18 ENCOUNTER — APPOINTMENT (OUTPATIENT)
Dept: CARDIOLOGY | Facility: HOSPITAL | Age: 20
DRG: 883 | End: 2025-04-18
Payer: COMMERCIAL

## 2025-04-18 PROBLEM — R56.9 SEIZURE (MULTI): Chronic | Status: ACTIVE | Noted: 2025-04-17

## 2025-04-18 LAB
ALBUMIN SERPL BCP-MCNC: 3.8 G/DL (ref 3.4–5)
ALBUMIN SERPL BCP-MCNC: 3.8 G/DL (ref 3.4–5)
ANION GAP SERPL CALC-SCNC: 12 MMOL/L (ref 10–20)
ANION GAP SERPL CALC-SCNC: 12 MMOL/L (ref 10–20)
APTT PPP: 32 SECONDS (ref 26–36)
APTT PPP: 32 SECONDS (ref 26–36)
ATRIAL RATE: 76 BPM
BUN SERPL-MCNC: 7 MG/DL (ref 6–23)
BUN SERPL-MCNC: 7 MG/DL (ref 6–23)
CALCIUM SERPL-MCNC: 8.5 MG/DL (ref 8.6–10.6)
CALCIUM SERPL-MCNC: 8.5 MG/DL (ref 8.6–10.6)
CARDIAC TROPONIN I PNL SERPL HS: <3 NG/L (ref 0–53)
CARDIAC TROPONIN I PNL SERPL HS: <3 NG/L (ref 0–53)
CHLORIDE SERPL-SCNC: 107 MMOL/L (ref 98–107)
CHLORIDE SERPL-SCNC: 107 MMOL/L (ref 98–107)
CO2 SERPL-SCNC: 21 MMOL/L (ref 21–32)
CO2 SERPL-SCNC: 21 MMOL/L (ref 21–32)
CREAT SERPL-MCNC: 0.61 MG/DL (ref 0.5–1.3)
CREAT SERPL-MCNC: 0.61 MG/DL (ref 0.5–1.3)
EGFRCR SERPLBLD CKD-EPI 2021: >90 ML/MIN/1.73M*2
EGFRCR SERPLBLD CKD-EPI 2021: >90 ML/MIN/1.73M*2
ERYTHROCYTE [DISTWIDTH] IN BLOOD BY AUTOMATED COUNT: 14.2 % (ref 11.5–14.5)
ERYTHROCYTE [DISTWIDTH] IN BLOOD BY AUTOMATED COUNT: 14.2 % (ref 11.5–14.5)
GLUCOSE SERPL-MCNC: 88 MG/DL (ref 74–99)
GLUCOSE SERPL-MCNC: 88 MG/DL (ref 74–99)
HCT VFR BLD AUTO: 32.6 % (ref 36–52)
HCT VFR BLD AUTO: 32.6 % (ref 36–52)
HGB BLD-MCNC: 10.1 G/DL (ref 12–17.5)
HGB BLD-MCNC: 10.1 G/DL (ref 12–17.5)
INR PPP: 1.1 (ref 0.9–1.1)
INR PPP: 1.1 (ref 0.9–1.1)
MAGNESIUM SERPL-MCNC: 1.75 MG/DL (ref 1.6–2.4)
MAGNESIUM SERPL-MCNC: 1.75 MG/DL (ref 1.6–2.4)
MCH RBC QN AUTO: 22.9 PG (ref 26–34)
MCH RBC QN AUTO: 22.9 PG (ref 26–34)
MCHC RBC AUTO-ENTMCNC: 31 G/DL (ref 32–36)
MCHC RBC AUTO-ENTMCNC: 31 G/DL (ref 32–36)
MCV RBC AUTO: 74 FL (ref 80–100)
MCV RBC AUTO: 74 FL (ref 80–100)
NRBC BLD-RTO: 0 /100 WBCS (ref 0–0)
NRBC BLD-RTO: 0 /100 WBCS (ref 0–0)
P AXIS: 65 DEGREES
P OFFSET: 197 MS
P ONSET: 143 MS
PHOSPHATE SERPL-MCNC: 2.6 MG/DL (ref 2.5–4.9)
PHOSPHATE SERPL-MCNC: 2.6 MG/DL (ref 2.5–4.9)
PLATELET # BLD AUTO: 173 X10*3/UL (ref 150–450)
PLATELET # BLD AUTO: 173 X10*3/UL (ref 150–450)
POTASSIUM SERPL-SCNC: 3.4 MMOL/L (ref 3.5–5.3)
POTASSIUM SERPL-SCNC: 3.4 MMOL/L (ref 3.5–5.3)
PR INTERVAL: 146 MS
PROTHROMBIN TIME: 12.6 SECONDS (ref 9.8–12.4)
PROTHROMBIN TIME: 12.6 SECONDS (ref 9.8–12.4)
Q ONSET: 216 MS
QRS COUNT: 12 BEATS
QRS DURATION: 84 MS
QT INTERVAL: 408 MS
QTC CALCULATION(BAZETT): 459 MS
QTC FREDERICIA: 441 MS
R AXIS: 64 DEGREES
RBC # BLD AUTO: 4.41 X10*6/UL (ref 4–5.9)
RBC # BLD AUTO: 4.41 X10*6/UL (ref 4–5.9)
SODIUM SERPL-SCNC: 137 MMOL/L (ref 136–145)
SODIUM SERPL-SCNC: 137 MMOL/L (ref 136–145)
T AXIS: 55 DEGREES
T OFFSET: 420 MS
VENTRICULAR RATE: 76 BPM
WBC # BLD AUTO: 9.4 X10*3/UL (ref 4.4–11.3)
WBC # BLD AUTO: 9.4 X10*3/UL (ref 4.4–11.3)

## 2025-04-18 PROCEDURE — 85610 PROTHROMBIN TIME: CPT

## 2025-04-18 PROCEDURE — 36415 COLL VENOUS BLD VENIPUNCTURE: CPT

## 2025-04-18 PROCEDURE — 85027 COMPLETE CBC AUTOMATED: CPT

## 2025-04-18 PROCEDURE — 2500000004 HC RX 250 GENERAL PHARMACY W/ HCPCS (ALT 636 FOR OP/ED)

## 2025-04-18 PROCEDURE — 83735 ASSAY OF MAGNESIUM: CPT

## 2025-04-18 PROCEDURE — 2500000001 HC RX 250 WO HCPCS SELF ADMINISTERED DRUGS (ALT 637 FOR MEDICARE OP)

## 2025-04-18 PROCEDURE — 80069 RENAL FUNCTION PANEL: CPT

## 2025-04-18 PROCEDURE — 84484 ASSAY OF TROPONIN QUANT: CPT

## 2025-04-18 PROCEDURE — 94003 VENT MGMT INPAT SUBQ DAY: CPT

## 2025-04-18 PROCEDURE — 2500000002 HC RX 250 W HCPCS SELF ADMINISTERED DRUGS (ALT 637 FOR MEDICARE OP, ALT 636 FOR OP/ED): Performed by: REGISTERED NURSE

## 2025-04-18 PROCEDURE — C9254 INJECTION, LACOSAMIDE: HCPCS

## 2025-04-18 PROCEDURE — 93005 ELECTROCARDIOGRAM TRACING: CPT

## 2025-04-18 PROCEDURE — 2500000004 HC RX 250 GENERAL PHARMACY W/ HCPCS (ALT 636 FOR OP/ED): Mod: JZ | Performed by: REGISTERED NURSE

## 2025-04-18 PROCEDURE — 95720 EEG PHY/QHP EA INCR W/VEEG: CPT | Performed by: PSYCHIATRY & NEUROLOGY

## 2025-04-18 PROCEDURE — 1100000001 HC PRIVATE ROOM DAILY

## 2025-04-18 PROCEDURE — 99232 SBSQ HOSP IP/OBS MODERATE 35: CPT | Performed by: PSYCHIATRY & NEUROLOGY

## 2025-04-18 PROCEDURE — 94640 AIRWAY INHALATION TREATMENT: CPT

## 2025-04-18 PROCEDURE — 2500000004 HC RX 250 GENERAL PHARMACY W/ HCPCS (ALT 636 FOR OP/ED): Mod: JZ

## 2025-04-18 PROCEDURE — 2500000005 HC RX 250 GENERAL PHARMACY W/O HCPCS: Performed by: STUDENT IN AN ORGANIZED HEALTH CARE EDUCATION/TRAINING PROGRAM

## 2025-04-18 PROCEDURE — 99291 CRITICAL CARE FIRST HOUR: CPT

## 2025-04-18 PROCEDURE — 95700 EEG CONT REC W/VID EEG TECH: CPT

## 2025-04-18 PROCEDURE — 2500000002 HC RX 250 W HCPCS SELF ADMINISTERED DRUGS (ALT 637 FOR MEDICARE OP, ALT 636 FOR OP/ED)

## 2025-04-18 PROCEDURE — 2500000005 HC RX 250 GENERAL PHARMACY W/O HCPCS

## 2025-04-18 PROCEDURE — 95716 VEEG EA 12-26HR CONT MNTR: CPT

## 2025-04-18 PROCEDURE — 93010 ELECTROCARDIOGRAM REPORT: CPT | Performed by: INTERNAL MEDICINE

## 2025-04-18 RX ORDER — IBUPROFEN 400 MG/1
800 TABLET ORAL EVERY 6 HOURS PRN
Status: DISCONTINUED | OUTPATIENT
Start: 2025-04-18 | End: 2025-04-21 | Stop reason: HOSPADM

## 2025-04-18 RX ORDER — LACOSAMIDE 100 MG/1
100 TABLET ORAL 2 TIMES DAILY
Status: DISCONTINUED | OUTPATIENT
Start: 2025-04-18 | End: 2025-04-18

## 2025-04-18 RX ORDER — MAGNESIUM SULFATE HEPTAHYDRATE 40 MG/ML
2 INJECTION, SOLUTION INTRAVENOUS EVERY 6 HOURS PRN
Status: DISCONTINUED | OUTPATIENT
Start: 2025-04-18 | End: 2025-04-19

## 2025-04-18 RX ORDER — GABAPENTIN 300 MG/1
300 CAPSULE ORAL DAILY
Status: DISCONTINUED | OUTPATIENT
Start: 2025-04-18 | End: 2025-04-21 | Stop reason: HOSPADM

## 2025-04-18 RX ORDER — POTASSIUM CHLORIDE 14.9 MG/ML
20 INJECTION INTRAVENOUS
Status: DISCONTINUED | OUTPATIENT
Start: 2025-04-18 | End: 2025-04-18

## 2025-04-18 RX ORDER — SODIUM CHLORIDE FOR INHALATION 3 %
3 VIAL, NEBULIZER (ML) INHALATION EVERY 6 HOURS SCHEDULED
Status: DISCONTINUED | OUTPATIENT
Start: 2025-04-18 | End: 2025-04-20

## 2025-04-18 RX ORDER — BENZONATATE 100 MG/1
100 CAPSULE ORAL 3 TIMES DAILY PRN
Status: DISCONTINUED | OUTPATIENT
Start: 2025-04-18 | End: 2025-04-21 | Stop reason: HOSPADM

## 2025-04-18 RX ORDER — SERTRALINE HYDROCHLORIDE 50 MG/1
50 TABLET, FILM COATED ORAL DAILY
Status: DISCONTINUED | OUTPATIENT
Start: 2025-04-18 | End: 2025-04-20

## 2025-04-18 RX ORDER — CALCIUM GLUCONATE 20 MG/ML
1 INJECTION, SOLUTION INTRAVENOUS EVERY 6 HOURS PRN
Status: DISCONTINUED | OUTPATIENT
Start: 2025-04-18 | End: 2025-04-19

## 2025-04-18 RX ORDER — BUPROPION HYDROCHLORIDE 150 MG/1
450 TABLET ORAL DAILY
Status: DISCONTINUED | OUTPATIENT
Start: 2025-04-19 | End: 2025-04-21 | Stop reason: HOSPADM

## 2025-04-18 RX ORDER — MODAFINIL 100 MG/1
200 TABLET ORAL 2 TIMES DAILY
Status: DISCONTINUED | OUTPATIENT
Start: 2025-04-18 | End: 2025-04-21 | Stop reason: HOSPADM

## 2025-04-18 RX ORDER — MAGNESIUM SULFATE HEPTAHYDRATE 40 MG/ML
4 INJECTION, SOLUTION INTRAVENOUS EVERY 6 HOURS PRN
Status: DISCONTINUED | OUTPATIENT
Start: 2025-04-18 | End: 2025-04-19

## 2025-04-18 RX ORDER — CALCIUM GLUCONATE 20 MG/ML
2 INJECTION, SOLUTION INTRAVENOUS EVERY 6 HOURS PRN
Status: DISCONTINUED | OUTPATIENT
Start: 2025-04-18 | End: 2025-04-19

## 2025-04-18 RX ORDER — POTASSIUM CHLORIDE 14.9 MG/ML
20 INJECTION INTRAVENOUS ONCE
Status: DISCONTINUED | OUTPATIENT
Start: 2025-04-18 | End: 2025-04-19

## 2025-04-18 RX ORDER — POTASSIUM CHLORIDE 14.9 MG/ML
20 INJECTION INTRAVENOUS EVERY 6 HOURS PRN
Status: DISCONTINUED | OUTPATIENT
Start: 2025-04-18 | End: 2025-04-19

## 2025-04-18 RX ORDER — MAGNESIUM SULFATE HEPTAHYDRATE 40 MG/ML
2 INJECTION, SOLUTION INTRAVENOUS ONCE
Status: COMPLETED | OUTPATIENT
Start: 2025-04-18 | End: 2025-04-18

## 2025-04-18 RX ADMIN — DEXMEDETOMIDINE HYDROCHLORIDE 0.7 MCG/KG/HR: 400 INJECTION INTRAVENOUS at 02:56

## 2025-04-18 RX ADMIN — ALBUTEROL SULFATE 2.5 MG: 2.5 SOLUTION RESPIRATORY (INHALATION) at 11:39

## 2025-04-18 RX ADMIN — IBUPROFEN 800 MG: 400 TABLET ORAL at 20:40

## 2025-04-18 RX ADMIN — LEVETIRACETAM 500 MG: 5 INJECTION INTRAVENOUS at 09:30

## 2025-04-18 RX ADMIN — SODIUM CHLORIDE SOLN NEBU 3% 3 ML: 3 NEBU SOLN at 18:27

## 2025-04-18 RX ADMIN — POTASSIUM CHLORIDE 20 MEQ: 14.9 INJECTION, SOLUTION INTRAVENOUS at 05:34

## 2025-04-18 RX ADMIN — Medication 30 PERCENT: at 08:00

## 2025-04-18 RX ADMIN — LEVETIRACETAM 250 MG: 500 INJECTION, SOLUTION INTRAVENOUS at 23:09

## 2025-04-18 RX ADMIN — MAGNESIUM SULFATE HEPTAHYDRATE 2 G: 40 INJECTION, SOLUTION INTRAVENOUS at 06:55

## 2025-04-18 RX ADMIN — ENOXAPARIN SODIUM 40 MG: 100 INJECTION SUBCUTANEOUS at 21:24

## 2025-04-18 RX ADMIN — ALBUTEROL SULFATE 2.5 MG: 2.5 SOLUTION RESPIRATORY (INHALATION) at 18:29

## 2025-04-18 RX ADMIN — LACOSAMIDE 100 MG: 10 INJECTION INTRAVENOUS at 21:48

## 2025-04-18 RX ADMIN — ACETAMINOPHEN 650 MG: 325 TABLET, FILM COATED ORAL at 20:39

## 2025-04-18 RX ADMIN — Medication 4 L/MIN: at 20:46

## 2025-04-18 ASSESSMENT — COGNITIVE AND FUNCTIONAL STATUS - GENERAL
STANDING UP FROM CHAIR USING ARMS: A LITTLE
MOBILITY SCORE: 18
WALKING IN HOSPITAL ROOM: A LITTLE
MOVING FROM LYING ON BACK TO SITTING ON SIDE OF FLAT BED WITH BEDRAILS: A LITTLE
TURNING FROM BACK TO SIDE WHILE IN FLAT BAD: A LITTLE
CLIMB 3 TO 5 STEPS WITH RAILING: A LITTLE
MOVING TO AND FROM BED TO CHAIR: A LITTLE

## 2025-04-18 ASSESSMENT — PAIN - FUNCTIONAL ASSESSMENT: PAIN_FUNCTIONAL_ASSESSMENT: 0-10

## 2025-04-18 ASSESSMENT — PAIN SCALES - GENERAL: PAINLEVEL_OUTOF10: 5 - MODERATE PAIN

## 2025-04-18 NOTE — PROGRESS NOTES
[facility]  NEUROSCIENCE INTENSIVE CARE UNIT  DAILY PROGRESS NOTE       Patient Name: Lisa Ramirez   MRN: [id number]     Admit Date: 2025     : 2005 AGE: 20 y.o. 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 Per friend at bedside, patient had an “event” of shaking on  evening time. He was found shaking all over, with stiffening then shaking. There was L head deviation and L face clonic activity. There was foaming at the mouth. The shaking was going on, on and off, for approximately 5-10min. The patient was given 2 doses of home intranasal midazolam and episode stopped and patient slept through the night. This morning, , patient was found with LUE extension/stiffening, following by full body stiffening and then shaking. There was L head deviation, foaming at the mouth. Eyes alternated from open to closed. There was no bowel/bladder incontinence and no tongue biting. Brought to ER and intubated d/t poor mentation and minimal spontaneous respiratory effort. On arrival there was no withdraw to nox stim and patient was not responding to any commands or sternal rub. Per ER, no witnessed seizure-like activity since arrived. After intubation, patient became agitated and required uptitration of sedation to include ketamine, fentanyl and propofol. He was also loaded with 1.5g Keppra IV and started on maintenance 500mg BID.    Interval Events:   Overnight extubation was attempted but patient apneic during trial of CPAP.  This morning, he is complaining of chest discomfort described as achy and over the whole chest. EKG and troponin  ordered. CPAP trial initiated this morning. Unable to assess parameters due to participation.    Objective   VITALS (24H):  Temp:  [36.3 °C (97.3 °F)-36.5 °C (97.7 °F)] 36.5 °C (97.7 °F)  Heart Rate:  [] 70  Resp:  [11-20] 14  BP: (108-164)/(60-92) 122/65  FiO2 (%):  [30 %] 30 %  INTAKE/OUTPUT:  Intake/Output Summary (Last 24 hours) at 4/18/2025 0845  Last data filed at 4/18/2025 0700  Gross per 24 hour   Intake 517.42 ml   Output 1050 ml   Net -532.58 ml     VENT SETTINGS:  Vent Mode: Volume control/assist control  FiO2 (%):  [30 %] 30 %  S RR:  [15-16] 16  S VT:  [450 mL] 450 mL  PEEP/CPAP (cm H2O):  [5 cm H20] 5 cm H20  MAP (cm H2O):  [9-10] 9     PHYSICAL EXAM:  NEURO: Examined on 20 precedex  - Alert and writing on paper  - ECNS, PERRL 3 -> 2 bilaterally, dysconjugate gaze with L exodeviation, EOMI  - b/l UE and LE spontaneous and antigravity  CV:  - RRR on telemetry, NSR  RESP:  - intubated  :  - catheter in place  GI:  - Abdomen NT/ND, soft  SKIN:  - Intact    MEDICATIONS:  Scheduled: PRN: Continuous:   albuterol, , ,   [Held by provider] amphetamine-dextroamphetamine, 15 mg, oral, TID  [Held by provider] amphetamine-dextroamphetamine XR, 30 mg, oral, q24h  enoxaparin, 40 mg, subcutaneous, q24h  [Held by provider] ferrous sulfate, 325 mg, oral, BID  [Held by provider] fluticasone furoate-vilanteroL, 1 puff, inhalation, Daily  levETIRAcetam, 500 mg, intravenous, q12h  magnesium sulfate, 2 g, intravenous, Once  [Held by provider] norethindrone, 1 tablet, oral, Daily  oxygen, , inhalation, Continuous - Inhalation  polyethylene glycol, 17 g, oral, Daily  potassium chloride, 20 mEq, intravenous, Once     PRN medications: acetaminophen **OR** acetaminophen, albuterol, [Held by provider] albuterol, albuterol, calcium gluconate, calcium gluconate, fentaNYL, magnesium sulfate, magnesium sulfate, potassium chloride, propofol dexmedeTOMIDine, 0.1-1.5 mcg/kg/hr, Last Rate: 1 mcg/kg/hr (04/18/25 0700)  fentaNYL,   mcg/hr, Last Rate: Stopped (04/17/25 2227)  propofol, 0-50 mcg/kg/min, Last Rate: Stopped (04/17/25 2212)  sodium chloride 0.9%, 75 mL/hr, Last Rate: 75 mL/hr (04/18/25 0211)         LAB RESULTS:  Results from last 72 hours   Lab Units 04/18/25  0200 04/17/25  1354   GLUCOSE mg/dL 88 86   SODIUM mmol/L 137 138   POTASSIUM mmol/L 3.4* 4.1   CHLORIDE mmol/L 107 106   CO2 mmol/L 21 25   ANION GAP mmol/L 12 11   BUN mg/dL 7 7   CREATININE mg/dL 0.61 0.74   EGFR mL/min/1.73m*2 >90 >90   CALCIUM mg/dL 8.5* 8.5*   PHOSPHORUS mg/dL 2.6 2.6   ALBUMIN g/dL 3.8 3.9   MAGNESIUM mg/dL 1.75 1.88      Results from last 72 hours   Lab Units 04/18/25  0200 04/17/25  1354 04/17/25  0922   WBC AUTO x10*3/uL 9.4 9.2 6.6   NRBC AUTO /100 WBCs 0.0 0.0 0.0   RBC AUTO x10*6/uL 4.41 4.63 5.02   HEMOGLOBIN g/dL 10.1* 10.8* 11.7*   HEMATOCRIT % 32.6* 34.2* 36.3   MCV fL 74* 74* 72*   MCH pg 22.9* 23.3* 23.3*   MCHC g/dL 31.0* 31.6* 32.2   RDW % 14.2 14.1 14.1   PLATELETS AUTO x10*3/uL 173 224 216   NEUTROS PCT AUTO %  --   --  59.5   IG PCT AUTO %  --   --  0.5   LYMPHS PCT AUTO %  --   --  28.2   MONOS PCT AUTO %  --   --  11.0   EOS PCT AUTO %  --   --  0.0   BASOS PCT AUTO %  --   --  0.8   NEUTROS ABS x10*3/uL  --   --  3.96   IG AUTO x10*3/uL  --   --  0.03   LYMPHS ABS AUTO x10*3/uL  --   --  1.87   MONOS ABS AUTO x10*3/uL  --   --  0.73   EOS ABS AUTO x10*3/uL  --   --  0.00   BASOS ABS AUTO x10*3/uL  --   --  0.05             IMAGING RESULTS:  XR chest 1 view   Final Result   1.  Endotracheal tube with distal tip 3.0 cm above the maty.   2.  Nasogastric tube with distal tip in the stomach.   Signed by Frank Parekh MD      CT head wo IV contrast   Final Result   Unremarkable CT head.        This study was interpreted at [facility].        MACRO:   None        Signed by: Ben Rashid 4/17/2025 10:12 AM   Dictation workstation:   [id number]      XR chest 1 view   Final Result   ET tube  approximately 0.6 cm above the maty.  Consider retracting, 3   to 4 cm.   Signed by Alexey Figueroa MD           Assessment/Plan    20 y.o. 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 Was intubated in the ED due to poor mentation and minimal respiratory effort. Admitted to NSU for further management.     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  Assessment:  - Exam as above  - S/p LEV 1.5g in the ED  - LTG level <1 on admission  - Positive for amphetamine, benzo and fentanyl on admission  Plan:  - NSU  - Neuro Checks: Q1H  - Sedation: Propofol and Fentanyl  - Pain: acetaminophen PRN  - Nausea: None  - PT/OT/SLP  - cvEEG  - Continue home AEDs:              LEV 500mg BID              -150 (200-250 per med list from patient) - HELD due to undetectable LTG level  - LEV level pending  - Continue prop and fent for sedation - wean as tolerated  - Hold Sertraline 100mg daily, buproprion 450mg - Last refilled in 1/2025 for 30 days  - Hold home Adderall IR 15mg TID (10a-1-3p-6p)+ XR 30mg daily (noon)  - Hold Modafinil 200mg BID (10a. 1-3p)  - Hold home gabapentin 300mg daily    CARDIOVASCULAR:  #JAC  Assessment:  - EKG: Not available to view  Plan:  - Continue to monitor on telemetry  - BP goal: Normotension    RESPIRATORY:  #Asthma  #Acute respiratory insufficiency  Assessment:  - Intubated, vent settings as above  Plan:  - Continuous pulse oximetry   - O2 PRN to maintain SpO2 > 94%, wean as tolerated  - Incentive spirometry while awake, Ventilator bundle while intubated, and Daily CPAP and weaning parameters while intubated  - Held home Breo Ellipta    RENAL/:  #JAC  Assessment:  -  Baseline BUN/Cr: 7/0.81  Results from last 72 hours   Lab Units 25  0200 25  1354   BUN mg/dL 7 7   CREATININE mg/dL 0.61 0.74       Plan:  - Monitor with daily RFP  - Maintain celis catheter for: critically ill patient who need accurate urinary output measurements    FEN/GI:  #JAC  Plan:  - Monitor and replace electrolytes per protocol  - IVF: NS @ 75 mL/hr  - Diet: NPO   - Bowel Regimen: Miralax PRN    ENDOCRINE:  #JAC  Assessment:  Results from last 7 days   Lab Units 25  0200 25  1354 25  0922   GLUCOSE mg/dL 88 86 83   SODIUM mmol/L 137 138 137      Plan:  - Accuchecks & ISS Q6H     HEMATOLOGY:  #Anemia  Assessment:  - Baseline Hgb: 11.7   - Baseline Plts: 216  Results from last 7 days   Lab Units 25  0200 25  1354 25  0922   HEMOGLOBIN g/dL 10.1* 10.8* 11.7*   HEMATOCRIT % 32.6* 34.2* 36.3   PLATELETS AUTO x10*3/uL 173 224 216     Plan:  - Continue to monitor with daily CBC and Coag panel  - C/w home     INFECTIOUS DISEASE:  #JAC  Assessment:  Results from last 7 days   Lab Units 25  0200 25  1354 25  0922   WBC AUTO x10*3/uL 9.4 9.2 6.6    - Temp (24hrs), Av.4 °C (97.5 °F), Min:36.3 °C (97.3 °F), Max:36.5 °C (97.7 °F)     Plan:  - Continue to monitor for s/sx of infection  - Pan culture for temperature > 38.4 C    MUSCULOSKELETAL:  - No acute issues    SKIN:  - No acute issues  - Turns and skin care per NSU protocol    ACCESS:  - PIV x 2    PROPHYLAXIS:  - DVT Ppx: SCDs and SQ Enoxaparin  - GI Ppx: None    RESTRAINTS:  Not indicated/Patient does not meet criteria for restraints    Uriel Hogan MD  PGY2 Neurology  Neuroscience Intensive Care

## 2025-04-18 NOTE — PROGRESS NOTES
Occupational Therapy                 Therapy Communication Note    Patient Name: Lisa Ramirez  MRN: [id number]  Department: [facility] [id number] NEURODG  Room: 02/02-A  Today's Date: 4/18/2025     Discipline: Occupational Therapy    OT Missed Visit: Yes     Missed Visit Reason: Missed Visit Reason:  (Pt PT, who communicated with mother; pt sleeping soundly and mother requesting hold off on therapy at this time to allow pt to rest.)    Missed Time: Attempt 1046      Comment:
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
## 2025-04-18 NOTE — PROGRESS NOTES
"Lisa Ramirez is a 20 y.o. adult on day 1 of admission presenting with Seizure (Multi).      Subjective   Seen this AM, intubated on Precedex. Appears comfortable.    On rounds (see significant event note from Uriel Hogan MD) pt and family notably upset about the care they are receiving and reportedly requesting transfer to different hospital, ultimately police were called to mitigate the situation.    Objective     Last Recorded Vitals  Blood pressure 121/61, pulse 77, temperature 36.5 °C (97.7 °F), temperature source Temporal, resp. rate 17, height 1.727 m (5' 8\"), weight 84.8 kg (186 lb 15.2 oz), SpO2 100%.    Physical Exam  Neurological Exam  EOVS, follows 1 step commands, able to write on paper  PERRL, Tracks bilaterally, face symmetric  Moves all 4 extremities antigravity spontaneously and on command  SILTx4 (grimaces to nox stim)    Relevant Results    Scheduled medications  albuterol, , ,   [Held by provider] amphetamine-dextroamphetamine, 15 mg, oral, TID  [Held by provider] amphetamine-dextroamphetamine XR, 30 mg, oral, q24h  enoxaparin, 40 mg, subcutaneous, q24h  [Held by provider] ferrous sulfate, 325 mg, oral, BID  [Held by provider] fluticasone furoate-vilanteroL, 1 puff, inhalation, Daily  levETIRAcetam, 500 mg, intravenous, q12h  [Held by provider] norethindrone, 1 tablet, oral, Daily  oxygen, , inhalation, Continuous - Inhalation  polyethylene glycol, 17 g, oral, Daily  potassium chloride, 20 mEq, intravenous, Once      Continuous medications  dexmedeTOMIDine, 0.1-1.5 mcg/kg/hr, Last Rate: Stopped (04/18/25 0930)  sodium chloride 0.9%, 75 mL/hr, Last Rate: 75 mL/hr (04/18/25 0211)      PRN medications  PRN medications: acetaminophen **OR** acetaminophen, albuterol, [Held by provider] albuterol, albuterol, calcium gluconate, calcium gluconate, magnesium sulfate, magnesium sulfate, potassium chloride    Results for orders placed or performed during the hospital encounter of 04/17/25 (from " the past 24 hours)   BLOOD GAS VENOUS FULL PANEL   Result Value Ref Range    POCT pH, Venous 7.32 (L) 7.33 - 7.43 pH    POCT pCO2, Venous 46 41 - 51 mm Hg    POCT pO2, Venous 21 (L) 35 - 45 mm Hg    POCT SO2, Venous 38 (L) 45 - 75 %    POCT Oxy Hemoglobin, Venous 37.4 (L) 45.0 - 75.0 %    POCT Hematocrit Calculated, Venous 33.0 (L) 36.0 - 46.0 %    POCT Sodium, Venous 137 136 - 145 mmol/L    POCT Potassium, Venous 4.1 3.5 - 5.3 mmol/L    POCT Chloride, Venous 108 (H) 98 - 107 mmol/L    POCT Ionized Calicum, Venous 1.15 1.10 - 1.33 mmol/L    POCT Glucose, Venous 83 74 - 99 mg/dL    POCT Lactate, Venous 0.8 0.4 - 2.0 mmol/L    POCT Base Excess, Venous -2.5 (L) -2.0 - 3.0 mmol/L    POCT HCO3 Calculated, Venous 23.7 22.0 - 26.0 mmol/L    POCT Hemoglobin, Venous 10.9 (L) 12.0 - 16.0 g/dL    POCT Anion Gap, Venous 9.0 (L) 10.0 - 25.0 mmol/L    Patient Temperature 37.0 degrees Celsius    FiO2 30 %   Renal Function Panel   Result Value Ref Range    Glucose 86 74 - 99 mg/dL    Sodium 138 136 - 145 mmol/L    Potassium 4.1 3.5 - 5.3 mmol/L    Chloride 106 98 - 107 mmol/L    Bicarbonate 25 21 - 32 mmol/L    Anion Gap 11 10 - 20 mmol/L    Urea Nitrogen 7 6 - 23 mg/dL    Creatinine 0.74 0.50 - 1.05 mg/dL    eGFR >90 >60 mL/min/1.73m*2    Calcium 8.5 (L) 8.6 - 10.6 mg/dL    Phosphorus 2.6 2.5 - 4.9 mg/dL    Albumin 3.9 3.4 - 5.0 g/dL   CBC   Result Value Ref Range    WBC 9.2 4.4 - 11.3 x10*3/uL    nRBC 0.0 0.0 - 0.0 /100 WBCs    RBC 4.63 4.00 - 5.20 x10*6/uL    Hemoglobin 10.8 (L) 12.0 - 16.0 g/dL    Hematocrit 34.2 (L) 36.0 - 46.0 %    MCV 74 (L) 80 - 100 fL    MCH 23.3 (L) 26.0 - 34.0 pg    MCHC 31.6 (L) 32.0 - 36.0 g/dL    RDW 14.1 11.5 - 14.5 %    Platelets 224 150 - 450 x10*3/uL   Magnesium   Result Value Ref Range    Magnesium 1.88 1.60 - 2.40 mg/dL   Coagulation Screen   Result Value Ref Range    Protime 12.1 9.8 - 12.4 seconds    INR 1.1 0.9 - 1.1    aPTT 30 26 - 36 seconds   Drug Screen, Urine   Result Value Ref Range     Amphetamine Screen, Urine Presumptive Positive (A) Presumptive Negative    Barbiturate Screen, Urine Presumptive Negative Presumptive Negative    Benzodiazepines Screen, Urine Presumptive Positive (A) Presumptive Negative    Cannabinoid Screen, Urine Presumptive Negative Presumptive Negative    Cocaine Metabolite Screen, Urine Presumptive Negative Presumptive Negative    Fentanyl Screen, Urine Presumptive Positive (A) Presumptive Negative    Opiate Screen, Urine Presumptive Negative Presumptive Negative    Oxycodone Screen, Urine Presumptive Negative Presumptive Negative    PCP Screen, Urine Presumptive Negative Presumptive Negative    Methadone Screen, Urine Presumptive Negative Presumptive Negative   Coagulation Screen   Result Value Ref Range    Protime 12.6 (H) 9.8 - 12.4 seconds    INR 1.1 0.9 - 1.1    aPTT 32 26 - 36 seconds   CBC   Result Value Ref Range    WBC 9.4 4.4 - 11.3 x10*3/uL    nRBC 0.0 0.0 - 0.0 /100 WBCs    RBC 4.41 4.00 - 5.90 x10*6/uL    Hemoglobin 10.1 (L) 12.0 - 17.5 g/dL    Hematocrit 32.6 (L) 36.0 - 52.0 %    MCV 74 (L) 80 - 100 fL    MCH 22.9 (L) 26.0 - 34.0 pg    MCHC 31.0 (L) 32.0 - 36.0 g/dL    RDW 14.2 11.5 - 14.5 %    Platelets 173 150 - 450 x10*3/uL   Magnesium   Result Value Ref Range    Magnesium 1.75 1.60 - 2.40 mg/dL   Renal Function Panel   Result Value Ref Range    Glucose 88 74 - 99 mg/dL    Sodium 137 136 - 145 mmol/L    Potassium 3.4 (L) 3.5 - 5.3 mmol/L    Chloride 107 98 - 107 mmol/L    Bicarbonate 21 21 - 32 mmol/L    Anion Gap 12 10 - 20 mmol/L    Urea Nitrogen 7 6 - 23 mg/dL    Creatinine 0.61 0.50 - 1.30 mg/dL    eGFR >90 >60 mL/min/1.73m*2    Calcium 8.5 (L) 8.6 - 10.6 mg/dL    Phosphorus 2.6 2.5 - 4.9 mg/dL    Albumin 3.8 3.4 - 5.0 g/dL     XR chest 1 view  Result Date: 4/17/2025  STUDY: Chest Radiograph;  04/17/2025 at 11:16 hours. INDICATION: Statis post ETT retraction. COMPARISON: XR Chest 04/17/2025 at 09:42 hours. ACCESSION NUMBER(S): [id number] ORDERING  CLINICIAN: FRANCHESCA PUENTE TECHNIQUE:  Frontal chest was obtained at 11:16 hours. FINDINGS: Endotracheal tube is in place with the distal tip 3.0 cm above the maty.  Nasogastric tube is in place with distal tip in the stomach. CARDIOMEDIASTINAL SILHOUETTE: Cardiomediastinal silhouette is normal in size and configuration.  LUNGS: Lungs are clear.  ABDOMEN: No remarkable upper abdominal findings.  BONES: No acute osseous changes.    1.  Endotracheal tube with distal tip 3.0 cm above the maty. 2.  Nasogastric tube with distal tip in the stomach. Signed by Frank Parekh MD    CT head wo IV contrast  Result Date: 4/17/2025  Interpreted By:  Ben Rashid, STUDY: CT HEAD WO IV CONTRAST;  4/17/2025 9:48 am   INDICATION: Signs/Symptoms:seizure, ams.     COMPARISON: None.   ACCESSION NUMBER(S): [id number]   ORDERING CLINICIAN: FRANCHESCA PUENTE   TECHNIQUE: Noncontrast axial CT scan of head was performed. Angled reformats in brain and bone windows were generated. The images were reviewed in bone, brain, blood and soft tissue windows.   FINDINGS: CSF Spaces: The ventricles, sulci and basal cisterns are within normal limits. There is no abnormal extraaxial fluid collection.   Parenchyma:  The grey-white differentiation is intact. There is no mass effect or midline shift.  There is no abnormal intracranial hemorrhage.   Calvarium: The calvarium is unremarkable.   Paranasal sinuses and mastoids: Visualized paranasal sinuses and mastoids are clear.       Unremarkable CT head.   This study was interpreted at [facility].   MACRO: None   Signed by: Ben Rashid 4/17/2025 10:12 AM Dictation workstation:   [id number]    XR chest 1 view  Result Date: 4/17/2025  STUDY: Chest Radiograph; 04/17/2025 9:43 AM INDICATION: Status post intubation. COMPARISON: None. ACCESSION NUMBER(S): [id number] ORDERING CLINICIAN: FRANCHESCA PUENTE TECHNIQUE:  Frontal chest was obtained at 09:42:00 hours. FINDINGS:  CARDIOMEDIASTINAL SILHOUETTE: Cardiomediastinal silhouette is normal in size and configuration.  LUNGS: ET tube approximately 0.6 cm above the maty.  NG tube with tip below the diaphragm. Lungs are clear.  ABDOMEN: No remarkable upper abdominal findings.  BONES: No acute osseous changes.    ET tube approximately 0.6 cm above the maty.  Consider retracting, 3 to 4 cm. 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 He was witnessed to have seizure-like activity on 4/16 PM which stopped with home rescue med, but had additional seizure-like activity on 4/17 AM which lasted for 15min, was brought to ER, intubated for airway protection. Friend describes event as LUE stiffening, followed by full body stiffening then shaking all over, with L head turn and L face twitching. There was foaming at the mouth and post-ictal coma. No B/B incontinence or tongue biting. Admitting to NSU for cvEEG and further monitoring. Pt now extubated on Precedex gtt. cvEEG negative for epileptiform activity to this point.    Updates 4/18:  -Transferred to floor status  -Keppra 250 evening dose followed by off  -Starting Vimpat 100mg BID  -DC home Lamictal    #Paroxysmal events, c/f seizure  #Idiopathic hypersomnia vs narcolepsy  ::Events have not to this date been proven to be epileptic  ::EEG without epileptiform activity  ::Lamictal level undetectable  -Transferred to floor status  -Keppra 250 evening dose followed by off  -Starting Vimpat 100mg BID  -DC home Lamictal (undetectable level)  -cvEEG  -Holding Adderall, modafinil    #MDD  #LETICIA  -C/w home sertraline 50mg daily    F: 50cc/hr (will dc once passes  bedside swallow assessment)  E: PRN  N: NPO (pending bedside swallow)  A: PIV  DVT: Lovenox  PPI: PRN  Code Status: Full Code  NOK: Shanique Tavares (Friend)  [phone] (Mobile)     Mikie Marinelli, DO  PGY-2 Neurology

## 2025-04-18 NOTE — PROGRESS NOTES
Communication Note    Patient Name: Lisa Ramirez  MRN: [id number]  Today's Date: 4/18/2025   Room: 02/02A    Discipline: Physical Therapy      PT Missed Visit: Yes  Missed Visit Reason:  (Pt sleeping soundly. Pt's mother present at bedside and requesting PT hold off to allow pt time to sleep.)      04/18/25 at 10:44 AM   Ana Maria Parker, PT   Rehab Office: [phone]

## 2025-04-18 NOTE — NURSING NOTE
"ANNIE Initial Assessment:    ANNIE services consulted by nursing due to agitation (frustration with care and verbal aggression by patient) and a disruptive family member (mother of patient).     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 Pt presented to the ED for breakthrough seizures.    On arrival to bedside, CORBIN and STEFFEN Huerta are speaking with the patients mother. The pt's mother is verbally aggressive, loud and disrupting care due to a semi private room.  Per chart review the pt is no longer on sedation, he is currently asleep. ANNIE RN did not disturb patient in order to promote rest during hospitalization. Originally, [employer] and ANNIE were to speak with the pt's mother in private in order to not disrupt the care provided on the unit, the mother verbalized her frustration stating she will wait for her .     Per mother, the pt and family demand to find another respiratory therapist due to \"inappropriate touching\". Although mom was at bedside while RT was working with the pt, she would not provide any further details on what occurred. Assessment was difficult due to her behavior, both mom and dad were dismissive of ANNIE, stating \"I am only concerned about their health, we are not worried about anything else\". This RN was unable to provided proper education of ANNIE role due to her dismissive behavior. The pt's mother was loud, continued to interrupt both [employer] and nursing, and became verbally aggressive. Once the pt's father arrived to bedside, [employer] and nursing were able to speak.     Per family request and the patient, a new respiratory therapist will be assigned to him.     Nursing denies any further needs.     ANNIE services available to patient and staff 24/7 throughout hospitalization. ANNIE will continue to perform q12 hr rounding to ensure safety of patient and staff. Please secure chat \"ANNIE\" with any questions or concerns.   "

## 2025-04-18 NOTE — HOSPITAL COURSE
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     Pt reportedly had event 4/16 found bilateral stiffening, then convulsions with L head deviation, L face clonic activity, on/off for 5-10 minutes. 4/17 LUE extension/stiffening, then full body stiffening/shaking, foaming at mouth, eyes alternating between open/closed. EMS called. ER intubated d/t poor mentation, agitated requiring ketamine, fentanyl, propofol, Keppra load 1.5g IV, admitted to the NSU for close monitoring.    While in the NSU, pt successfully extubated 4/18 AM. cvEEG showing no epileptiform activity. Lamotrigine level was undetectable, therefore not resumed. Pt was initially on Keppra 500mg BID, then transitioned to Lacosamide 100mg BID while admitted. Given no epileptiform activity and description of events, events are likely non-epileptic.    On 4/19, patient removed EEG electrodes approximately 1hr prior to an event of typical character, recorded on video monitor - this event involved arrhythmic, varied amplitude twitching of LUE with closed eyes, not rousable during event. No intervention, improved over time.     In summary, Spenser has a complex history of spells including semiologies concerning for both epileptic and nonepileptic events. No epileptic events were recorded despite prolonged EEG monitoring this admission, and patient was switched to 100mg lacosamide BID (had undetectable lamotrigine level on admission, LEV ineffective).     To Do/Follow-up:  [ ] Discussed with [facility] Neurology: They are happy to arrange a second opinion outpatient - patient can call [phone], report an epilepsy referral, and ask to be placed on  cancellation list.

## 2025-04-19 ENCOUNTER — PHARMACY VISIT (OUTPATIENT)
Dept: PHARMACY | Facility: CLINIC | Age: 20
End: 2025-04-19
Payer: COMMERCIAL

## 2025-04-19 LAB
ALBUMIN SERPL BCP-MCNC: 3.8 G/DL (ref 3.4–5)
ALBUMIN SERPL BCP-MCNC: 3.8 G/DL (ref 3.4–5)
ANION GAP SERPL CALC-SCNC: 14 MMOL/L (ref 10–20)
ANION GAP SERPL CALC-SCNC: 14 MMOL/L (ref 10–20)
BUN SERPL-MCNC: 5 MG/DL (ref 6–23)
BUN SERPL-MCNC: 5 MG/DL (ref 6–23)
CALCIUM SERPL-MCNC: 8.4 MG/DL (ref 8.6–10.6)
CALCIUM SERPL-MCNC: 8.4 MG/DL (ref 8.6–10.6)
CHLORIDE SERPL-SCNC: 108 MMOL/L (ref 98–107)
CHLORIDE SERPL-SCNC: 108 MMOL/L (ref 98–107)
CO2 SERPL-SCNC: 22 MMOL/L (ref 21–32)
CO2 SERPL-SCNC: 22 MMOL/L (ref 21–32)
CREAT SERPL-MCNC: 0.6 MG/DL (ref 0.5–1.3)
CREAT SERPL-MCNC: 0.6 MG/DL (ref 0.5–1.3)
EGFRCR SERPLBLD CKD-EPI 2021: >90 ML/MIN/1.73M*2
EGFRCR SERPLBLD CKD-EPI 2021: >90 ML/MIN/1.73M*2
ERYTHROCYTE [DISTWIDTH] IN BLOOD BY AUTOMATED COUNT: 14.4 % (ref 11.5–14.5)
ERYTHROCYTE [DISTWIDTH] IN BLOOD BY AUTOMATED COUNT: 14.4 % (ref 11.5–14.5)
GLUCOSE SERPL-MCNC: 64 MG/DL (ref 74–99)
GLUCOSE SERPL-MCNC: 64 MG/DL (ref 74–99)
HCT VFR BLD AUTO: 32.9 % (ref 36–52)
HCT VFR BLD AUTO: 32.9 % (ref 36–52)
HGB BLD-MCNC: 9.9 G/DL (ref 12–17.5)
HGB BLD-MCNC: 9.9 G/DL (ref 12–17.5)
MAGNESIUM SERPL-MCNC: 1.9 MG/DL (ref 1.6–2.4)
MAGNESIUM SERPL-MCNC: 1.9 MG/DL (ref 1.6–2.4)
MCH RBC QN AUTO: 23 PG (ref 26–34)
MCH RBC QN AUTO: 23 PG (ref 26–34)
MCHC RBC AUTO-ENTMCNC: 30.1 G/DL (ref 32–36)
MCHC RBC AUTO-ENTMCNC: 30.1 G/DL (ref 32–36)
MCV RBC AUTO: 76 FL (ref 80–100)
MCV RBC AUTO: 76 FL (ref 80–100)
NRBC BLD-RTO: 0 /100 WBCS (ref 0–0)
NRBC BLD-RTO: 0 /100 WBCS (ref 0–0)
PHOSPHATE SERPL-MCNC: 3.4 MG/DL (ref 2.5–4.9)
PHOSPHATE SERPL-MCNC: 3.4 MG/DL (ref 2.5–4.9)
PLATELET # BLD AUTO: 156 X10*3/UL (ref 150–450)
PLATELET # BLD AUTO: 156 X10*3/UL (ref 150–450)
POTASSIUM SERPL-SCNC: 3.5 MMOL/L (ref 3.5–5.3)
POTASSIUM SERPL-SCNC: 3.5 MMOL/L (ref 3.5–5.3)
RBC # BLD AUTO: 4.31 X10*6/UL (ref 4–5.9)
RBC # BLD AUTO: 4.31 X10*6/UL (ref 4–5.9)
SODIUM SERPL-SCNC: 140 MMOL/L (ref 136–145)
SODIUM SERPL-SCNC: 140 MMOL/L (ref 136–145)
WBC # BLD AUTO: 6.9 X10*3/UL (ref 4.4–11.3)
WBC # BLD AUTO: 6.9 X10*3/UL (ref 4.4–11.3)

## 2025-04-19 PROCEDURE — 2500000002 HC RX 250 W HCPCS SELF ADMINISTERED DRUGS (ALT 637 FOR MEDICARE OP, ALT 636 FOR OP/ED)

## 2025-04-19 PROCEDURE — 83735 ASSAY OF MAGNESIUM: CPT

## 2025-04-19 PROCEDURE — 95720 EEG PHY/QHP EA INCR W/VEEG: CPT | Performed by: PSYCHIATRY & NEUROLOGY

## 2025-04-19 PROCEDURE — 85027 COMPLETE CBC AUTOMATED: CPT

## 2025-04-19 PROCEDURE — 2500000001 HC RX 250 WO HCPCS SELF ADMINISTERED DRUGS (ALT 637 FOR MEDICARE OP)

## 2025-04-19 PROCEDURE — 80069 RENAL FUNCTION PANEL: CPT

## 2025-04-19 PROCEDURE — 2500000005 HC RX 250 GENERAL PHARMACY W/O HCPCS

## 2025-04-19 PROCEDURE — 36415 COLL VENOUS BLD VENIPUNCTURE: CPT

## 2025-04-19 PROCEDURE — 2500000004 HC RX 250 GENERAL PHARMACY W/ HCPCS (ALT 636 FOR OP/ED)

## 2025-04-19 PROCEDURE — 2500000004 HC RX 250 GENERAL PHARMACY W/ HCPCS (ALT 636 FOR OP/ED): Mod: JZ

## 2025-04-19 PROCEDURE — 1100000001 HC PRIVATE ROOM DAILY

## 2025-04-19 PROCEDURE — 95715 VEEG EA 12-26HR INTMT MNTR: CPT

## 2025-04-19 PROCEDURE — RXMED WILLOW AMBULATORY MEDICATION CHARGE

## 2025-04-19 PROCEDURE — 99232 SBSQ HOSP IP/OBS MODERATE 35: CPT | Performed by: PSYCHIATRY & NEUROLOGY

## 2025-04-19 RX ORDER — LACOSAMIDE 100 MG/1
100 TABLET ORAL 2 TIMES DAILY
Qty: 180 TABLET | Refills: 3 | Status: SHIPPED | OUTPATIENT
Start: 2025-04-19

## 2025-04-19 RX ORDER — DEXTROAMPHETAMINE SACCHARATE, AMPHETAMINE ASPARTATE, DEXTROAMPHETAMINE SULFATE AND AMPHETAMINE SULFATE 2.5; 2.5; 2.5; 2.5 MG/1; MG/1; MG/1; MG/1
15 TABLET ORAL
Refills: 0 | Status: DISCONTINUED | OUTPATIENT
Start: 2025-04-19 | End: 2025-04-21 | Stop reason: HOSPADM

## 2025-04-19 RX ORDER — LACOSAMIDE 100 MG/1
100 TABLET ORAL 2 TIMES DAILY
Status: DISCONTINUED | OUTPATIENT
Start: 2025-04-19 | End: 2025-04-21 | Stop reason: HOSPADM

## 2025-04-19 RX ADMIN — LACOSAMIDE 100 MG: 100 TABLET, FILM COATED ORAL at 09:31

## 2025-04-19 RX ADMIN — POLYETHYLENE GLYCOL 3350 17 G: 17 POWDER, FOR SOLUTION ORAL at 09:19

## 2025-04-19 RX ADMIN — BUPROPION HYDROCHLORIDE 450 MG: 150 TABLET, EXTENDED RELEASE ORAL at 08:47

## 2025-04-19 RX ADMIN — FERROUS SULFATE TAB 325 MG (65 MG ELEMENTAL FE) 325 MG: 325 (65 FE) TAB at 08:47

## 2025-04-19 RX ADMIN — ACETAMINOPHEN 650 MG: 325 TABLET, FILM COATED ORAL at 21:28

## 2025-04-19 RX ADMIN — MODAFINIL 200 MG: 100 TABLET ORAL at 17:08

## 2025-04-19 RX ADMIN — ACETAMINOPHEN 650 MG: 325 TABLET, FILM COATED ORAL at 08:49

## 2025-04-19 RX ADMIN — MODAFINIL 200 MG: 100 TABLET ORAL at 08:46

## 2025-04-19 RX ADMIN — LACOSAMIDE 100 MG: 100 TABLET, FILM COATED ORAL at 21:48

## 2025-04-19 RX ADMIN — ALBUTEROL SULFATE 2.5 MG: 2.5 SOLUTION RESPIRATORY (INHALATION) at 21:06

## 2025-04-19 RX ADMIN — DEXTROAMPHETAMINE SACCHARATE, AMPHETAMINE ASPARTATE, DEXTROAMPHETAMINE SULFATE AND AMPHETAMINE SULFATE 15 MG: 2.5; 2.5; 2.5; 2.5 TABLET ORAL at 17:08

## 2025-04-19 RX ADMIN — DEXTROAMPHETAMINE SACCHARATE, AMPHETAMINE ASPARTATE, DEXTROAMPHETAMINE SULFATE AND AMPHETAMINE SULFATE 15 MG: 2.5; 2.5; 2.5; 2.5 TABLET ORAL at 08:47

## 2025-04-19 RX ADMIN — ALBUTEROL SULFATE 2.5 MG: 2.5 SOLUTION RESPIRATORY (INHALATION) at 13:01

## 2025-04-19 RX ADMIN — GABAPENTIN 300 MG: 300 CAPSULE ORAL at 17:08

## 2025-04-19 RX ADMIN — FERROUS SULFATE TAB 325 MG (65 MG ELEMENTAL FE) 325 MG: 325 (65 FE) TAB at 21:28

## 2025-04-19 RX ADMIN — IBUPROFEN 800 MG: 400 TABLET ORAL at 08:49

## 2025-04-19 RX ADMIN — ALBUTEROL SULFATE 2.5 MG: 2.5 SOLUTION RESPIRATORY (INHALATION) at 07:19

## 2025-04-19 RX ADMIN — SODIUM CHLORIDE SOLN NEBU 3% 3 ML: 3 NEBU SOLN at 20:58

## 2025-04-19 RX ADMIN — ENOXAPARIN SODIUM 40 MG: 100 INJECTION SUBCUTANEOUS at 21:28

## 2025-04-19 RX ADMIN — SERTRALINE 50 MG: 50 TABLET, FILM COATED ORAL at 08:47

## 2025-04-19 RX ADMIN — DEXTROAMPHETAMINE SACCHARATE, AMPHETAMINE ASPARTATE, DEXTROAMPHETAMINE SULFATE AND AMPHETAMINE SULFATE 15 MG: 2.5; 2.5; 2.5; 2.5 TABLET ORAL at 12:50

## 2025-04-19 RX ADMIN — SODIUM CHLORIDE SOLN NEBU 3% 3 ML: 3 NEBU SOLN at 04:03

## 2025-04-19 RX ADMIN — SODIUM CHLORIDE SOLN NEBU 3% 3 ML: 3 NEBU SOLN at 12:49

## 2025-04-19 ASSESSMENT — PAIN - FUNCTIONAL ASSESSMENT
PAIN_FUNCTIONAL_ASSESSMENT: 0-10
PAIN_FUNCTIONAL_ASSESSMENT: 0-10

## 2025-04-19 ASSESSMENT — COGNITIVE AND FUNCTIONAL STATUS - GENERAL
CLIMB 3 TO 5 STEPS WITH RAILING: A LITTLE
MOBILITY SCORE: 22
WALKING IN HOSPITAL ROOM: A LITTLE

## 2025-04-19 ASSESSMENT — PAIN SCALES - GENERAL
PAINLEVEL_OUTOF10: 5 - MODERATE PAIN
PAINLEVEL_OUTOF10: 0 - NO PAIN

## 2025-04-19 NOTE — NURSING NOTE
Around 3:30pm patient had witnessed seizure by nurse and friend that lasted about 4 minutes. MD notified at 3:33pm. Left clonic arm jerking present at time. O2 applied for comfort. Vital signs WNL. After waking up from nap client assessed. A&Ox4, vitals stable, no pain or discomfort.

## 2025-04-19 NOTE — PROGRESS NOTES
Physical Therapy                 Therapy Communication Note    Patient Name: Lisa Ramirez  MRN: [id number]  Department: [place] [address]  Room: 203/203-A  Today's Date: 4/19/2025     Discipline: Physical Therapy    PT Missed Visit: Yes     Missed Visit Reason: Other (Comment) (Screen.  Pt is up ad jag, reporting he is at baseline, and politely declining PT eval.  Is interested in OP PT.  Sent message to team.  Will d/c PT orders.  Please reconsult if any change in status or mobility concerns.)    Missed Time: 1215Attempt

## 2025-04-19 NOTE — PROGRESS NOTES
Physical Therapy                 Therapy Communication Note    Patient Name: Lisa Ramirez  MRN: [id number]  Department: [place] [address]  Room: 203/203-A  Today's Date: 4/19/2025     Discipline: Physical Therapy    PT Missed Visit: Yes     Missed Visit Reason: Patient refused (Pt sleeping .  Friend present.  Difficult to awaken.  Reporting had been to bathroom and wanting to rest now.)    Missed Time: 1049 Attempt

## 2025-04-19 NOTE — PROGRESS NOTES
"Child Life Assessment:   Reason for Consult  Discipline: Child Life Specialist  Reason for Consult: Coping skill development/planning  Referral Source: Physician/Resident    Patient Intervention(s)  Type of Intervention Performed: Healing environment interventions  Healing Environment Intervention(s): Assessment, Address practical patient/family needs, Empathetic listening/validation of emotions, Coping skill development/planning, Facilitate calming/relaxation, Opportunity for choice and control, Orientation to services    Support Provided to Family  Support Provided to Family: Family present for patient session  Family Present for Patient Session: Other(s)  Other(s) Relationship: friends  Family Participation: Interactive    Evaluation  Evaluation/Plan of Care: Patient/family receptive    Session Details: Certified Child Life Specialist (CCLS) introduced child life services to patient and patient's two friends and engaged in conversation regarding patient's coping with hospitalization.  Patient shared that he has numerous healthy coping habits that he uses regularly, such as journaling, but expressed frustration with his current admission.  Patient shared that he feels one of his strengths, which can also bring some challenges, is his confidence in advocating for himself.  CCLS and patient discussed additional ways for patient to feel that staff were meeting his individual needs.  Patient requested that prior to any important conversations, staff ensure that patient is fully alert, as he believes he sometimes appears present while actually being drowsy.  Patient's RN was present and suggested placing a \"Please see RN before entering\" sign, which patient found helpful.  Stress ball and activities provided to normalize the hospital environment.  Child life services available as needed throughout admission.    NANCY Delgado  Epic Secure Chat    "

## 2025-04-19 NOTE — CARE PLAN
The patient's goals for the shift include      The clinical goals for the shift include Pt will remain seizure free overnight    Problem: Safety - Medical Restraint  Goal: Free from restraint(s) (Restraint for Interference with Medical Device)  Outcome: Progressing     Problem: Pain - Adult  Goal: Verbalizes/displays adequate comfort level or baseline comfort level  Outcome: Progressing     Problem: Safety - Adult  Goal: Free from fall injury  Outcome: Progressing     Problem: Discharge Planning  Goal: Discharge to home or other facility with appropriate resources  Outcome: Progressing       Problem: Fall/Injury  Goal: Not fall by end of shift  Outcome: Progressing     Problem: Fall/Injury  Goal: Be free from injury by end of the shift  Outcome: Progressing     Problem: Pain  Goal: Walks with improved pain control throughout the shift  Outcome: Progressing     Problem: Pain  Goal: Turns in bed with improved pain control throughout the shift  Outcome: Progressing     Problem: Respiratory  Goal: Minimize anxiety/maximize coping throughout shift  Outcome: Progressing     Problem: Respiratory  Goal: Minimal/no exertional discomfort or dyspnea this shift  Outcome: Progressing     Problem: Skin  Goal: Promote skin healing  Outcome: Progressing

## 2025-04-19 NOTE — DISCHARGE INSTRUCTIONS
Spenser,     You were admitted due to concern for a seizure. Your EEG was normal and you were quickly extubated. We changed your seizure medicine to just Lacosamide 100mg twice daily. Please continue this medication and follow up with Dr Aleman in neurology clinic. You have an appointment scheduled for 4/23/25, so keep this appointment.    Also, since you had a seizure, you need to practice seizure precautions. This includes the following:     - DO NOT drive, use power tools, operate heavy machinery, or be on ladders     - use the shower, not the bath     - refrain from ANY activity which could result in injury from loss-of-consciousness      - These restrictions should continue until instructed by a doctor to do otherwise.     Your  Neurology Care Team

## 2025-04-19 NOTE — PROGRESS NOTES
"Lisa Ramirez is a 20 y.o. adult on day 2 of admission presenting with Seizure (Multi).      Subjective   Seen this AM, with patient's partner, Aleksey at bedside. Patient just received albuterol treatment and was sleeping. Per partner, albuterol tends to make patient more sleepy. Patient answering questions with head nods, only opening eyes briefly. He reported sleeping well. He was instructed on incentive spirometer d/t continued subjective difficulty breathing. Patient put IS to mouth but refused to give effort. Patient repositioned with help from partner, Aleksey, to open up lungs and be in better position for IS use. Again, patient put IS to mouth but refused to breath into it. Oxygen was taken off and patient monitored for a few minutes. Patient refused pulse oximeter check, despite multiple attempts and explanation. Patient well-perfused and with no change in respiratory pattern and effort.      Per nursing, about 30min later, pulse ox was allowed and 94% on RA.    On rounds this AM, patient and family very frustrated and scared regarding breakthrough seizure. Questions were answered regarding new AED regimen (stop keppra, stop lamotrigine as level was undetectable, start lacosamide 100mg BID). Pt has follow up with neuro next week. Patient increasing agitated and unwilling to discuss with team, stating he wants to stay inpatient, refusing discharge.     Attempted to transfer to [facility] per patient request, however no indication for inpatient care so [facility] declined transfer. Offered outpatient follow up for second opinion    Objective     Last Recorded Vitals  Blood pressure 125/73, pulse 89, temperature 36.4 °C (97.5 °F), temperature source Temporal, resp. rate 18, height 1.727 m (5' 8\"), weight 87.5 kg (192 lb 14.4 oz), SpO2 99%.    Physical Exam  Neurological Exam  Eyes open to voice (with persistence), answer questions with head nods and mumbling voice.   Tracks with eyes throughout room.  Face " symmetric.  Lying comfortably in bed, moving all extremities equally.     On repeat exam later in morning:  Patient alert, awake, conversational, agitated   Tracking throughout room, face symmetric  Moving all 4 extremities antigravity.    Relevant Results    Scheduled medications  amphetamine-dextroamphetamine, 15 mg, oral, TID  [Held by provider] amphetamine-dextroamphetamine XR, 30 mg, oral, q24h  buPROPion XL, 450 mg, oral, Daily  enoxaparin, 40 mg, subcutaneous, q24h  ferrous sulfate, 325 mg, oral, BID  fluticasone furoate-vilanteroL, 1 puff, inhalation, Daily  gabapentin, 300 mg, oral, Daily  lacosamide, 100 mg, intravenous, q12h  modafinil, 200 mg, oral, BID  [Held by provider] norethindrone, 1 tablet, oral, Daily  polyethylene glycol, 17 g, oral, Daily  potassium chloride, 20 mEq, intravenous, Once  sertraline, 50 mg, oral, Daily  sodium chloride, 3 mL, nebulization, q6h BRODIE      Continuous medications       PRN medications  PRN medications: acetaminophen **OR** acetaminophen, albuterol, albuterol, benzonatate, ibuprofen    Results for orders placed or performed during the hospital encounter of 04/17/25 (from the past 24 hours)   Troponin I, High Sensitivity   Result Value Ref Range    Troponin I, High Sensitivity (CMC) <3 0 - 53 ng/L   ECG 12 lead   Result Value Ref Range    Ventricular Rate 76 BPM    Atrial Rate 76 BPM    SC Interval 146 ms    QRS Duration 84 ms    QT Interval 408 ms    QTC Calculation(Bazett) 459 ms    P Axis 65 degrees    R Axis 64 degrees    T Axis 55 degrees    QRS Count 12 beats    Q Onset 216 ms    P Onset 143 ms    P Offset 197 ms    T Offset 420 ms    QTC Fredericia 441 ms     ECG 12 lead  Result Date: 4/18/2025  Normal sinus rhythm Normal ECG When compared with ECG of 18-APR-2025 09:04, No significant change was found    EEG  IMPRESSION Impression This rEEG is indicative of severe diffuse encephalopathy. No epileptiform discharges or lateralizing signs are seen. A full report will  be scanned into the patient's chart at a later time. This report has been interpreted and electronically signed by    XR chest 1 view  Result Date: 4/17/2025  STUDY: Chest Radiograph;  04/17/2025 at 11:16 hours. INDICATION: Statis post ETT retraction. COMPARISON: XR Chest 04/17/2025 at 09:42 hours. ACCESSION NUMBER(S): [id number] ORDERING CLINICIAN: FRANCHESCA PUENTE TECHNIQUE:  Frontal chest was obtained at 11:16 hours. FINDINGS: Endotracheal tube is in place with the distal tip 3.0 cm above the maty.  Nasogastric tube is in place with distal tip in the stomach. CARDIOMEDIASTINAL SILHOUETTE: Cardiomediastinal silhouette is normal in size and configuration.  LUNGS: Lungs are clear.  ABDOMEN: No remarkable upper abdominal findings.  BONES: No acute osseous changes.    1.  Endotracheal tube with distal tip 3.0 cm above the maty. 2.  Nasogastric tube with distal tip in the stomach. Signed by Frank Parekh MD    CT head wo IV contrast  Result Date: 4/17/2025  Interpreted By:  Ben Rashid, STUDY: CT HEAD WO IV CONTRAST;  4/17/2025 9:48 am   INDICATION: Signs/Symptoms:seizure, ams.     COMPARISON: None.   ACCESSION NUMBER(S): [id number]   ORDERING CLINICIAN: FRANCHESCA PUENTE   TECHNIQUE: Noncontrast axial CT scan of head was performed. Angled reformats in brain and bone windows were generated. The images were reviewed in bone, brain, blood and soft tissue windows.   FINDINGS: CSF Spaces: The ventricles, sulci and basal cisterns are within normal limits. There is no abnormal extraaxial fluid collection.   Parenchyma:  The grey-white differentiation is intact. There is no mass effect or midline shift.  There is no abnormal intracranial hemorrhage.   Calvarium: The calvarium is unremarkable.   Paranasal sinuses and mastoids: Visualized paranasal sinuses and mastoids are clear.       Unremarkable CT head.   This study was interpreted at [facility].   MACRO: None   Signed by: Ben Rashid  4/17/2025 10:12 AM Dictation workstation:   [id number]    XR chest 1 view  Result Date: 4/17/2025  STUDY: Chest Radiograph; 04/17/2025 9:43 AM INDICATION: Status post intubation. COMPARISON: None. ACCESSION NUMBER(S): [id number] ORDERING CLINICIAN: FRANCHESCA PUENTE TECHNIQUE:  Frontal chest was obtained at 09:42:00 hours. FINDINGS: CARDIOMEDIASTINAL SILHOUETTE: Cardiomediastinal silhouette is normal in size and configuration.  LUNGS: ET tube approximately 0.6 cm above the maty.  NG tube with tip below the diaphragm. Lungs are clear.  ABDOMEN: No remarkable upper abdominal findings.  BONES: No acute osseous changes.    ET tube approximately 0.6 cm above the maty.  Consider retracting, 3 to 4 cm. 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 He was witnessed to have seizure-like activity on 4/16 PM which stopped with home rescue med, but had additional seizure-like activity on 4/17 AM which lasted for 15min, was brought to ER, intubated for airway protection. Friend describes event as LUE stiffening, followed by full body stiffening then shaking all over, with L head turn and L face twitching. There was foaming at the mouth and post-ictal coma. No B/B incontinence or tongue biting. Admitted to [facility]U for cvEEG and further monitoring. Pt extubated without issues. cvEEG negative for epileptiform activity to this point. Pt requesting transfer to [facility] however no further medical needs other than discharge, so [facility] declined transfer. Patient with complaints of subjective SOB, but refusing pulse ox and repositioning, refusing to swallow. Plan to stop Keppra and start Vimpat 100 BID,  then follow up outpatient.    Updates 4/19:  - NC 4L weaned to RA this morning  - Continue Vimpat 100mg BID  - Patient refusing discharge, despite no medical indication for inpatient management    #Paroxysmal events, c/f seizure  #Idiopathic hypersomnia vs narcolepsy  ::Events have not to this date been proven to be epileptic  ::EEG without epileptiform activity  ::Lamictal level undetectable  -Off Keppra  -Vimpat 100mg BID  -DC home Lamictal (undetectable level)  -cvEEG  -Restarted home Adderall  -Holding modafinil    #MDD  #LETICIA  -C/w home sertraline 50mg daily    F: PRN  E: PRN  N: regular diet  A: PIV  DVT: Lovenox  PPI: PRN  Code Status: Full Code  NOK: Shanique Tavares (Friend)  [phone] (Mobile)     Enriqueta Espino MD  PGY-3 Neurology

## 2025-04-20 ENCOUNTER — APPOINTMENT (OUTPATIENT)
Dept: CARDIOLOGY | Facility: HOSPITAL | Age: 20
DRG: 883 | End: 2025-04-20
Payer: COMMERCIAL

## 2025-04-20 ENCOUNTER — APPOINTMENT (OUTPATIENT)
Dept: RADIOLOGY | Facility: HOSPITAL | Age: 20
DRG: 883 | End: 2025-04-20
Payer: COMMERCIAL

## 2025-04-20 VITALS
HEIGHT: 68 IN | RESPIRATION RATE: 19 BRPM | OXYGEN SATURATION: 99 % | TEMPERATURE: 97.5 F | HEART RATE: 92 BPM | DIASTOLIC BLOOD PRESSURE: 85 MMHG | SYSTOLIC BLOOD PRESSURE: 120 MMHG | WEIGHT: 192.9 LBS | BODY MASS INDEX: 29.24 KG/M2

## 2025-04-20 LAB
ALBUMIN SERPL BCP-MCNC: 3.8 G/DL (ref 3.4–5)
ALBUMIN SERPL BCP-MCNC: 3.8 G/DL (ref 3.4–5)
ANION GAP SERPL CALC-SCNC: 13 MMOL/L (ref 10–20)
ANION GAP SERPL CALC-SCNC: 13 MMOL/L (ref 10–20)
BUN SERPL-MCNC: 7 MG/DL (ref 6–23)
BUN SERPL-MCNC: 7 MG/DL (ref 6–23)
CALCIUM SERPL-MCNC: 8.5 MG/DL (ref 8.6–10.6)
CALCIUM SERPL-MCNC: 8.5 MG/DL (ref 8.6–10.6)
CHLORIDE SERPL-SCNC: 107 MMOL/L (ref 98–107)
CHLORIDE SERPL-SCNC: 107 MMOL/L (ref 98–107)
CO2 SERPL-SCNC: 25 MMOL/L (ref 21–32)
CO2 SERPL-SCNC: 25 MMOL/L (ref 21–32)
CREAT SERPL-MCNC: 0.58 MG/DL (ref 0.5–1.3)
CREAT SERPL-MCNC: 0.58 MG/DL (ref 0.5–1.3)
EGFRCR SERPLBLD CKD-EPI 2021: >90 ML/MIN/1.73M*2
EGFRCR SERPLBLD CKD-EPI 2021: >90 ML/MIN/1.73M*2
ERYTHROCYTE [DISTWIDTH] IN BLOOD BY AUTOMATED COUNT: 14.6 % (ref 11.5–14.5)
ERYTHROCYTE [DISTWIDTH] IN BLOOD BY AUTOMATED COUNT: 14.6 % (ref 11.5–14.5)
GLUCOSE SERPL-MCNC: 85 MG/DL (ref 74–99)
GLUCOSE SERPL-MCNC: 85 MG/DL (ref 74–99)
HCT VFR BLD AUTO: 30.6 % (ref 36–52)
HCT VFR BLD AUTO: 30.6 % (ref 36–52)
HGB BLD-MCNC: 9.5 G/DL (ref 12–17.5)
HGB BLD-MCNC: 9.5 G/DL (ref 12–17.5)
MAGNESIUM SERPL-MCNC: 1.72 MG/DL (ref 1.6–2.4)
MAGNESIUM SERPL-MCNC: 1.72 MG/DL (ref 1.6–2.4)
MCH RBC QN AUTO: 23.9 PG (ref 26–34)
MCH RBC QN AUTO: 23.9 PG (ref 26–34)
MCHC RBC AUTO-ENTMCNC: 31 G/DL (ref 32–36)
MCHC RBC AUTO-ENTMCNC: 31 G/DL (ref 32–36)
MCV RBC AUTO: 77 FL (ref 80–100)
MCV RBC AUTO: 77 FL (ref 80–100)
NRBC BLD-RTO: 0 /100 WBCS (ref 0–0)
NRBC BLD-RTO: 0 /100 WBCS (ref 0–0)
PHOSPHATE SERPL-MCNC: 3.7 MG/DL (ref 2.5–4.9)
PHOSPHATE SERPL-MCNC: 3.7 MG/DL (ref 2.5–4.9)
PLATELET # BLD AUTO: 194 X10*3/UL (ref 150–450)
PLATELET # BLD AUTO: 194 X10*3/UL (ref 150–450)
POTASSIUM SERPL-SCNC: 3.5 MMOL/L (ref 3.5–5.3)
POTASSIUM SERPL-SCNC: 3.5 MMOL/L (ref 3.5–5.3)
RBC # BLD AUTO: 3.97 X10*6/UL (ref 4–5.9)
RBC # BLD AUTO: 3.97 X10*6/UL (ref 4–5.9)
SODIUM SERPL-SCNC: 141 MMOL/L (ref 136–145)
SODIUM SERPL-SCNC: 141 MMOL/L (ref 136–145)
WBC # BLD AUTO: 5.6 X10*3/UL (ref 4.4–11.3)
WBC # BLD AUTO: 5.6 X10*3/UL (ref 4.4–11.3)

## 2025-04-20 PROCEDURE — 36415 COLL VENOUS BLD VENIPUNCTURE: CPT

## 2025-04-20 PROCEDURE — 95715 VEEG EA 12-26HR INTMT MNTR: CPT

## 2025-04-20 PROCEDURE — 2500000002 HC RX 250 W HCPCS SELF ADMINISTERED DRUGS (ALT 637 FOR MEDICARE OP, ALT 636 FOR OP/ED)

## 2025-04-20 PROCEDURE — 93005 ELECTROCARDIOGRAM TRACING: CPT

## 2025-04-20 PROCEDURE — 1100000001 HC PRIVATE ROOM DAILY

## 2025-04-20 PROCEDURE — 71045 X-RAY EXAM CHEST 1 VIEW: CPT | Performed by: RADIOLOGY

## 2025-04-20 PROCEDURE — 85027 COMPLETE CBC AUTOMATED: CPT

## 2025-04-20 PROCEDURE — 2500000004 HC RX 250 GENERAL PHARMACY W/ HCPCS (ALT 636 FOR OP/ED): Mod: JZ

## 2025-04-20 PROCEDURE — 80069 RENAL FUNCTION PANEL: CPT

## 2025-04-20 PROCEDURE — 93010 ELECTROCARDIOGRAM REPORT: CPT | Performed by: INTERNAL MEDICINE

## 2025-04-20 PROCEDURE — 2500000001 HC RX 250 WO HCPCS SELF ADMINISTERED DRUGS (ALT 637 FOR MEDICARE OP)

## 2025-04-20 PROCEDURE — 71045 X-RAY EXAM CHEST 1 VIEW: CPT

## 2025-04-20 PROCEDURE — 83735 ASSAY OF MAGNESIUM: CPT

## 2025-04-20 PROCEDURE — 95720 EEG PHY/QHP EA INCR W/VEEG: CPT | Performed by: STUDENT IN AN ORGANIZED HEALTH CARE EDUCATION/TRAINING PROGRAM

## 2025-04-20 PROCEDURE — 99232 SBSQ HOSP IP/OBS MODERATE 35: CPT | Performed by: PSYCHIATRY & NEUROLOGY

## 2025-04-20 RX ORDER — SERTRALINE HYDROCHLORIDE 100 MG/1
100 TABLET, FILM COATED ORAL DAILY
Status: DISCONTINUED | OUTPATIENT
Start: 2025-04-21 | End: 2025-04-21 | Stop reason: HOSPADM

## 2025-04-20 RX ORDER — SODIUM CHLORIDE FOR INHALATION 3 %
3 VIAL, NEBULIZER (ML) INHALATION 3 TIMES DAILY
Status: DISCONTINUED | OUTPATIENT
Start: 2025-04-20 | End: 2025-04-21

## 2025-04-20 RX ADMIN — SERTRALINE 50 MG: 50 TABLET, FILM COATED ORAL at 09:01

## 2025-04-20 RX ADMIN — DEXTROAMPHETAMINE SACCHARATE, AMPHETAMINE ASPARTATE, DEXTROAMPHETAMINE SULFATE AND AMPHETAMINE SULFATE 15 MG: 2.5; 2.5; 2.5; 2.5 TABLET ORAL at 18:18

## 2025-04-20 RX ADMIN — FERROUS SULFATE TAB 325 MG (65 MG ELEMENTAL FE) 325 MG: 325 (65 FE) TAB at 09:01

## 2025-04-20 RX ADMIN — ENOXAPARIN SODIUM 40 MG: 100 INJECTION SUBCUTANEOUS at 22:00

## 2025-04-20 RX ADMIN — IBUPROFEN 800 MG: 400 TABLET ORAL at 11:42

## 2025-04-20 RX ADMIN — DEXTROAMPHETAMINE SACCHARATE, AMPHETAMINE ASPARTATE, DEXTROAMPHETAMINE SULFATE AND AMPHETAMINE SULFATE 15 MG: 2.5; 2.5; 2.5; 2.5 TABLET ORAL at 14:46

## 2025-04-20 RX ADMIN — FERROUS SULFATE TAB 325 MG (65 MG ELEMENTAL FE) 325 MG: 325 (65 FE) TAB at 21:59

## 2025-04-20 RX ADMIN — ALBUTEROL SULFATE 4 PUFF: 90 AEROSOL, METERED RESPIRATORY (INHALATION) at 22:00

## 2025-04-20 RX ADMIN — MODAFINIL 200 MG: 100 TABLET ORAL at 18:22

## 2025-04-20 RX ADMIN — BUPROPION HYDROCHLORIDE 450 MG: 150 TABLET, EXTENDED RELEASE ORAL at 09:01

## 2025-04-20 RX ADMIN — GABAPENTIN 300 MG: 300 CAPSULE ORAL at 22:01

## 2025-04-20 RX ADMIN — LACOSAMIDE 100 MG: 100 TABLET, FILM COATED ORAL at 09:01

## 2025-04-20 RX ADMIN — ACETAMINOPHEN 650 MG: 325 TABLET, FILM COATED ORAL at 22:00

## 2025-04-20 RX ADMIN — LACOSAMIDE 100 MG: 100 TABLET, FILM COATED ORAL at 22:00

## 2025-04-20 RX ADMIN — MODAFINIL 200 MG: 100 TABLET ORAL at 09:01

## 2025-04-20 ASSESSMENT — COGNITIVE AND FUNCTIONAL STATUS - GENERAL
DAILY ACTIVITIY SCORE: 24
MOBILITY SCORE: 23
CLIMB 3 TO 5 STEPS WITH RAILING: A LITTLE

## 2025-04-20 ASSESSMENT — PAIN SCALES - GENERAL
PAINLEVEL_OUTOF10: 4
PAINLEVEL_OUTOF10: 0 - NO PAIN
PAINLEVEL_OUTOF10: 4
PAINLEVEL_OUTOF10: 0 - NO PAIN
PAINLEVEL_OUTOF10: 0 - NO PAIN

## 2025-04-20 ASSESSMENT — PAIN - FUNCTIONAL ASSESSMENT
PAIN_FUNCTIONAL_ASSESSMENT: 0-10
PAIN_FUNCTIONAL_ASSESSMENT: 0-10
PAIN_FUNCTIONAL_ASSESSMENT: CPOT (CRITICAL CARE PAIN OBSERVATION TOOL)
PAIN_FUNCTIONAL_ASSESSMENT: 0-10

## 2025-04-20 ASSESSMENT — PAIN DESCRIPTION - DESCRIPTORS: DESCRIPTORS: CRAMPING

## 2025-04-20 NOTE — PROGRESS NOTES
"Lisa Ramirez is a 20 y.o. adult on day 3 of admission presenting with Seizure (Multi).      Subjective   ~1400 on 4/19, patient had a LUE twitching event with eyes closed and impaired responsiveness. Patient had removed EEG leads forcibly ~1300 so unable to acquire EEG tracing, however maintained on video monitoring and reviewed event not consistent with an epileptic semiology, see EEG report for formal note.    Discussed at bedside with entire team today. Reviewed all results and CXR imaging.    Patient reported ongoing dyspnea, requested oxygen therapy despite pulse ox, they feel it does not accurately reflect their oxygen status. Reports being on home oxygen therapy, though unable to corroborate this in record. Also reported that RT \"said there maybe fluid or pneumonia\" after the intubation - requested repeat CXR and if any abnormalities requested pulm consultation.    Discussed continuing lacosamide at 100mg BID - patient was under the impression it was to be taken 200mg BID, which is incorrect, however patient strongly feels that 100 will be insufficient \"because I just had a breakthrough seizure yesterday.\" Patient reported they were going to take the lacosamide at the higher dose (\"200 twice a day\") because they have the pills and will do what is safe for them. We discussed that it is not safe to take medications outside of how they are prescribed, and that lacosamide has serious potential adverse reactions including abnormal heart rhythms. They expressed frustration that we were not starting at a high dose and then decreasing it to a tolerable level. We discussed that approach is unsafe for seizure medications because of the severity of the side effects. We also discussed the risk of rescue benzodiazepines, including intubation as occurred this time.    As part of shared decision making, will acquire noninvasive testing including CXR and EKG.    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 Did review prior pulmonology notes and CT chest (small pneumomediastium previously) - no prior explanation for subjective dyspnea, including extensive workup.     We discussed that while the feeling of shortness of breath is there, we have no objective evidence for any dangerous condition at this time. Tests have not found reason for concern or reason to pursue invasive workup, so we will not acquire an ABG despite patient's request. We did discuss that the recent intubation may take time to recover from, but that he will recover more quickly with activity. No objective signs of tachypnea or dyspnea during this conversation, patient with strong voice and able to speak consistently for extended periods.    We discussed pre-test probabilities and the risk of false positives on extensive workups, and we discussed that there is no indication for additional neurologic workup at this time, as MRI has been recently completed and EEG does not show any sign of a lesion or other concern. I reassured patient that the event at ~1400 on 4/19 of LUE movement was not consistent with an epileptic seizure, and so we have no reason to worry that the current dose of lacosamide is insufficient to treat any underlying epilepsy. Reassuring that the EEG monitoring has not demonstrated a very active epilepsy. Patient amenable to remaining on 100mg BID lacosamide.     In the end, we discussed that patient would benefit from returning home back to normal life as quickly as " "possible to start the recovery process. Patient reflected that he is sure he will continue to learn more about his disease and our approach to diagnosis, even if he is somewhat unsatisfied with the explanation at this time. We briefly discussed PNES and how patients may have comorbid epilepsy and PNES, but also that the risks of invasive testing are not warranted at this time.    Patient will consider discharge tonight or tomorrow and inform team via nurse.    Objective     Last Recorded Vitals  Blood pressure 113/75, pulse 93, temperature 36 °C (96.8 °F), temperature source Temporal, resp. rate 18, height 1.727 m (5' 8\"), weight 87.5 kg (192 lb 14.4 oz), SpO2 99%.    Physical Exam  Neurological Exam  Briskly awake, interactive. Sitting upright in bed  Tracks with eyes throughout room.  Face symmetric.  Lying comfortably in bed, moving all extremities equally.     Moving all 4 extremities antigravity.    Remainder of exam deferred as per patient request.    Relevant Results     Scheduled medications  amphetamine-dextroamphetamine, 15 mg, oral, TID  [Held by provider] amphetamine-dextroamphetamine XR, 30 mg, oral, q24h  buPROPion XL, 450 mg, oral, Daily  enoxaparin, 40 mg, subcutaneous, q24h  ferrous sulfate, 325 mg, oral, BID  fluticasone furoate-vilanteroL, 1 puff, inhalation, Daily  gabapentin, 300 mg, oral, Daily  lacosamide, 100 mg, oral, BID  modafinil, 200 mg, oral, BID  [Held by provider] norethindrone, 1 tablet, oral, Daily  polyethylene glycol, 17 g, oral, Daily  [START ON 4/21/2025] sertraline, 100 mg, oral, Daily  sodium chloride, 3 mL, nebulization, TID      Continuous medications       PRN medications  PRN medications: acetaminophen **OR** acetaminophen, albuterol, albuterol, benzonatate, ibuprofen    Results for orders placed or performed during the hospital encounter of 04/17/25 (from the past 24 hours)   CBC   Result Value Ref Range    WBC 5.6 4.4 - 11.3 x10*3/uL    nRBC 0.0 0.0 - 0.0 /100 WBCs    " RBC 3.97 (L) 4.00 - 5.90 x10*6/uL    Hemoglobin 9.5 (L) 12.0 - 17.5 g/dL    Hematocrit 30.6 (L) 36.0 - 52.0 %    MCV 77 (L) 80 - 100 fL    MCH 23.9 (L) 26.0 - 34.0 pg    MCHC 31.0 (L) 32.0 - 36.0 g/dL    RDW 14.6 (H) 11.5 - 14.5 %    Platelets 194 150 - 450 x10*3/uL   Magnesium   Result Value Ref Range    Magnesium 1.72 1.60 - 2.40 mg/dL   Renal Function Panel   Result Value Ref Range    Glucose 85 74 - 99 mg/dL    Sodium 141 136 - 145 mmol/L    Potassium 3.5 3.5 - 5.3 mmol/L    Chloride 107 98 - 107 mmol/L    Bicarbonate 25 21 - 32 mmol/L    Anion Gap 13 10 - 20 mmol/L    Urea Nitrogen 7 6 - 23 mg/dL    Creatinine 0.58 0.50 - 1.30 mg/dL    eGFR >90 >60 mL/min/1.73m*2    Calcium 8.5 (L) 8.6 - 10.6 mg/dL    Phosphorus 3.7 2.5 - 4.9 mg/dL    Albumin 3.8 3.4 - 5.0 g/dL     EEG  Result Date: 4/20/2025  IMPRESSION Impression This cvEEG is normal. No epileptiform discharges or lateralizing signs are recorded. While patient is off the EEG one simple motor event with left arm jerking is recorded on video ( 3:30 pm at 04/19/2025). The semiology on video is consistent with a non- epileptic event. A full report will be scanned into the patient's chart at a later time. 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 He was witnessed to have seizure-like activity on 4/16 PM which stopped with home rescue med, but had additional seizure-like activity on 4/17 AM which lasted for 15min, was brought to ER, intubated for airway protection. Friend describes event as LUE stiffening, followed by full body stiffening then shaking all over, with L head turn and L face  twitching. There was foaming at the mouth and post-ictal coma. No B/B incontinence or tongue biting. Admitted to NSU for cvEEG and further monitoring. Pt extubated without issues. cvEEG negative for epileptiform activity to this point. Pt requesting transfer to [facility] however no further medical needs other than discharge, so [facility] declined transfer. Patient with complaints of subjective SOB, CXR normal. Plan to stop Keppra and start Vimpat 100 BID, then follow up outpatient.    Updates 4/20:  - patient requested replacement of oxygen due to subjective dyspnea, pulse ox persistently at normal levels  - Continue Vimpat 100mg BID - we discussed not to take higher than prescribed, and to followup medication over time.  - As part of shared decision making, will acquire noninvasive testing including CXR and EKG.    #Paroxysmal events, c/f seizure  #Idiopathic hypersomnia vs narcolepsy  ::Events have not to this date been proven to be epileptic  ::EEG without epileptiform activity  :: one video recording 4/19 after patient removed leads is not consistent with epileptic semiology  ::Lamictal level undetectable  = Off Keppra  = Vimpat 100mg BID  -DC home Lamictal (undetectable level)  -cvEEG while patient is amenable and inpatient. Can discontinue 4/20 if patient amenable to discharge  -Restarted home Adderall  -Holding modafinil    #MDD  #LETICIA  -C/w home sertraline 100mg daily (patient reports taking at this dose)    F: PRN  E: PRN  N: regular diet  A: PIV  DVT: Lovenox  PPI: PRN  Code Status: Full Code  NOK: Shanique Tavares (Friend)  [phone] (Mobile)     Ezekiel Glover MD  PGY-4 Neurology    Patient examined and discussed with attending Dr. Acuna who agrees with the above.

## 2025-04-20 NOTE — SIGNIFICANT EVENT
NEUROLOGY UPDATE  Patient requested to speak with provider regarding supplemental oxygen. Patient also requested RT, which RN ordered. By the time provider arrived at bedside, RT had already placed patient on supplemental oxygen for comfort. Provider discussed at length with patient and friend at bedside that there was no medical indication for supplemental oxygen as vitals have remained stable. However, given description of patient's agitation without supplemental oxygen, there is concern that patient's sleep would be disrupted should supplemental oxygen be discontinued. When asked about homegoing on oxygen, patient insisted that he has supplemental oxygen at home which he uses regularly. Agreed to allow patient to remain on supplemental oxygen to promote sleep/prevent delirium. Strongly recommend discussion with patient in AM regarding discontinuing oxygen.    Meagan Foster  PGY-4 Neurology

## 2025-04-20 NOTE — CARE PLAN
The patient's goals for the shift include      The clinical goals for the shift include Pt will remain seizure free overnight    Problem: Pain - Adult  Goal: Verbalizes/displays adequate comfort level or baseline comfort level  Outcome: Progressing     Problem: Safety - Adult  Goal: Free from fall injury  Outcome: Progressing     Problem: Discharge Planning  Goal: Discharge to home or other facility with appropriate resources  Outcome: Progressing     Problem: Chronic Conditions and Co-morbidities  Goal: Patient's chronic conditions and co-morbidity symptoms are monitored and maintained or improved  Outcome: Progressing     Problem: Nutrition  Goal: Nutrient intake appropriate for maintaining nutritional needs  Outcome: Progressing     Problem: Fall/Injury  Goal: Use assistive devices by end of the shift  Outcome: Progressing     Problem: Fall/Injury  Goal: Verbalize understanding of personal risk factors for fall in the hospital  Outcome: Progressing     Problem: Pain  Goal: Turns in bed with improved pain control throughout the shift  Outcome: Progressing     Problem: Pain  Goal: Performs ADL's with improved pain control throughout shift  Outcome: Progressing     Problem: Respiratory  Goal: Minimal/no exertional discomfort or dyspnea this shift  Outcome: Progressing     Problem: Respiratory  Goal: Minimize anxiety/maximize coping throughout shift  Outcome: Progressing     Problem: Respiratory  Goal: Patent airway maintained this shift  Outcome: Progressing     Problem: Skin  Goal: Promote skin healing  Outcome: Progressing

## 2025-04-21 ENCOUNTER — PHARMACY VISIT (OUTPATIENT)
Dept: PHARMACY | Facility: CLINIC | Age: 20
End: 2025-04-21
Payer: COMMERCIAL

## 2025-04-21 VITALS
SYSTOLIC BLOOD PRESSURE: 116 MMHG | WEIGHT: 192.9 LBS | RESPIRATION RATE: 16 BRPM | BODY MASS INDEX: 29.24 KG/M2 | TEMPERATURE: 97.9 F | OXYGEN SATURATION: 98 % | DIASTOLIC BLOOD PRESSURE: 73 MMHG | HEIGHT: 68 IN | HEART RATE: 108 BPM

## 2025-04-21 PROBLEM — R56.9 NONEPILEPTIC EPISODE (MULTI): Chronic | Status: ACTIVE | Noted: 2025-04-21

## 2025-04-21 LAB
ALBUMIN SERPL BCP-MCNC: 3.9 G/DL (ref 3.4–5)
ANION GAP SERPL CALC-SCNC: 15 MMOL/L (ref 10–20)
ATRIAL RATE: 76 BPM
BUN SERPL-MCNC: 5 MG/DL (ref 6–23)
CALCIUM SERPL-MCNC: 8.8 MG/DL (ref 8.6–10.6)
CHLORIDE SERPL-SCNC: 106 MMOL/L (ref 98–107)
CO2 SERPL-SCNC: 24 MMOL/L (ref 21–32)
CREAT SERPL-MCNC: 0.64 MG/DL (ref 0.5–1.3)
EGFRCR SERPLBLD CKD-EPI 2021: >90 ML/MIN/1.73M*2
ERYTHROCYTE [DISTWIDTH] IN BLOOD BY AUTOMATED COUNT: 14.7 % (ref 11.5–14.5)
GLUCOSE SERPL-MCNC: 88 MG/DL (ref 74–99)
HCT VFR BLD AUTO: 31.7 % (ref 36–52)
HGB BLD-MCNC: 9.8 G/DL (ref 12–17.5)
MAGNESIUM SERPL-MCNC: 1.76 MG/DL (ref 1.6–2.4)
MCH RBC QN AUTO: 23.7 PG (ref 26–34)
MCHC RBC AUTO-ENTMCNC: 30.9 G/DL (ref 32–36)
MCV RBC AUTO: 77 FL (ref 80–100)
NRBC BLD-RTO: 0.4 /100 WBCS (ref 0–0)
P AXIS: 65 DEGREES
P OFFSET: 197 MS
P ONSET: 143 MS
PHOSPHATE SERPL-MCNC: 4.5 MG/DL (ref 2.5–4.9)
PLATELET # BLD AUTO: 206 X10*3/UL (ref 150–450)
POTASSIUM SERPL-SCNC: 3.7 MMOL/L (ref 3.5–5.3)
PR INTERVAL: 146 MS
Q ONSET: 216 MS
QRS COUNT: 12 BEATS
QRS DURATION: 84 MS
QT INTERVAL: 408 MS
QTC CALCULATION(BAZETT): 459 MS
QTC FREDERICIA: 441 MS
R AXIS: 64 DEGREES
RBC # BLD AUTO: 4.14 X10*6/UL (ref 4–5.9)
SODIUM SERPL-SCNC: 141 MMOL/L (ref 136–145)
T AXIS: 55 DEGREES
T OFFSET: 420 MS
VENTRICULAR RATE: 76 BPM
WBC # BLD AUTO: 4.7 X10*3/UL (ref 4.4–11.3)

## 2025-04-21 PROCEDURE — RXMED WILLOW AMBULATORY MEDICATION CHARGE

## 2025-04-21 PROCEDURE — 95718 EEG PHYS/QHP 2-12 HR W/VEEG: CPT | Performed by: STUDENT IN AN ORGANIZED HEALTH CARE EDUCATION/TRAINING PROGRAM

## 2025-04-21 PROCEDURE — 99239 HOSP IP/OBS DSCHRG MGMT >30: CPT

## 2025-04-21 PROCEDURE — 97165 OT EVAL LOW COMPLEX 30 MIN: CPT | Mod: GO

## 2025-04-21 PROCEDURE — 80069 RENAL FUNCTION PANEL: CPT

## 2025-04-21 PROCEDURE — 83735 ASSAY OF MAGNESIUM: CPT

## 2025-04-21 PROCEDURE — 2500000001 HC RX 250 WO HCPCS SELF ADMINISTERED DRUGS (ALT 637 FOR MEDICARE OP)

## 2025-04-21 PROCEDURE — 2500000004 HC RX 250 GENERAL PHARMACY W/ HCPCS (ALT 636 FOR OP/ED)

## 2025-04-21 PROCEDURE — 95712 VEEG 2-12 HR INTMT MNTR: CPT

## 2025-04-21 PROCEDURE — 36415 COLL VENOUS BLD VENIPUNCTURE: CPT

## 2025-04-21 PROCEDURE — 85027 COMPLETE CBC AUTOMATED: CPT

## 2025-04-21 PROCEDURE — 2500000002 HC RX 250 W HCPCS SELF ADMINISTERED DRUGS (ALT 637 FOR MEDICARE OP, ALT 636 FOR OP/ED): Performed by: STUDENT IN AN ORGANIZED HEALTH CARE EDUCATION/TRAINING PROGRAM

## 2025-04-21 RX ORDER — SERTRALINE HYDROCHLORIDE 100 MG/1
100 TABLET, FILM COATED ORAL DAILY
Qty: 30 TABLET | Refills: 1 | Status: SHIPPED | OUTPATIENT
Start: 2025-04-22 | End: 2025-06-21

## 2025-04-21 RX ORDER — SODIUM CHLORIDE FOR INHALATION 3 %
3 VIAL, NEBULIZER (ML) INHALATION 3 TIMES DAILY PRN
Status: DISCONTINUED | OUTPATIENT
Start: 2025-04-21 | End: 2025-04-21 | Stop reason: HOSPADM

## 2025-04-21 RX ADMIN — DEXTROAMPHETAMINE SACCHARATE, AMPHETAMINE ASPARTATE, DEXTROAMPHETAMINE SULFATE AND AMPHETAMINE SULFATE 15 MG: 2.5; 2.5; 2.5; 2.5 TABLET ORAL at 09:35

## 2025-04-21 RX ADMIN — ACETAMINOPHEN 650 MG: 325 TABLET, FILM COATED ORAL at 09:34

## 2025-04-21 RX ADMIN — POLYETHYLENE GLYCOL 3350 17 G: 17 POWDER, FOR SOLUTION ORAL at 09:34

## 2025-04-21 RX ADMIN — MODAFINIL 200 MG: 100 TABLET ORAL at 17:47

## 2025-04-21 RX ADMIN — FERROUS SULFATE TAB 325 MG (65 MG ELEMENTAL FE) 325 MG: 325 (65 FE) TAB at 09:34

## 2025-04-21 RX ADMIN — ALBUTEROL SULFATE 4 PUFF: 90 AEROSOL, METERED RESPIRATORY (INHALATION) at 16:20

## 2025-04-21 RX ADMIN — SERTRALINE 100 MG: 100 TABLET, FILM COATED ORAL at 09:35

## 2025-04-21 RX ADMIN — LACOSAMIDE 100 MG: 100 TABLET, FILM COATED ORAL at 09:34

## 2025-04-21 RX ADMIN — MODAFINIL 200 MG: 100 TABLET ORAL at 09:34

## 2025-04-21 RX ADMIN — DEXTROAMPHETAMINE SACCHARATE, AMPHETAMINE ASPARTATE, DEXTROAMPHETAMINE SULFATE AND AMPHETAMINE SULFATE 15 MG: 2.5; 2.5; 2.5; 2.5 TABLET ORAL at 17:47

## 2025-04-21 RX ADMIN — DEXTROAMPHETAMINE SACCHARATE, AMPHETAMINE ASPARTATE, DEXTROAMPHETAMINE SULFATE AND AMPHETAMINE SULFATE 15 MG: 2.5; 2.5; 2.5; 2.5 TABLET ORAL at 12:51

## 2025-04-21 RX ADMIN — BUPROPION HYDROCHLORIDE 450 MG: 150 TABLET, EXTENDED RELEASE ORAL at 09:34

## 2025-04-21 ASSESSMENT — ACTIVITIES OF DAILY LIVING (ADL)
BATHING_ASSISTANCE: INDEPENDENT
ADL_ASSISTANCE: INDEPENDENT

## 2025-04-21 ASSESSMENT — COGNITIVE AND FUNCTIONAL STATUS - GENERAL: DAILY ACTIVITIY SCORE: 24

## 2025-04-21 ASSESSMENT — PAIN SCALES - GENERAL
PAINLEVEL_OUTOF10: 5 - MODERATE PAIN
PAINLEVEL_OUTOF10: 0 - NO PAIN
PAINLEVEL_OUTOF10: 5 - MODERATE PAIN
PAINLEVEL_OUTOF10: 4

## 2025-04-21 ASSESSMENT — PAIN - FUNCTIONAL ASSESSMENT
PAIN_FUNCTIONAL_ASSESSMENT: 0-10

## 2025-04-21 ASSESSMENT — PAIN DESCRIPTION - LOCATION: LOCATION: ABDOMEN

## 2025-04-21 NOTE — DISCHARGE SUMMARY
Discharge Diagnosis  Seizure (Multi)    Epilepsy Quality and Core Measure Topics  The patient was encouraged to keep a seizure diary  First Time Seizures  No this is not the patient's first seizure  Is this patient seizure free?  No, the following changes will be made to reduce seizure frequency: Started vimpat 100 BID . No epileptic events but still having paroxysmal spells.  Should this patient observe standard seizure precautions?  Yes Reviewed seizure precautions with patient; specifically, the patient may not drive, may not operate heavy machinery, ought not swim unsupervised, should shower rather than bath, should be cautious with hot or heavy objects, and should not perform any activities at heights such as on a ladder. This will be re-assessed at the patient's next appointment.   Medication Side Effects Discussion Statement  The risks and benefits of pertinent medications were discussed with the patient and/or kin.    Issues Requiring Follow-Up  Follow up:  - Neurology: has follow up in resident clinic 4/23  - Pulmonology: has follow up at [facility] with her regular pulmonologist on 4/22    Test Results Pending At Discharge  Pending Labs       No current pending labs.            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     Pt reportedly had event 4/16 found bilateral stiffening, then convulsions with L head deviation, L face clonic activity, on/off for 5-10 minutes. 4/17 LUE extension/stiffening, then full body stiffening/shaking, foaming at mouth, eyes alternating between open/closed. EMS called. ER intubated d/t poor mentation, agitated requiring ketamine, fentanyl, propofol, Keppra  load 1.5g IV, admitted to the NSU for close monitoring.    While in the NSU, pt successfully extubated 4/18 AM. cvEEG showing no epileptiform activity. Lamotrigine level was undetectable, therefore not resumed. Pt was initially on Keppra 500mg BID, then transitioned to Lacosamide 100mg BID while admitted. Given no epileptiform activity and description of events, events are likely non-epileptic.    On 4/19, patient removed EEG electrodes approximately 1hr prior to an event of typical character, smaller than most, recorded on video monitor - this event involved arrhythmic, varied amplitude twitching of LUE with closed eyes, not rousable during event. No intervention, improved over time.     In summary, Spenser has a complex history of spells including semiologies concerning for both epileptic and nonepileptic events. No epileptic events were recorded despite prolonged EEG monitoring this admission, and patient was switched to 100mg lacosamide BID (had undetectable lamotrigine level on admission, LEV ineffective).     Nearing discharge, patient also developed subjective shortness of breath without desaturations noted on vitals. Labs and imaging were wnl however patient continued to request oxygen. As there were no objective findings of pulmonary disease on exam or labs, patient will have pulmonary follow up with their regular pulmonologist. Patient refused discharge multiple times due to this situation however primary team deemed that patient was medically ready for discharge on multiple occasions. Patient was discharged as patient has outpatient pulmonology and neurology appointments this week and does not meet any criteria for inpatient admission.    Of note, patient requesting refill of intranasal midazolam prior to discharge. This has been filled 6 times (with each fill containing 2 units) in past 4 months, which is concerning for overuse. Intranasal midazolam is not indicated for this patient given no hx of status  epilepticus, unclear history of epileptic seizures, known non-epileptic events (with use of this medication leading to respiratory depression requiring intubation), and psychiatric co-morbidities.     Pertinent Physical Exam At Time of Discharge  Physical Exam  Briskly awake, interactive. Sitting upright in bed  Tracks with eyes throughout room.  Face symmetric.  Lying comfortably in bed, moving all extremities equally.      Moving all 4 extremities antigravity.  Home Medications     Medication List      START taking these medications     lacosamide 100 mg tablet; Commonly known as: Vimpat; Take 1 tablet (100   mg) by mouth 2 times a day.     CHANGE how you take these medications     sertraline 100 mg tablet; Commonly known as: Zoloft; Take 1 tablet (100   mg) by mouth once daily.; Start taking on: April 22, 2025; What changed:   medication strength, how much to take, how to take this, when to take this     CONTINUE taking these medications     acetaminophen 500 mg tablet; Commonly known as: Tylenol   albuterol 90 mcg/actuation inhaler   * amphetamine-dextroamphetamine 15 mg tablet; Commonly known as:   Adderall   * amphetamine-dextroamphetamine XR 30 mg 24 hr capsule; Commonly known   as: Adderall XR   budesonide-formoterol 160-4.5 mcg/actuation inhaler; Commonly known as:   Symbicort; 1 PUFF INHALED ORALLY EVERY MORNING AND EVERY EVENING AS NEEDED   buPROPion  mg 24 hr tablet; Commonly known as: Wellbutrin XL; TAKE   3 TABLETS (450 MG) ORALLY DAILY IN THE MORNING   Combivent Respimat  mcg/actuation inhaler; Generic drug:   ipratropium-albuteroL; 1 PUFF INHALED ORALLY EVERY 4 HOURS AS NEEDED   ferrous sulfate 325 (65 Fe) mg EC tablet   fluticasone propion-salmeteroL 250-50 mcg/dose diskus inhaler; Commonly   known as: Advair Diskus   gabapentin 300 mg capsule; Commonly known as: Neurontin   ibuprofen 200 mg tablet   modafinil 200 mg tablet; Commonly known as: Provigil   norethindrone 0.35 mg tablet;  Commonly known as: Micronor; Take 1 tablet   (0.35 mg) by mouth once daily.  * This list has 2 medication(s) that are the same as other medications   prescribed for you. Read the directions carefully, and ask your doctor or   other care provider to review them with you.     STOP taking these medications     lamoTRIgine 100 mg tablet; Commonly known as: LaMICtal   levETIRAcetam 1,000 mg tablet; Commonly known as: Keppra   nasal spray midazolam 5 mg/spray (0.1 mL) spray,non-aerosol; Commonly   known as: Versed       Outpatient Follow-Up  No future appointments.    Enriqueta Espino MD

## 2025-04-21 NOTE — SIGNIFICANT EVENT
Evening Update:    Notified by nursing that patient is requesting intranasal midazolam Rx, prior to discharge. Patient admitted d/t shaking event c/f seizure. He was given intranasal midazolam by friends at home during event and then had poor mentation requiring intubation.     Discussed with epilepsy attending, Dr Padilla, who agrees that intranasal midazolam is not indicated for this patient. Patient has no history of status epilepticus, unclear history of epileptic seizures, known non-epileptic events, and psychiatric co-morbidities.    On pharmacy fill review, patient has filled intranasal midazolam (containing 2 units) 6 times in past 4 months, which is concerning for overuse of this medication.    Enriqueta Espino MD   PGY-3 Neurology  Epilepsy [phone]

## 2025-04-21 NOTE — PROGRESS NOTES
Occupational Therapy    Evaluation    Patient Name: Lisa Ramirez  MRN: [id number]  Today's Date: 4/21/2025  Room: 203/203  Time Calculation  Start Time: 0843  Stop Time: 0855  Time Calculation (min): 12 min    Assessment  IP OT Assessment  OT Assessment: Pt demos the ability to complete ADLs and functional transfers close to baseline at this time. The pt has appropriate support and home setup to enable safe discharge home. No acute OT need identified. Signing off.  Barriers to Discharge Home: No anticipated barriers  Evaluation/Treatment Tolerance: Patient tolerated treatment well  Medical Staff Made Aware: Yes  End of Session Communication: Bedside nurse  End of Session Patient Position: Bed, 3 rail up, Alarm off, not on at start of session, Alarm off, caregiver present  Plan:  Inpatient Plan  No Skilled OT: No acute OT goals identified  OT Frequency: OT eval only  OT Discharge Recommendations: No further acute OT, No OT needed after discharge  OT Recommended Transfer Status: Independent  OT - OK to Discharge: Yes  OT Assessment  Barriers to Discharge: None  Evaluation/Treatment Tolerance: Patient tolerated treatment well  Medical Staff Made Aware: Yes  Strengths: Capable of completing ADLs semi/independent, Attitude of self    Subjective   Current Problem:  1. 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, chronic pain and fibromyalgia (plans to start ketamine infusions at [facility]) and paroxysmal events c/f epilepsy vs PNES  Prior to Session Communication: Bedside nurse  Patient Position Received: Bed, 3 rail up, Alarm off, not on at start of session  Family/Caregiver Present: Yes  Caregiver Feedback: Significant other in bed with pt upon arrival  General Comment: Pt pleasant and agreeable to OT evaluation    Precautions:  Medical Precautions: Seizure precautions    Pain:  Pain Assessment  Pain Assessment: 0-10  0-10 (Numeric) Pain Score: 5 - Moderate pain  Pain Type: Acute pain  Pain Location: Chest  Pain Interventions: Repositioned  Response to Interventions: No change in pain  Objective   Cognition:  Overall Cognitive Status: Within Functional Limits  Orientation Level: Oriented X4  Cognition Comments: Scored a 29/30 on the MoCA indicating normal cognition - only error made was during delayed recall  Insight: Within function limits   Home Living:  Type of Home: Apartment  Lives With:  (Roommate)  Home Adaptive Equipment: Crutches  Home Layout: One level  Home Access: Elevator  Bathroom Shower/Tub: Tub/shower unit  Bathroom Toilet: Handicapped height  Bathroom Equipment: Grab bars in shower, Built-in shower seat, Grab bars around toilet   Prior Function:  Level of [place]: Independent with ADLs and functional transfers, Independent with homemaking with ambulation  ADL Assistance: Independent  Homemaking Assistance: Independent  Ambulatory Assistance: Independent  Vocational:  (Student)  Leisure: Making magazines  Hand Dominance: Right  Prior Function Comments: - falls  IADL History:  Homemaking Responsibilities: Yes  Meal Prep Responsibility: Primary  Laundry Responsibility: Primary  Cleaning Responsibility: Primary  Bill Paying/Finance Responsibility: Primary  Shopping Responsibility: Primary  Current License: Yes  Mode of Transportation: Car  Occupation: Student  Leisure and Hobbies: Making groSolars  ADL:  Eating Assistance: Independent (Anticipated)  Grooming Assistance: Independent (Anticipated)  Bathing Assistance: Independent (Anticipated)  UE Dressing Assistance: Independent (Anticipated)  LE Dressing Assistance: Independent (Anticipated)  Toileting Assistance with Device: Independent (Anticipated)  ADL Comments: Pt reports no concerns with ability to take care of themselves  Activity  Tolerance:  Endurance: Endurance does not limit participation in activity  Bed Mobility/Transfers: Bed Mobility  Bed Mobility: No (Pt reports no concerns - primary focus of eval on cognition - pt reports being up IND in room with no concerns)   and    IADL's:   Homemaking Responsibilities: Yes  Meal Prep Responsibility: Primary  Laundry Responsibility: Primary  Cleaning Responsibility: Primary  Bill Paying/Finance Responsibility: Primary  Shopping Responsibility: Primary  Current License: Yes  Mode of Transportation: Car  Occupation: Student  Leisure and Hobbies: Making [place]  Vision: Vision - Basic Assessment  Current Vision: Wears glasses all the time   and    Sensation:  Light Touch: No apparent deficits  Strength:  Strength Comments: B UEs appear WFL  Perception:  Inattention/Neglect: Appears intact  Coordination:  Movements are Fluid and Coordinated: Yes   Hand Function:  Hand Function  Gross Grasp: Functional  Coordination: Functional  Extremities: RUE   RUE : Within Functional Limits, LUE   LUE: Within Functional Limits,  , and      Outcome Measures: [facility] Daily Activity  Putting on and taking off regular lower body clothing: None  Bathing (including washing, rinsing, drying): None  Putting on and taking off regular upper body clothing: None  Toileting, which includes using toilet, bedpan or urinal: None  Taking care of personal grooming such as brushing teeth: None  Eating Meals: None  Daily Activity - Total Score: 24         , MoCA  Visuospatial/Executive: 5  Naming: 3  Memory (Score '0' as this is an Unscored Section): 0  Attention: Read List of Digits: 2  Attention: Read List of Letters: 1  Attention: Serial Sevens: 3  Language: Repeat: 2  Language: Fluency: 1  Abstraction: 2  Delayed Recall: 4  Orientation: 6  Add 1 Point if </=12 yr Education: 0  MOCA Total Score: 29   OT Adult Other Outcome Measures  4AT: 4 AT -  MOCA Total Score: 29    Education Documentation  Body Mechanics, taught by Chaim  GERALDO Hinkle at 4/21/2025 10:20 AM.  Learner: Patient  Readiness: Acceptance  Method: Explanation  Response: Verbalizes Understanding  Comment: ADLs and safety    ADL Training, taught by Chaim Hinkle OT at 4/21/2025 10:20 AM.  Learner: Patient  Readiness: Acceptance  Method: Explanation  Response: Verbalizes Understanding  Comment: ADLs and safety    Education Comments  No comments found.      04/21/25 at 10:20 AM   Chaim Hinkle OT   Rehab Office: [phone]

## 2025-04-21 NOTE — CARE PLAN
The patient's goals for the shift include  seeing a pulmonologist.    The clinical goals for the shift include Patient will not have any signs/symptoms of seizures throughout shift.      Problem: Pain - Adult  Goal: Verbalizes/displays adequate comfort level or baseline comfort level  Outcome: Progressing     Problem: Safety - Adult  Goal: Free from fall injury  Outcome: Progressing     Problem: Discharge Planning  Goal: Discharge to home or other facility with appropriate resources  Outcome: Progressing     Problem: Chronic Conditions and Co-morbidities  Goal: Patient's chronic conditions and co-morbidity symptoms are monitored and maintained or improved  Outcome: Progressing

## 2025-04-21 NOTE — CARE PLAN
Transitional Care Coordinator Note: Patient discussed with medical team, per medical team patient is medically ready. Discharge dispo: [facility]. ADOD 4/21  Jv Broussard RN  Transitional Care Coordinator

## 2025-04-22 PROBLEM — F68.10 MUNCHAUSEN SYNDROME: Status: ACTIVE | Noted: 2025-04-22

## 2025-04-22 NOTE — PROGRESS NOTES
"[facility] NEUROLOGY OUTPATIENT FOLLOW-UP NOTE  Subjective   Patient Overview  Lisa Ramirez \"Spenser\" is a 20 y.o. gender-queer patient (preferred pronouns \"he/they;\" assigned female sex at birth) with a history notable for asthma, fibromyalgia, depression, and paroxysmal events who presents to the neurology clinic for follow-up of excessive daytime sleepiness and paroxysmal events. The patient has carried the diagnosis of idiopathic hypersomnia and undifferentiated paroxysmal events.    Previous Neurologic History  Briefly, the patient was first seen in neurology clinic on 1/31/24. At that time, he endorsed both sleepiness (main reason for consult) and paroxysmal events. Regarding sleepiness, this was described as longstanding since starting college-level classes in high school and gradually progressed. Now manifesting as persistent daytime sleepiness with at least once-daily sleep attacks. Often has dreams initially with the LOC, even if they are awoken in several minutes by friends. No other stigmata of narcolepsy and endorsed poor sleep hygiene. Regarding paroxysmal events, patient endorsed episodes of foul smell followed by LOC and diffuse motor actions since childhood. Overall diagnoses were consistent with Insufficient Sleep Syndrome with REM Intrusions (DDx narcolepsy) and unspecified paroxysmal events.     Regarding workup, MSLT was performed showing 0 SOREMs and MSL 6.5 mins compatible with the diagnosis of idiopathic hypersomnia. Paroxysmal events were never captured despite several admissions between [facility] and [facility]; all interictal EEG monitoring has been benign. Most recently, I saw this patient for an admission from 4/17 - 4/21/25 here at [facility] (currently under a different  MRN: [id number]). Patient suffered a prolonged event at home witnessed by his partner and was brought to the [facility] ED, ultimately intubated out of concern for status epilepticus. He was admitted to the " Neurosciences Intensive Care Unit. I personally read, reviewed, and interpreted this patient's EEG from their stay. While intubated on a propofol gtt s/p benzodiazepine administration, the EEG was expectedly ABN 3 for 1) CS, Gen and 2) EF, Gen. After extubation, the remainder of the recording is Normal Awake & Asleep with 9Hz PDR. Inpatient course was complicated by severe perceptual dyspnea despite normal respiratory dynamics (while on vent) and saturations (both on and off the vent), several outbursts with splitting between members of staff, and requests for intranasal midazolam outside of the events with pharmacy fill history showing 12 units filled in the past 4 months raising concern for overuse. Patient was monitored on cvEEG from 4/18 to 4/21 (personally reviewed), which was unremarkable, though complicated by self-removal of leads on multiple occasions. Patient suffered a typical event on 4/19/2025 at 15:25, 3 hours after self-discontinuation of electrodes. Despite not being seen electrographically, the event was captured on video and analyzed for semiotics by myself and our epilepsy faculty. At the beginning, the patient was laying on his right side with eyes closed with LUE resting on the leg of his partner. Then developed abnormal movements when the bedside RN came in to check afternoon vitals. Specifically, there was slow writhing non-rhythmic contractions of the LUE (mostly at the elbow with additional wrist flexion & shoulder abduction), with asynchronous movements between the muscles (biceps flexion was out-of-sync with supraspinatus and FCR/FCU engagement). Patient was laying down with eyes closed and unresponsive, otherwise floppy as being moved by RN and his partner. Movements resolved after 3 minutes; patient remained unresponsive while RN was in the room. Patient is interactive with his partner and volitionally adjusting the blanket with his LUE at 15:35. During his stay, the patient was  "cross-titrated from LTG (bridged with LEV) to LCM 100mg BID monotherapy. Discharged on  with followup for pulmonology a day later (yesterday) and me today.    I have personally read and reviewed the patient's notes from  inpatient neurology,  neurocritical care, and [facility] inpatient neurology.      ANAMNESIS PER PATIENT  A day before, the patient will notice a little \"weird\" feeling. During the event, there will be a burning smell. Then loses consciousness and wakes up on the floor with all muscles hurting. Has had minor injuries. Usually has urinary incontinence and has once or twice awakened with blood in the mouth, but does not have stimata of tongue biting.     Events have never changed in character, frequency, or duration.     ANAMNESIS PER COLLATERAL  Per patient's partner who witnesses these events, they fluctuate in phenomenology but generally involve confusion or sleepiness followed by generalized shaking, then left head turn and left upper extremity jerking.     RELATED COMORBIDITIES  Other Spell Types             Staring Spells? - yes. Big problem in middle and high school.             Arm/Leg Jerks? - maybe occasionally?              Lost blocks of time? - no             Events during sleep? - none     Epilepsy Risk Factors             Term birth - Yes             History of  hypoxia - no             History of birth complications - no             History of abnormal development or intellectual disability - no             History of CNS infection (meningitis/encephalitis) - no              History of febrile seizures - no             History of moderate/severe head trauma - several mild concussions             History of stroke/ICH - no             Family history of seizures - no             History of drug / alcohol use - maybe occasional marijuana??             History of neurosurgical procedure - no     ADDITIONAL INFORMATION  Anti-Epileptic Drug (AED) History             " Compliant: yes             Current AEDs: LMT 100mg Qday; GBP 300mg QHS             Current medication side effects: none             AEDs previously used and reasons for discontinuation: none     Quality of Life:             Driving: none             Working: Case Student- [employer]             Living with: self - studio apartment.       Interval History  In the interim, the patient states there have been no events since discharge from the hospital. Tolerating LCM but worried it is causing some depressive symptoms. Denies SI/HI. Otherwise alright. Awaiting the MCAT in August.      ROS:  A relevant review-of-systems was obtained and negative unless noted above in the HPI.    Past Medical History  Past Medical History:   Diagnosis Date    Asthma     Exercise induced bronchospasm (HHS-HCC)     Asthma, exercise induced    Personal history of diseases of the blood and blood-forming organs and certain disorders involving the immune mechanism     History of iron deficiency anemia    Personal history of other mental and behavioral disorders     History of depression    Personal history of other mental and behavioral disorders     History of OCD (obsessive compulsive disorder)    Personal history of other specified conditions     History of pseudoseizure     Surgical History  No past surgical history on file.  Social History  Social History     Tobacco Use    Smoking status: Never    Smokeless tobacco: Never   Vaping Use    Vaping status: Never Used   Substance Use Topics    Alcohol use: Yes     Comment: social    Drug use: Never     Comment: just a little     Allergies  Patient has no known allergies.      =========  Medications    Current Outpatient Medications:     acetaminophen (Tylenol) 500 mg tablet, Take 2 tablets (1,000 mg) by mouth every 6 hours if needed for mild pain (1 - 3)., Disp: , Rfl:     albuterol 2.5 mg/0.5 mL nebulizer solution, USE ONE DOSE EVERY 4 HOURS AS NEEDED FOR SHORTNESS OF BREATH, Disp: 180 mL, Rfl:  1    albuterol 90 mcg/actuation inhaler, Inhale 2 puffs every 4 hours if needed., Disp: 8.5 g, Rfl: 3    amphetamine-dextroamphetamine (AdderalL) 15 mg tablet, Take 1 tablet (15 mg) by mouth 3 times a day., Disp: 90 tablet, Rfl: 0    amphetamine-dextroamphetamine XR (Adderall XR) 30 mg 24 hr capsule, Take 1 capsule (30 mg) by mouth once daily in the morning. Do not crush or chew., Disp: 30 capsule, Rfl: 0    budesonide-formoteroL (Symbicort) 160-4.5 mcg/actuation inhaler, Inhale twice a day., Disp: , Rfl:     budesonide-formoterol (Symbicort) 160-4.5 mcg/actuation inhaler, Inhale 1 puff as instructed two times a day., Disp: 10.2 g, Rfl: 1    budesonide-formoterol (Symbicort) 160-4.5 mcg/actuation inhaler, Inhale 1 puff as instructed two times a day., Disp: 10.2 g, Rfl: 11    buPROPion XL (Wellbutrin XL) 150 mg 24 hr tablet, Take 3 tablets (450 mg) by mouth once daily., Disp: , Rfl:     buPROPion XL (Wellbutrin XL) 150 mg 24 hr tablet, 3 TABLETS (450 MG) ORALLY DAILY IN THE MORNING, Disp: 90 tablet, Rfl: 1    fluticasone propion-salmeteroL (Advair Diskus) 250-50 mcg/dose diskus inhaler, Inhale 1 puff 2 times a day. Rinse mouth with water after use to reduce aftertaste and incidence of candidiasis. Do not swallow. (Patient taking differently: Inhale 1 puff 2 times a day as needed. Rinse mouth with water after use to reduce aftertaste and incidence of candidiasis. Do not swallow.), Disp: 60 each, Rfl: 11    gabapentin (Neurontin) 300 mg capsule, Take 1 capsule (300 mg) by mouth once daily at bedtime., Disp: 30 capsule, Rfl: 4    humidifiers misc, 1 Units once daily as needed (for shortness of breath, wheezing)., Disp: 1 each, Rfl: 0    ibuprofen 200 mg tablet, Take 4 tablets (800 mg) by mouth every 8 hours if needed for mild pain (1 - 3)., Disp: , Rfl:     ipratropium (Atrovent) 17 mcg/actuation inhaler, Inhale 1 puff once daily as needed for shortness of breath or wheezing., Disp: , Rfl:     lamoTRIgine (LaMICtal) 100  mg tablet, Take 1 tablet (100 mg) by mouth 2 times a day. Do not fill before February 14, 2025., Disp: 60 tablet, Rfl: 11    levETIRAcetam (Keppra) 1,000 mg tablet, Take 1 tablet (1,000 mg) by mouth 2 times a day., Disp: 60 tablet, Rfl: 3    modafinil (Provigil) 200 mg tablet, Take 1 tablet (200 mg) by mouth 2 times a day., Disp: 60 tablet, Rfl: 5    nasal spray midazolam (Nayzilam) 5 mg/spray (0.1 mL) spray,non-aerosol, Use 1 Spray in the nose as needed for seizures lasting longer than 3 minutes for up to 30 days. May repeat dose in alternate nostril after 10 minutes based on response and tolerability., Disp: 2 each, Rfl: 0    nasal spray midazolam (Versed) 5 mg/spray (0.1 mL) spray,non-aerosol, Administer 1 spray as needed for seizures > 3 minutes, Disp: 2 each, Rfl: 3    sertraline (Zoloft) 50 mg tablet, Take 1 tablet (50 mg) by mouth once daily., Disp: , Rfl:     sertraline (Zoloft) 50 mg tablet, 1 TABLET (50MG) ORALLY DAILY, Disp: 30 tablet, Rfl: 1     =========      Objective   Vital Signs  There were no vitals taken for this visit.    Physical Exam  GENERAL: sitting up in chair comfortably; well-appearing and in NAD. Appears stated age.  PSYCHIATRIC/MSE: conversational; full euthymic affect; logical thought process; cognition intact.  NEUROLOGICAL:     Cognitive Status: A&Ox4. Language expression, comprehension, and repetition intact. Remains focused during interview.     Cranial Nerves: EOM full-range with smooth pursuits and no gaze-evoked nystagmus; saccades accurate, conjugate, and rapid; face symmetric; tongue midline.     Motor: Normal bulk and tone. There are no adventitious movements noted. No spasticity.     Strength: deferred     Reflexes: deferred     Sensory: deferred     Coordination: deferred     Rapid Movements: deferred     Gait: ambulates with crutches but moves quickly with their assistance. Gait is stable with normal step and stride length.     Add'l Maneuvers: deferred    Recent/Relevant  Labs:  Hemoglobin   Date/Time Value Ref Range Status   01/22/2025 06:18 AM 11.1 (L) 12.0 - 17.5 g/dL Final     WBC   Date/Time Value Ref Range Status   01/22/2025 06:18 AM 6.9 4.4 - 11.3 x10*3/uL Final     Sodium   Date/Time Value Ref Range Status   01/22/2025 06:18  136 - 145 mmol/L Final     Potassium   Date/Time Value Ref Range Status   01/22/2025 06:18 AM 4.0 3.5 - 5.3 mmol/L Final     Chloride   Date/Time Value Ref Range Status   01/22/2025 06:18  (H) 98 - 107 mmol/L Final     Urea Nitrogen   Date/Time Value Ref Range Status   01/22/2025 06:18 AM 15 6 - 23 mg/dL Final     Creatinine   Date/Time Value Ref Range Status   01/22/2025 06:18 AM 0.77 0.50 - 1.30 mg/dL Final     Magnesium   Date/Time Value Ref Range Status   01/22/2025 06:18 AM 2.00 1.60 - 2.40 mg/dL Final     Phosphorus   Date/Time Value Ref Range Status   01/22/2025 06:18 AM 4.6 2.5 - 4.9 mg/dL Final     Comment:     The performance characteristics of phosphorus testing in heparinized plasma have been validated by the individual  laboratory site where testing is performed. Testing on heparinized plasma is not approved by the FDA; however, such approval is not necessary.     Calcium   Date/Time Value Ref Range Status   01/22/2025 06:18 AM 8.5 (L) 8.6 - 10.6 mg/dL Final     Total Protein   Date/Time Value Ref Range Status   01/20/2025 02:51 AM 7.1 6.4 - 8.2 g/dL Final     Albumin   Date/Time Value Ref Range Status   01/22/2025 06:18 AM 3.8 3.4 - 5.0 g/dL Final     Bilirubin, Total   Date/Time Value Ref Range Status   01/20/2025 02:51 AM 0.3 0.0 - 1.2 mg/dL Final     AST   Date/Time Value Ref Range Status   01/20/2025 02:51 AM 12 9 - 39 U/L Final     ALT   Date/Time Value Ref Range Status   01/20/2025 02:51 AM 9 7 - 52 U/L Final     Comment:     Patients treated with Sulfasalazine may generate falsely decreased results for ALT.     Alkaline Phosphatase   Date/Time Value Ref Range Status   01/20/2025 02:51 AM 64 33 - 120 U/L Final     Total  CK   Date/Time Value Ref Range Status   02/06/2023 05:23 PM 79 0 - 215 U/L Final      LDL   Date/Time Value Ref Range Status   09/01/2022 11:14 AM 49 0 - 109 mg/dL Final     Comment:     .                           NEAR      BORD      AGE      DESIRABLE  OPTIMAL    HIGH     HIGH     VERY HIGH     0-19 Y     0 - 109     ---    110-129   >/= 130     ----    20-24 Y     0 - 119     ---    120-159   >/= 160     ----      >24 Y     0 -  99   100-129  130-159   160-189     >/=190  .       Triglycerides   Date/Time Value Ref Range Status   09/01/2022 11:14 AM 67 0 - 149 mg/dL Final     Comment:     .      AGE      DESIRABLE   BORDERLINE HIGH   HIGH     VERY HIGH   0 D-90 D    19 - 174         ----         ----        ----  91 D- 9 Y     0 -  74        75 -  99     >/= 100      ----    10-19 Y     0 -  89        90 - 129     >/= 130      ----    20-24 Y     0 - 114       115 - 149     >/= 150      ----         >24 Y     0 - 149       150 - 199    200- 499    >/= 500  .   Venipuncture immediately after or during the    administration of Metamizole may lead to falsely   low results. Testing should be performed immediately   prior to Metamizole dosing.       Hemoglobin A1C   Date/Time Value Ref Range Status   02/06/2023 05:23 PM 5.7 (A) % Final     Comment:          Diagnosis of Diabetes-Adults   Non-Diabetic: < or = 5.6%   Increased risk for developing diabetes: 5.7-6.4%   Diagnostic of diabetes: > or = 6.5%  .       Monitoring of Diabetes                Age (y)     Therapeutic Goal (%)   Adults:          >18           <7.0   Pediatrics:    13-18           <7.5                   7-12           <8.0                   0- 6            7.5-8.5   American Diabetes Association. Diabetes Care 33(S1), Jan 2010.     09/01/2022 11:14 AM 5.8 (A) % Final     Comment:          Diagnosis of Diabetes-Adults   Non-Diabetic: < or = 5.6%   Increased risk for developing diabetes: 5.7-6.4%   Diagnostic of diabetes: > or = 6.5%  .        Monitoring of Diabetes                Age (y)     Therapeutic Goal (%)   Adults:          >18           <7.0   Pediatrics:    13-18           <7.5                   7-12           <8.0                   0- 6            7.5-8.5   American Diabetes Association. Diabetes Care 33(S1), Jan 2010.       Estimated Average Glucose   Date/Time Value Ref Range Status   02/06/2023 05:23  MG/DL Final     Troponin I, High Sensitivity ([facility])   Date/Time Value Ref Range Status   12/09/2024 10:23 PM <3 0 - 53 ng/L Final     TSH   Date/Time Value Ref Range Status   02/06/2023 05:23 PM 4.48 (H) 0.44 - 3.98 mIU/L Final     Comment:      TSH testing is performed using different testing    methodology at [facility] than at other    system hospitals. Direct result comparisons should    only be made within the same method.     09/01/2022 11:14 AM 1.85 0.44 - 3.98 mIU/L Final     Comment:      TSH testing is performed using different testing    methodology at [facility] than at other    [facility]. Direct result comparisons should    only be made within the same method.       Thyroid Stimulating Hormone   Date/Time Value Ref Range Status   10/14/2024 11:27 AM 2.57 0.44 - 3.98 mIU/L Final     Free T4   Date/Time Value Ref Range Status   02/06/2023 05:23 PM 1.34 0.78 - 1.48 ng/dL Final     Comment:      Thyroxine Free testing is performed using different testing    methodology at [facility] than at other    [facility]. Direct result comparisons should    only be made within the same method.       Vitamin D, 25-Hydroxy, Total   Date/Time Value Ref Range Status   10/14/2024 11:27 AM 25 (L) 30 - 100 ng/mL Final       Recent/Relevant Imaging:  MR brain wo IV contrast  Result Date: 1/17/2025  Interpreted By:  Radha Murdock, STUDY: MR BRAIN WO IV CONTRAST; 1/17/2025 9:47 am   INDICATION: Signs/Symptoms:Acute Seizure Protocol.   COMPARISON: None.   ACCESSION NUMBER(S): [id number]    ORDERING CLINICIAN: RACHEL ESPINOZA   TECHNIQUE: Multiplanar multisequence MR imaging of brain was performed without intravenous contrast administration.   FINDINGS: There is no diffusion restriction abnormality to suggest acute infarct.   There is asymmetric mildly increased T2 and FLAIR hyperintensity involving the left hippocampal region, particularly involving the body and tail with very faint diffusion bright signal. The T1-T2 measurements in this region based on finger printing are also elevated as compared to contralateral side. There is blurring of the left hippocampal architecture with mild swelling of the left hippocampal region as compared to right.   The ventricles, sulci and basal cisterns are within normal limits. There is no focal parenchymal abnormality.   Visualized paranasal sinuses and bilateral mastoids are clear.       Slightly thickened left hippocampal body and tail with elevated T2 and FLAIR hyperintensity. These may reflect sequela of recent seizure activity. The other differential considerations such as sequela of acute ischemia, encephalitis/cerebritis are considered less likely.   Signed by: Radha Murdock 1/17/2025 12:14 PM Dictation workstation:   [id number]     CT head wo IV contrast  Result Date: 2/7/2025  * * *Final Report* * * DATE OF EXAM: Feb 7 2025  1:24AM   [facility]   [id number]  -  CT BRAIN WO IVCON   / ACCESSION #  [id number] PROCEDURE REASON: Headache, sudden, severe      * * * * Physician Interpretation * * * *  EXAMINATION: CT BRAIN WO IVCON CLINICAL HISTORY: Headache TECHNIQUE:  Serial axial images without IV contrast were obtained from the vertex to the foramen magnum. MQ:  [id number] CT Radiation dose: Integrated Dose-Length Product (DLP) for this visit = 705  mGy*cm CT Dose Reduction Employed: No dose reduction techniques were required COMPARISON: None. RESULT: Localizer images: No additional findings. Post-operative change: None. Acute change: No evidence of an acute infarct or  other acute parenchymal process. Hemorrhage: No evidence of acute intracranial hemorrhage. ECASS hemorrhagic transformation score: Not Applicable Mass Lesion / Mass Effect: There is no evidence of an intracranial mass or extraaxial fluid collection. No significant mass effect. Chronic change: None apparent. Parenchyma: There is no significant volume loss. The brain parenchyma is otherwise within normal limits for age. Ventricles: The ventricles are within normal limits of size and configuration for age.   Paranasal sinuses and skull base: The visualized paranasal sinuses are grossly clear. The skull base and imaged soft tissues are unremarkable.    IMPRESSION: No evidence of acute intracranial abnormality. : CHRIS   Transcribe Date/Time: Feb 7 2025  1:38A Dictated by : ERICA MCKINNON,  This examination was interpreted and the report reviewed and electronically signed by: ROBBY STREETER MD on Feb 7 2025  1:57AM  EST       Other Recent/Relevant Studies:  No EMG results found for the past 12 months   EEG  Result Date: 1/18/2025  IMPRESSION This vEEG is normal. No epileptiform discharges or lateralizing signs are seen. A full report will be scanned into the patient's chart at a later time. This report has been interpreted and electronically signed by     No echocardiogram results found for the past 12 months  Encounter Date: 12/09/24   ECG 12 lead   Result Value    Ventricular Rate 90    Atrial Rate 90    CT Interval 132    QRS Duration 94    QT Interval 362    QTC Calculation(Bazett) 442    P Axis 66    R Axis 70    T Axis -7    QRS Count 15    Q Onset 214    P Onset 148    P Offset 200    T Offset 395    QTC Fredericia 414    Narrative    Normal sinus rhythm  Nonspecific T wave abnormality  Abnormal ECG  When compared with ECG of 09-DEC-2024 22:31,  No significant change was found  See ED provider note for full interpretation and clinical correlation  Confirmed by Duncan Gibson ([id number]) on 12/10/2024  "2:12:21 AM          =========  Assessment/Plan   Assessment:  Lisa Ramirez \"Spenser\" is a 20 y.o. gender-queer patient (preferred pronouns \"he/they;\" assigned female sex at birth) with a history notable for asthma, fibromyalgia, depression, and paroxysmal events who presents to the neurology clinic for followup of excessive daytime sleepiness and paroxysmal events. The patient has carried the diagnosis of idiopathic hypersomnia (now managed by sleep medicine) and paroxysmal events. Regarding the events, these have now been captured on continuous video (though EEG leads had been pulled off) and show slow waxing-waning writhing non-rhythmic asynchronous contractions of the LUE with unresponsiveness to voice or gentle touch. The semiology/phenomenology is inconsistent with an epileptic paroxysmal event. Extensive interictal EEG captured both at  and [facility] (personally reviewed the  data) has been unremarkable. Further workup with [facility]-protocol MR has revealed equivocal thickening of the hippocampal tail on the left as compared to right, which does not correctly lateralize. Additionally, the patient has endorsed a variety of other non-neurological complaints which, while stemming from organic etiologies, are disproportionate to the degree of injury. The overall clinical manifestation is consistent with a diagnosis of Somatic Symptom Disorder; there is no evidence for focal epilepsy at this time.     I had an extensive conversation with this patient about Somatic Symptom Disorder and the associated neuroanatomy and pathophysiology, noting that this is in-fact mechanistically distinct from PNES/Functional Neurological Disorder/Conversion Disorder (though related). In the former, symptoms are driven by an ungated response to an absolutely real organic noxious stimulus we can see ir feel, but it is then amplified by our brains. This gets to the point where an otherwise nondistressing symptom or physiologic process is " "perceived as disabling. An example or corollary of this would be PPPD, where a patient becomes hyper-aware of the subtle shifts we make while standing to maintain balance through proprioception, and an ungated response to this amplifies this feeling to the point we perceive ourselves as swaying frantically and spinning. 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 Finally, the patient also made the connection that perhaps this is why his gait difficulties responded so dramatically to only a few sessions of physical therapy. I explained that in this same way, we will pursue specialized cognitive-behavioral therapy to address these gating dysfunctions, which do have a favorable prognosis (especially in comparison to refractory epilepsy!). I further reminded the patient that medicine is an applied science - we work off the data we have and form the best hypothesis, with everything being reconsidered and reevaluated in the light of new information which arises. To keep on the lookout for new data which emerges, the patient can continue to follow with us in neurology or epileptology, as patients with nonepileptic events can develop epileptic events later down the line just like anyone else. Furthermore, somatic symptoms should still be taken seriously and worked-up, but we can be encouraged by a negative workup that the etiology may be a \"smaller\" organic cause that is then amplified by our brains. The patient was responsive and agreeable to this.    Regarding rescue medication, we discussed that nasal midazolam or other " abortive benzodiazepines are not indicated in this disorder. We are comfortable prescribing these for epileptic seizures because the risk of hypoxemic resiratory failure or loss of airway control from status epilepticus outweighs the risk of hypercapnic respiratory failure from GABAergic suppression of respiratory drive. Because nonepileptic events do not carry this same risk of respiratory failure, while benzodiazepines also stop them, it is unconscionable to prescribe them given the inherent risk of respiratory depression. Because of this, there is no indication for a rescue medicine at this time. When events occur, the patient should just be moved to the lowest possible location if safe (so as not to fall) and turned on his side; the events self-abort. Patient understands and would like assistance explaining this to his partner, which will be provided.    Plan for followup with functional psychology and the patient's primary psychiatrist - would recommend neuropsychiatric care (such as with Dr. Donita Perez here at ) if the patient is amenable.       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 There is no indication for long-term anti-seizure medication therapy at this time. I additionally explained that because we have no evidence for  epileptic paroxysmal events, we should not pursue additional EMU admission for presurgical workup (if seizures, this would meet criteria for medication-refractory epilepsy).  - refer to functional psychology team  - refer to [facility]  - happy to refer to neuropsychiatry if the patient wishes to establish, otherwise can be managed by his primary [facility] psychiatrist.  - continue follow with sleep medicine. Continue to employ sleep hygiene modifications as advised in clinic.      RTC in 6 months. Given this will be after my graduation, he will be established with another neurology resident or with the epileptology faculty.      The patient was seen and the case discussed with attending continuity clinic neurologist Dr. Sincere Kennedy, who agrees with my management plan. The plan was also formulated with input from the Epilepsy  Dr. Kelly Acuna and Epilepsy Fellowship Director Dr. Luis E Padilla. Medical decision making was HIGH complexity.    Maurice Aleman MD  Neurology Resident PGY-4  [facility]rological [facility] [employer]

## 2025-04-23 ENCOUNTER — OFFICE VISIT (OUTPATIENT)
Dept: NEUROLOGY | Facility: HOSPITAL | Age: 20
End: 2025-04-23
Payer: COMMERCIAL

## 2025-04-23 VITALS
SYSTOLIC BLOOD PRESSURE: 131 MMHG | HEART RATE: 101 BPM | RESPIRATION RATE: 19 BRPM | WEIGHT: 192.9 LBS | DIASTOLIC BLOOD PRESSURE: 82 MMHG | BODY MASS INDEX: 31 KG/M2 | HEIGHT: 66 IN | TEMPERATURE: 97.8 F

## 2025-04-23 DIAGNOSIS — R56.9 SEIZURE (MULTI): ICD-10-CM

## 2025-04-23 DIAGNOSIS — F45.1 SOMATIC SYMPTOM DISORDER: Primary | ICD-10-CM

## 2025-04-23 PROCEDURE — 99213 OFFICE O/P EST LOW 20 MIN: CPT | Mod: GC

## 2025-04-23 PROCEDURE — 99213 OFFICE O/P EST LOW 20 MIN: CPT

## 2025-04-23 PROCEDURE — 3008F BODY MASS INDEX DOCD: CPT

## 2025-04-23 ASSESSMENT — PAIN SCALES - GENERAL: PAINLEVEL_OUTOF10: 6

## 2025-04-23 NOTE — PATIENT INSTRUCTIONS
Kishore Dueñas,    Thank you for letting me take part in your care! You were seen in the neurology resident clinic today for paroxysmal events - these are consistent with Somatic Symptom Disorder. Ultimately, we planned to continue the lacosamide and start CBT.    You may consider seeing a psychologist with expertise in functional cognitive behavioral therapy:  [facility]:  Serafin Davis, PhD  Magdalena Orta PsyD    [facility]:  Janelle Cates, PhD ([phone])  Alicia Sanchez PsyD ([phone])    Return to clinic in 6 months with Epileptology    To make an appointment in my clinic or leave a message for me with the neurology office, please call [phone]. If you need to contact me directly, you can reach out via [employer] Message or call the hospital  at [phone] and ask them to page me at [phone].    Thank you,    Maurice Aleman MD (PGY-4)  [facility] Neurological [facility] [employer]

## 2025-04-24 DIAGNOSIS — G89.4 CHRONIC PAIN SYNDROME: ICD-10-CM

## 2025-04-24 DIAGNOSIS — R56.9 SEIZURE (MULTI): ICD-10-CM

## 2025-04-24 PROCEDURE — RXMED WILLOW AMBULATORY MEDICATION CHARGE

## 2025-04-24 RX ORDER — MIDAZOLAM 5 MG/.1ML
SPRAY NASAL
Qty: 2 EACH | Refills: 3 | Status: CANCELLED | OUTPATIENT
Start: 2025-04-24

## 2025-04-25 ENCOUNTER — PHARMACY VISIT (OUTPATIENT)
Dept: PHARMACY | Facility: CLINIC | Age: 20
End: 2025-04-25
Payer: COMMERCIAL

## 2025-04-25 LAB
ATRIAL RATE: 106 BPM
ATRIAL RATE: 78 BPM
P AXIS: 69 DEGREES
P AXIS: 76 DEGREES
P OFFSET: 199 MS
P OFFSET: 205 MS
P ONSET: 145 MS
P ONSET: 150 MS
PR INTERVAL: 130 MS
PR INTERVAL: 140 MS
Q ONSET: 215 MS
Q ONSET: 215 MS
QRS COUNT: 12 BEATS
QRS COUNT: 17 BEATS
QRS DURATION: 86 MS
QRS DURATION: 98 MS
QT INTERVAL: 340 MS
QT INTERVAL: 392 MS
QTC CALCULATION(BAZETT): 446 MS
QTC CALCULATION(BAZETT): 451 MS
QTC FREDERICIA: 410 MS
QTC FREDERICIA: 427 MS
R AXIS: 69 DEGREES
R AXIS: 77 DEGREES
T AXIS: 60 DEGREES
T AXIS: 83 DEGREES
T OFFSET: 385 MS
T OFFSET: 411 MS
VENTRICULAR RATE: 106 BPM
VENTRICULAR RATE: 78 BPM

## 2025-04-28 PROCEDURE — RXMED WILLOW AMBULATORY MEDICATION CHARGE

## 2025-04-30 PROCEDURE — RXMED WILLOW AMBULATORY MEDICATION CHARGE

## 2025-05-02 ENCOUNTER — PHARMACY VISIT (OUTPATIENT)
Dept: PHARMACY | Facility: CLINIC | Age: 20
End: 2025-05-02
Payer: COMMERCIAL

## 2025-05-03 PROCEDURE — RXMED WILLOW AMBULATORY MEDICATION CHARGE

## 2025-05-04 ENCOUNTER — PHARMACY VISIT (OUTPATIENT)
Dept: PHARMACY | Facility: CLINIC | Age: 20
End: 2025-05-04
Payer: COMMERCIAL

## 2025-05-08 ENCOUNTER — APPOINTMENT (OUTPATIENT)
Dept: PRIMARY CARE | Facility: CLINIC | Age: 20
End: 2025-05-08
Payer: COMMERCIAL

## 2025-05-08 ENCOUNTER — PHARMACY VISIT (OUTPATIENT)
Dept: PHARMACY | Facility: CLINIC | Age: 20
End: 2025-05-08
Payer: COMMERCIAL

## 2025-05-08 VITALS
WEIGHT: 175 LBS | HEART RATE: 102 BPM | BODY MASS INDEX: 28.25 KG/M2 | DIASTOLIC BLOOD PRESSURE: 86 MMHG | SYSTOLIC BLOOD PRESSURE: 130 MMHG

## 2025-05-08 DIAGNOSIS — K59.03 DRUG-INDUCED CONSTIPATION: ICD-10-CM

## 2025-05-08 DIAGNOSIS — D50.9 IRON DEFICIENCY ANEMIA, UNSPECIFIED IRON DEFICIENCY ANEMIA TYPE: ICD-10-CM

## 2025-05-08 DIAGNOSIS — R39.89 URINE DISCOLORATION: Primary | ICD-10-CM

## 2025-05-08 PROCEDURE — RXMED WILLOW AMBULATORY MEDICATION CHARGE

## 2025-05-08 PROCEDURE — 99214 OFFICE O/P EST MOD 30 MIN: CPT | Performed by: INTERNAL MEDICINE

## 2025-05-08 RX ORDER — AMOXICILLIN 250 MG
1 CAPSULE ORAL DAILY
Qty: 30 TABLET | Refills: 11 | Status: SHIPPED | OUTPATIENT
Start: 2025-05-08 | End: 2026-05-08

## 2025-05-08 ASSESSMENT — PATIENT HEALTH QUESTIONNAIRE - PHQ9
SUM OF ALL RESPONSES TO PHQ9 QUESTIONS 1 & 2: 0
1. LITTLE INTEREST OR PLEASURE IN DOING THINGS: NOT AT ALL
2. FEELING DOWN, DEPRESSED OR HOPELESS: NOT AT ALL

## 2025-05-08 ASSESSMENT — ENCOUNTER SYMPTOMS
DEPRESSION: 0
LOSS OF SENSATION IN FEET: 0
OCCASIONAL FEELINGS OF UNSTEADINESS: 0
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
## 2025-05-09 LAB
APPEARANCE UR: CLEAR
BACTERIA #/AREA URNS HPF: ABNORMAL /HPF
BACTERIA UR CULT: ABNORMAL
BILIRUB UR QL STRIP: NEGATIVE
COLOR UR: YELLOW
GLUCOSE UR QL STRIP: NEGATIVE
HGB UR QL STRIP: NEGATIVE
HYALINE CASTS #/AREA URNS LPF: ABNORMAL /LPF
KETONES UR QL STRIP: NEGATIVE
LEUKOCYTE ESTERASE UR QL STRIP: NEGATIVE
NITRITE UR QL STRIP: NEGATIVE
PH UR STRIP: 8.5 [PH] (ref 5–8)
PROT UR QL STRIP: NEGATIVE
RBC #/AREA URNS HPF: ABNORMAL /HPF
SERVICE CMNT-IMP: ABNORMAL
SP GR UR STRIP: 1.02 (ref 1–1.03)
SQUAMOUS #/AREA URNS HPF: ABNORMAL /HPF
WBC #/AREA URNS HPF: ABNORMAL /HPF

## 2025-05-10 LAB — BACTERIA UR CULT: ABNORMAL

## 2025-05-11 RX ORDER — GABAPENTIN 300 MG/1
300 CAPSULE ORAL NIGHTLY
Qty: 30 CAPSULE | Refills: 4 | Status: SHIPPED | OUTPATIENT
Start: 2025-05-11

## 2025-05-12 ENCOUNTER — PATIENT MESSAGE (OUTPATIENT)
Dept: SLEEP MEDICINE | Facility: HOSPITAL | Age: 20
End: 2025-05-12
Payer: COMMERCIAL

## 2025-05-12 DIAGNOSIS — G47.411 NARCOLEPSY WITH CATAPLEXY (HHS-HCC): ICD-10-CM

## 2025-05-12 DIAGNOSIS — N30.00 ACUTE CYSTITIS WITHOUT HEMATURIA: Primary | ICD-10-CM

## 2025-05-12 RX ORDER — CIPROFLOXACIN 500 MG/1
500 TABLET, FILM COATED ORAL 2 TIMES DAILY
Qty: 10 TABLET | Refills: 0 | Status: SHIPPED | OUTPATIENT
Start: 2025-05-12 | End: 2025-05-17

## 2025-05-13 NOTE — PATIENT COMMUNICATION
Pt requesting refill ADDERALL IR AND XR for [facility]    This RN checked [facility] and it is consist with prescribed medications. Patient has been filling Rx appropriately. Prescription request sent to provider LONNIE to review.    Next appt with sleep med provider 9/11/2025    UDS 4/2025

## 2025-05-14 RX ORDER — DEXTROAMPHETAMINE SACCHARATE, AMPHETAMINE ASPARTATE MONOHYDRATE, DEXTROAMPHETAMINE SULFATE AND AMPHETAMINE SULFATE 7.5; 7.5; 7.5; 7.5 MG/1; MG/1; MG/1; MG/1
30 CAPSULE, EXTENDED RELEASE ORAL EVERY MORNING
Qty: 30 CAPSULE | Refills: 0 | Status: SHIPPED | OUTPATIENT
Start: 2025-05-14 | End: 2025-06-13

## 2025-05-14 RX ORDER — DEXTROAMPHETAMINE SACCHARATE, AMPHETAMINE ASPARTATE, DEXTROAMPHETAMINE SULFATE AND AMPHETAMINE SULFATE 3.75; 3.75; 3.75; 3.75 MG/1; MG/1; MG/1; MG/1
15 TABLET ORAL 3 TIMES DAILY
Qty: 90 TABLET | Refills: 0 | Status: SHIPPED | OUTPATIENT
Start: 2025-05-14 | End: 2025-06-13

## 2025-07-25 ENCOUNTER — TELEPHONE (OUTPATIENT)
Dept: SLEEP MEDICINE | Facility: HOSPITAL | Age: 20
End: 2025-07-25
Payer: COMMERCIAL

## 2025-07-25 NOTE — TELEPHONE ENCOUNTER
Pt called regarding medication plan of care, he states that he's currently taking his sleep meds as prescribed but they are becoming less effective     Sometimes he does not take his third adderall IR tab. Takes his modafinil upon awakening and wears off right at 3pm for second dose.     Next appt with sleep med provider on 9/2025, on waitlist for earlier appointment

## 2025-08-05 ENCOUNTER — TELEPHONE (OUTPATIENT)
Dept: SLEEP MEDICINE | Facility: HOSPITAL | Age: 20
End: 2025-08-05
Payer: COMMERCIAL

## 2025-08-25 PROCEDURE — RXMED WILLOW AMBULATORY MEDICATION CHARGE

## 2025-08-26 PROCEDURE — RXMED WILLOW AMBULATORY MEDICATION CHARGE

## 2025-08-28 ENCOUNTER — PHARMACY VISIT (OUTPATIENT)
Dept: PHARMACY | Facility: CLINIC | Age: 20
End: 2025-08-28
Payer: COMMERCIAL

## 2025-08-28 ENCOUNTER — OFFICE VISIT (OUTPATIENT)
Dept: SLEEP MEDICINE | Facility: HOSPITAL | Age: 20
End: 2025-08-28
Payer: COMMERCIAL

## 2025-08-28 VITALS
WEIGHT: 190 LBS | HEART RATE: 111 BPM | BODY MASS INDEX: 30.67 KG/M2 | OXYGEN SATURATION: 98 % | DIASTOLIC BLOOD PRESSURE: 73 MMHG | SYSTOLIC BLOOD PRESSURE: 123 MMHG | TEMPERATURE: 96.4 F

## 2025-08-28 DIAGNOSIS — G47.10 HYPERSOMNIA: ICD-10-CM

## 2025-08-28 DIAGNOSIS — G47.11 IDIOPATHIC HYPERSOMNIA: Primary | ICD-10-CM

## 2025-08-28 DIAGNOSIS — F32.89 OTHER DEPRESSION: ICD-10-CM

## 2025-08-28 PROCEDURE — 99214 OFFICE O/P EST MOD 30 MIN: CPT | Mod: GC | Performed by: INTERNAL MEDICINE

## 2025-08-28 PROCEDURE — 99214 OFFICE O/P EST MOD 30 MIN: CPT | Performed by: INTERNAL MEDICINE

## 2025-08-28 PROCEDURE — 1036F TOBACCO NON-USER: CPT | Performed by: INTERNAL MEDICINE

## 2025-08-28 RX ORDER — LEVETIRACETAM 1000 MG/1
1 TABLET ORAL
COMMUNITY
Start: 2025-07-07 | End: 2025-08-29 | Stop reason: WASHOUT

## 2025-08-28 RX ORDER — LACOSAMIDE 200 MG/1
200 TABLET ORAL 2 TIMES DAILY
COMMUNITY

## 2025-08-28 RX ORDER — HALOPERIDOL 5 MG/1
5 TABLET ORAL EVERY 12 HOURS PRN
COMMUNITY
Start: 2025-08-21 | End: 2025-11-19

## 2025-08-28 RX ORDER — CEPHALEXIN 500 MG/1
1 CAPSULE ORAL
COMMUNITY
Start: 2025-07-30 | End: 2025-08-29 | Stop reason: WASHOUT

## 2025-08-28 RX ORDER — BUPROPION HYDROCHLORIDE 300 MG/1
300 TABLET ORAL DAILY
COMMUNITY

## 2025-08-28 RX ORDER — IBUPROFEN 200 MG
TABLET ORAL
COMMUNITY

## 2025-08-28 RX ORDER — SERTRALINE HYDROCHLORIDE 100 MG/1
2 TABLET, FILM COATED ORAL DAILY
COMMUNITY

## 2025-08-28 ASSESSMENT — PAIN SCALES - GENERAL: PAINLEVEL_OUTOF10: 0-NO PAIN

## 2025-08-29 RX ORDER — MODAFINIL 200 MG/1
200 TABLET ORAL 2 TIMES DAILY
Qty: 60 TABLET | Refills: 5 | Status: SHIPPED | OUTPATIENT
Start: 2025-08-29 | End: 2026-02-25

## 2025-08-29 RX ORDER — DEXTROAMPHETAMINE SACCHARATE, AMPHETAMINE ASPARTATE, DEXTROAMPHETAMINE SULFATE AND AMPHETAMINE SULFATE 3.75; 3.75; 3.75; 3.75 MG/1; MG/1; MG/1; MG/1
15 TABLET ORAL 3 TIMES DAILY
Qty: 90 TABLET | Refills: 0 | Status: SHIPPED | OUTPATIENT
Start: 2025-08-29

## 2025-08-29 RX ORDER — DEXTROAMPHETAMINE SACCHARATE, AMPHETAMINE ASPARTATE MONOHYDRATE, DEXTROAMPHETAMINE SULFATE AND AMPHETAMINE SULFATE 7.5; 7.5; 7.5; 7.5 MG/1; MG/1; MG/1; MG/1
30 CAPSULE, EXTENDED RELEASE ORAL DAILY
Qty: 30 CAPSULE | Refills: 0 | Status: SHIPPED | OUTPATIENT
Start: 2025-08-29

## 2025-09-11 ENCOUNTER — APPOINTMENT (OUTPATIENT)
Dept: SLEEP MEDICINE | Facility: HOSPITAL | Age: 20
End: 2025-09-11
Payer: COMMERCIAL

## 2025-09-11 ENCOUNTER — APPOINTMENT (OUTPATIENT)
Dept: PRIMARY CARE | Facility: CLINIC | Age: 20
End: 2025-09-11
Payer: COMMERCIAL